# Patient Record
Sex: MALE | Race: BLACK OR AFRICAN AMERICAN | Employment: FULL TIME | URBAN - METROPOLITAN AREA
[De-identification: names, ages, dates, MRNs, and addresses within clinical notes are randomized per-mention and may not be internally consistent; named-entity substitution may affect disease eponyms.]

---

## 2018-01-19 ENCOUNTER — OFFICE VISIT (OUTPATIENT)
Dept: FAMILY MEDICINE CLINIC | Age: 58
End: 2018-01-19

## 2018-01-19 ENCOUNTER — HOSPITAL ENCOUNTER (OUTPATIENT)
Dept: LAB | Age: 58
Discharge: HOME | End: 2018-01-19
Payer: COMMERCIAL

## 2018-01-19 VITALS
SYSTOLIC BLOOD PRESSURE: 138 MMHG | HEART RATE: 88 BPM | BODY MASS INDEX: 25.07 KG/M2 | DIASTOLIC BLOOD PRESSURE: 86 MMHG | WEIGHT: 156 LBS | RESPIRATION RATE: 15 BRPM | TEMPERATURE: 98.9 F | OXYGEN SATURATION: 98 % | HEIGHT: 66 IN

## 2018-01-19 DIAGNOSIS — Z11.59 ENCOUNTER FOR HEPATITIS C SCREENING TEST FOR LOW RISK PATIENT: ICD-10-CM

## 2018-01-19 DIAGNOSIS — Z12.5 PROSTATE CANCER SCREENING: ICD-10-CM

## 2018-01-19 DIAGNOSIS — Z00.00 PHYSICAL EXAM: ICD-10-CM

## 2018-01-19 DIAGNOSIS — M25.551 PAIN OF RIGHT HIP JOINT: ICD-10-CM

## 2018-01-19 DIAGNOSIS — Z00.00 PHYSICAL EXAM: Primary | ICD-10-CM

## 2018-01-19 LAB
ALBUMIN SERPL-MCNC: 3.8 G/DL (ref 3.4–5)
ALBUMIN/GLOB SERPL: 1 {RATIO} (ref 0.8–1.7)
ALP SERPL-CCNC: 71 U/L (ref 45–117)
ALT SERPL-CCNC: 43 U/L (ref 16–61)
ANION GAP SERPL CALC-SCNC: 9 MMOL/L (ref 3–18)
AST SERPL-CCNC: 20 U/L (ref 15–37)
BASOPHILS # BLD: 0.1 K/UL (ref 0–0.06)
BASOPHILS NFR BLD: 1 % (ref 0–2)
BILIRUB SERPL-MCNC: 0.4 MG/DL (ref 0.2–1)
BUN SERPL-MCNC: 18 MG/DL (ref 7–18)
BUN/CREAT SERPL: 21 (ref 12–20)
CALCIUM SERPL-MCNC: 9.2 MG/DL (ref 8.5–10.1)
CHLORIDE SERPL-SCNC: 103 MMOL/L (ref 100–108)
CHOLEST SERPL-MCNC: 141 MG/DL
CO2 SERPL-SCNC: 26 MMOL/L (ref 21–32)
CREAT SERPL-MCNC: 0.87 MG/DL (ref 0.6–1.3)
DIFFERENTIAL METHOD BLD: ABNORMAL
EOSINOPHIL # BLD: 0.1 K/UL (ref 0–0.4)
EOSINOPHIL NFR BLD: 1 % (ref 0–5)
ERYTHROCYTE [DISTWIDTH] IN BLOOD BY AUTOMATED COUNT: 15.2 % (ref 11.6–14.5)
GLOBULIN SER CALC-MCNC: 4 G/DL (ref 2–4)
GLUCOSE SERPL-MCNC: 110 MG/DL (ref 74–99)
HCT VFR BLD AUTO: 48.6 % (ref 36–48)
HDLC SERPL-MCNC: 53 MG/DL (ref 40–60)
HDLC SERPL: 2.7 {RATIO} (ref 0–5)
HGB BLD-MCNC: 15.3 G/DL (ref 13–16)
LDLC SERPL CALC-MCNC: 69.4 MG/DL (ref 0–100)
LIPID PROFILE,FLP: NORMAL
LYMPHOCYTES # BLD: 1.9 K/UL (ref 0.9–3.6)
LYMPHOCYTES NFR BLD: 29 % (ref 21–52)
MCH RBC QN AUTO: 24.1 PG (ref 24–34)
MCHC RBC AUTO-ENTMCNC: 31.5 G/DL (ref 31–37)
MCV RBC AUTO: 76.5 FL (ref 74–97)
MONOCYTES # BLD: 0.8 K/UL (ref 0.05–1.2)
MONOCYTES NFR BLD: 12 % (ref 3–10)
NEUTS SEG # BLD: 3.7 K/UL (ref 1.8–8)
NEUTS SEG NFR BLD: 57 % (ref 40–73)
PLATELET # BLD AUTO: 283 K/UL (ref 135–420)
PMV BLD AUTO: 10.9 FL (ref 9.2–11.8)
POTASSIUM SERPL-SCNC: 4.4 MMOL/L (ref 3.5–5.5)
PROT SERPL-MCNC: 7.8 G/DL (ref 6.4–8.2)
PSA SERPL-MCNC: 0.4 NG/ML (ref 0–4)
RBC # BLD AUTO: 6.35 M/UL (ref 4.7–5.5)
SODIUM SERPL-SCNC: 138 MMOL/L (ref 136–145)
TRIGL SERPL-MCNC: 93 MG/DL (ref ?–150)
VLDLC SERPL CALC-MCNC: 18.6 MG/DL
WBC # BLD AUTO: 6.5 K/UL (ref 4.6–13.2)

## 2018-01-19 PROCEDURE — 85025 COMPLETE CBC W/AUTO DIFF WBC: CPT | Performed by: FAMILY MEDICINE

## 2018-01-19 PROCEDURE — 80061 LIPID PANEL: CPT | Performed by: FAMILY MEDICINE

## 2018-01-19 PROCEDURE — 86803 HEPATITIS C AB TEST: CPT | Performed by: FAMILY MEDICINE

## 2018-01-19 PROCEDURE — 84153 ASSAY OF PSA TOTAL: CPT | Performed by: FAMILY MEDICINE

## 2018-01-19 PROCEDURE — 82306 VITAMIN D 25 HYDROXY: CPT | Performed by: FAMILY MEDICINE

## 2018-01-19 PROCEDURE — 80053 COMPREHEN METABOLIC PANEL: CPT | Performed by: FAMILY MEDICINE

## 2018-01-19 NOTE — PROGRESS NOTES
Gilda Smoker. is a 56 y.o. male  presents for physical.  He has some right sided hip pain intermittently. No chest pain or SOB. No Known Allergies    Patient Active Problem List   Diagnosis Code    Advanced directives, counseling/discussion Z71.89    Elevated blood pressure [id number]     Past Medical History:   Diagnosis Date    Hypertension      Social History     Social History    Marital status:      Spouse name: N/A    Number of children: N/A    Years of education: N/A     Social History Main Topics    Smoking status: Never Smoker    Smokeless tobacco: Never Used    Alcohol use 4.8 oz/week     8 Cans of beer per week      Comment: weekends    Drug use: No    Sexual activity: Yes     Other Topics Concern    Not on file     Social History Narrative     No family history on file. Review of Systems   Constitutional: Negative for chills and fever. Cardiovascular: Positive for palpitations. Negative for chest pain. Gastrointestinal: Negative for constipation, diarrhea, nausea and vomiting. Musculoskeletal: Positive for joint pain (right hip). Neurological: Negative. Vitals:    01/19/18 0919   BP: 138/86   Pulse: 88   Resp: 15   Temp: 98.9 °F (37.2 °C)   SpO2: 98%   Weight: 156 lb (70.8 kg)   Height: 5' 6\" (1.676 m)   PainSc:   0 - No pain       Physical Exam   Constitutional: He is oriented to person, place, and time and well-developed, well-nourished, and in no distress. HENT:   Right Ear: External ear normal.   Left Ear: External ear normal.   Nose: Nose normal.   Mouth/Throat: Oropharynx is clear and moist.   Eyes: Conjunctivae are normal. Pupils are equal, round, and reactive to light. Neck: Normal range of motion. Neck supple. Cardiovascular: Normal rate, regular rhythm and normal heart sounds. Pulmonary/Chest: Effort normal and breath sounds normal.   Abdominal: Soft. Bowel sounds are normal.   Musculoskeletal: Normal range of motion.    Neurological: He is alert and oriented to person, place, and time. Gait normal.   Skin: Skin is warm and dry. Psychiatric: Mood, memory, affect and judgment normal.   Nursing note and vitals reviewed. Assessment/Plan      ICD-10-CM ICD-9-CM    1. Physical exam Z00.00 V70.9 CBC WITH AUTOMATED DIFF      LIPID PANEL      VITAMIN D, 25 HYDROXY      METABOLIC PANEL, COMPREHENSIVE   2. Pain of right hip joint M25.551 719.45    3. Prostate cancer screening Z12.5 V76.44 PSA SCREENING (SCREENING)   4. Encounter for hepatitis C screening test for low risk patient Z11.59 V73.89 HEPATITIS C AB     I have discussed the diagnosis with the patient and the intended plan of care as seen in the above orders. The patient has received an after-visit summary and questions were answered concerning future plans. I have discussed medication, side effects, and warnings with the patient in detail. The patient verbalized understanding and is in agreement with the plan of care. The patient will contact the office with any additional concerns. Follow-up Disposition:  Return in about 6 months (around 7/19/2018).   lab results and schedule of future lab studies reviewed with patient    Jameson Saldana MD

## 2018-01-19 NOTE — PATIENT INSTRUCTIONS

## 2018-01-19 NOTE — MR AVS SNAPSHOT
[facility] [address] [facility] 
[phone] Patient: Giovanna Chung. MRN: [id number] :1960 Visit Information Date & Time Provider Department Dept. Phone Encounter #  
 2018  9:15 AM Homa CaceresPriscila 33 [id number] Follow-up Instructions Return in about 6 months (around 2018). Upcoming Health Maintenance Date Due Hepatitis C Screening 1960 COLONOSCOPY 2021 DTaP/Tdap/Td series (2 - Td) 9/15/2025 Allergies as of 2018  Review Complete On: 2018 By: Homa Caceres MD  
 No Known Allergies Current Immunizations  Never Reviewed Name Date Influenza Vaccine (Quad) PF 2015 Tdap 9/15/2015 Not reviewed this visit You Were Diagnosed With   
  
 Codes Comments Physical exam    -  Primary ICD-10-CM: Z00.00 ICD-9-CM: V70.9 Pain of right hip joint     ICD-10-CM: M25.551 ICD-9-CM: 719.45 Prostate cancer screening     ICD-10-CM: Z12.5 ICD-9-CM: V76.44 Encounter for hepatitis C screening test for low risk patient     ICD-10-CM: Z11.59 
ICD-9-CM: V73.89 Vitals BP Pulse Temp Resp Height(growth percentile) Weight(growth percentile) 138/86 88 98.9 °F (37.2 °C) 15 5' 6\" (1.676 m) 156 lb (70.8 kg) SpO2 BMI Smoking Status 98% 25.18 kg/m2 Never Smoker Vitals History BMI and BSA Data Body Mass Index Body Surface Area  
 25.18 kg/m 2 1.82 m 2 Preferred Pharmacy Pharmacy Name Phone [id number], [address] [place] [address] Your Updated Medication List  
  
   
This list is accurate as of: 18  9:36 AM.  Always use your most recent med list. amLODIPine 5 mg tablet Commonly known as:  Xochitl Matos Take 1 Tab by mouth daily. Follow-up Instructions Return in about 6 months (around 7/19/2018). To-Do List   
 01/19/2018 Lab:  CBC WITH AUTOMATED DIFF   
  
 01/19/2018 Lab:  HEPATITIS C AB   
  
 01/19/2018 Lab:  LIPID PANEL   
  
 01/19/2018 Lab:  METABOLIC PANEL, COMPREHENSIVE   
  
 01/19/2018 Lab:  PSA SCREENING (SCREENING)   
  
 01/19/2018 Lab:  VITAMIN D, 25 HYDROXY Patient Instructions Well Visit, Men 48 to 72: Care Instructions Your Care Instructions Physical exams can help you stay healthy. Your doctor has checked your overall health and may have suggested ways to take good care of yourself. He or she also may have recommended tests. At home, you can help prevent illness with healthy eating, regular exercise, and other steps. Follow-up care is a key part of your treatment and safety. Be sure to make and go to all appointments, and call your doctor if you are having problems. It's also a good idea to know your test results and keep a list of the medicines you take. How can you care for yourself at home? · Reach and stay at a healthy weight. This will lower your risk for many problems, such as obesity, diabetes, heart disease, and high blood pressure. · Get at least 30 minutes of exercise on most days of the week. Walking is a good choice. You also may want to do other activities, such as running, swimming, cycling, or playing tennis or team sports. · Do not smoke. Smoking can make health problems worse. If you need help quitting, talk to your doctor about stop-smoking programs and medicines. These can increase your chances of quitting for good. · Protect your skin from too much sun. When you're outdoors from 10 a.m. to 4 p.m., stay in the shade or cover up with clothing and a hat with a wide brim. Wear sunglasses that block UV rays. Even when it's cloudy, put broad-spectrum sunscreen (SPF 30 or higher) on any exposed skin.  
· See a dentist one or two times a year for checkups and to have your teeth cleaned. · Wear a seat belt in the car. · Limit alcohol to 2 drinks a day. Too much alcohol can cause health problems. Follow your doctor's advice about when to have certain tests. These tests can spot problems early. · Cholesterol. Your doctor will tell you how often to have this done based on your overall health and other things that can increase your risk for heart attack and stroke. · Blood pressure. Have your blood pressure checked during a routine doctor visit. Your doctor will tell you how often to check your blood pressure based on your age, your blood pressure results, and other factors. · Prostate exam. Talk to your doctor about whether you should have a blood test (called a PSA test) for prostate cancer. Experts disagree on whether men should have this test. Some experts recommend that you discuss the benefits and risks of the test with your doctor. · Diabetes. Ask your doctor whether you should have tests for diabetes. · Vision. Some experts recommend that you have yearly exams for glaucoma and other age-related eye problems starting at age 48. · Hearing. Tell your doctor if you notice any change in your hearing. You can have tests to find out how well you hear. · Colon cancer. You should begin tests for colon cancer at age 48. You may have one of several tests. Your doctor will tell you how often to have tests based on your age and risk. Risks include whether you already had a precancerous polyp removed from your colon or whether your parent, brother, sister, or child has had colon cancer. · Heart attack and stroke risk. At least every 4 to 6 years, you should have your risk for heart attack and stroke assessed. Your doctor uses factors such as your age, blood pressure, cholesterol, and whether you smoke or have diabetes to show what your risk for a heart attack or stroke is over the next 10 years. · Abdominal aortic aneurysm.  Ask your doctor whether you should have a test to check for an aneurysm. You may need a test if you ever smoked or if your parent, brother, sister, or child has had an aneurysm. When should you call for help? Watch closely for changes in your health, and be sure to contact your doctor if you have any problems or symptoms that concern you. Where can you learn more? Go to http://ezequiel-bao.info/. Enter Y211 in the search box to learn more about \"Well Visit, Men 48 to 72: Care Instructions. \" Current as of: May 12, 2017 Content Version: 11.4 © 1468-9322 Huaat. Care instructions adapted under license by Leho (which disclaims liability or warranty for this information). If you have questions about a medical condition or this instruction, always ask your healthcare professional. [address] any warranty or liability for your use of this information. Introducing [facility] & HEALTH SERVICES! Arpan Morillo introduces [employer] patient portal. Now you can access parts of your medical record, email your doctor's office, and request medication refills online. 1. In your internet browser, go to [email or web address] 2. Click on the First Time User? Click Here link in the Sign In box. You will see the New Member Sign Up page. 3. Enter your [employer] Access Code exactly as it appears below. You will not need to use this code after youve completed the sign-up process. If you do not sign up before the expiration date, you must request a new code. · [employer] Access Code: [email or web address] Expires: 4/19/2018  9:36 AM 
 
4. Enter the last four digits of your Social Security Number (xxxx) and Date of Birth (mm/dd/yyyy) as indicated and click Submit. You will be taken to the next sign-up page. 5. Create a [employer] ID. This will be your [employer] login ID and cannot be changed, so think of one that is secure and easy to remember. 6. Create a [employer]ron[employer] password. You can change your password at any time. 7. Enter your Password Reset Question and Answer. This can be used at a later time if you forget your password. 8. Enter your e-mail address. You will receive e-mail notification when new information is available in [address] 9. Click Sign Up. You can now view and download portions of your medical record. 10. Click the Download Summary menu link to download a portable copy of your medical information. If you have questions, please visit the Frequently Asked Questions section of the [employer] website. Remember, [employer] is NOT to be used for urgent needs. For medical emergencies, dial 911. Now available from your iPhone and Android! Please provide this summary of care documentation to your next provider. Your primary care clinician is listed as [address] [facility]. If you have any questions after today's visit, please call [phone].

## 2018-01-19 NOTE — PROGRESS NOTES
Pt in office of CPE. He is not taking his BP meds.  States that 'sometimes he gets a pain in his right hip that runs down his leg'

## 2018-01-20 LAB — 25(OH)D3 SERPL-MCNC: 20.9 NG/ML (ref 30–100)

## 2018-01-22 LAB
HCV AB SER IA-ACNC: 0.05 INDEX
HCV AB SERPL QL IA: NEGATIVE
HCV COMMENT,HCGAC: NORMAL

## 2018-02-14 ENCOUNTER — TELEPHONE (OUTPATIENT)
Dept: FAMILY MEDICINE CLINIC | Age: 58
End: 2018-02-14

## 2018-02-19 ENCOUNTER — TELEPHONE (OUTPATIENT)
Dept: FAMILY MEDICINE CLINIC | Age: 58
End: 2018-02-19

## 2018-02-19 ENCOUNTER — OFFICE VISIT (OUTPATIENT)
Dept: FAMILY MEDICINE CLINIC | Age: 58
End: 2018-02-19

## 2018-02-19 VITALS
SYSTOLIC BLOOD PRESSURE: 144 MMHG | TEMPERATURE: 98.8 F | RESPIRATION RATE: 16 BRPM | WEIGHT: 156 LBS | OXYGEN SATURATION: 98 % | HEIGHT: 66 IN | DIASTOLIC BLOOD PRESSURE: 86 MMHG | HEART RATE: 67 BPM | BODY MASS INDEX: 25.07 KG/M2

## 2018-02-19 DIAGNOSIS — I10 ESSENTIAL HYPERTENSION: ICD-10-CM

## 2018-02-19 DIAGNOSIS — E55.9 VITAMIN D DEFICIENCY: Primary | ICD-10-CM

## 2018-02-19 RX ORDER — ERGOCALCIFEROL 1.25 MG/1
50000 CAPSULE ORAL
Qty: 12 CAP | Refills: 1 | Status: SHIPPED | OUTPATIENT
Start: 2018-02-19 | End: 2019-04-10 | Stop reason: SDUPTHER

## 2018-02-19 RX ORDER — AMLODIPINE BESYLATE 5 MG/1
5 TABLET ORAL DAILY
Qty: 30 TAB | Refills: 5 | Status: SHIPPED | OUTPATIENT
Start: 2018-02-19 | End: 2022-08-04 | Stop reason: SDUPTHER

## 2018-02-19 NOTE — PATIENT INSTRUCTIONS
Learning About Vitamin D  Why is it important to get enough vitamin D? Your body needs vitamin D to absorb calcium. Calcium keeps your bones and muscles, including your heart, healthy and strong. If your muscles don't get enough calcium, they can cramp, hurt, or feel weak. You may have long-term (chronic) muscle aches and pains. If you don't get enough vitamin D throughout life, you have an increased chance of having thin and brittle bones (osteoporosis) in your later years. Children who don't get enough vitamin D may not grow as much as others their age. They also have a chance of getting a rare disease called rickets. It causes weak bones. Vitamin D and calcium are added to many foods. And your body uses sunshine to make its own vitamin D. How much vitamin D do you need? The [place] of Medicine recommends that people ages 3 through 79 get 600 IU (international units) every day. Adults 71 and older need 800 IU every day. Blood tests for vitamin D can check your vitamin D level. But there is no standard normal range used by all laboratories. The [place] of Medicine recommends a blood level of 20 ng/mL of vitamin D for healthy bones. And most people in the [place] and [facility] ([facility]) meet this goal.  How can you get more vitamin D? Foods that contain vitamin D include:  · [place], tuna, and mackerel. These are some of the best foods to eat when you need to get more vitamin D.  · Cheese, egg yolks, and beef liver. These foods have vitamin D in small amounts. · Milk, soy drinks, orange juice, yogurt, margarine, and some kinds of cereal have vitamin D added to them. Some people don't make vitamin D as well as others. They may have to take extra care in getting enough vitamin D. Things that reduce how much vitamin D your body makes include:  · Dark skin, such as many  Americans have. · Age, especially if you are older than 72. · Digestive problems, such as Crohn's or celiac disease.   · Liver and kidney disease. Some people who do not get enough vitamin D may need supplements. Are there any risks from taking vitamin D?  · Too much vitamin D:  ¨ Can damage your kidneys. ¨ Can cause nausea and vomiting, constipation, and weakness. ¨ Raises the amount of calcium in your blood. If this happens, you can get confused or have an irregular heart rhythm. · Vitamin D may interact with other medicines. Tell your doctor about all of the medicines you take, including over-the-counter drugs, herbs, and pills. Tell your doctor about all of your current medical problems. Where can you learn more? Go to http://ezequielThe Kive Companybao.info/. Enter [id number] in the search box to learn more about \"Learning About Vitamin D.\"  Current as of: May 12, 2017  Content Version: 11.4  © 3439-5038 Healthwise, Incorporated. Care instructions adapted under license by Pixelpipe (which disclaims liability or warranty for this information). If you have questions about a medical condition or this instruction, always ask your healthcare professional. [address] any warranty or liability for your use of this information.

## 2018-02-19 NOTE — TELEPHONE ENCOUNTER
Pt states that Dr. Prem Benoit was going to send him something for his cold, but nothing was sent in. Please advise!

## 2018-02-19 NOTE — PROGRESS NOTES
Chief Complaint   Patient presents with    Mass     on legs.  Cough    Nasal Congestion     chest and nasal congestion. \"Can't cough it up\"    Vitamin D Deficiency     follow up on labs. Low Vit D.      1. Have you been to the ER, urgent care clinic since your last visit? Hospitalized since your last visit? No    2. Have you seen or consulted any other health care providers outside of the [address] [place] [address] since your last visit? Include any pap smears or colon screening.  No

## 2018-02-19 NOTE — MR AVS SNAPSHOT
[facility] [address] [place] [address] 
[phone] Patient: Laverne Callahan. MRN: [id number] :1960 Visit Information Date & Time Provider Department Dept. Phone Encounter #  
 2018  3:30 PM Jian Malik MD [facility] [phone] [id number] Follow-up Instructions Return in about 3 months (around 2018). Upcoming Health Maintenance Date Due COLONOSCOPY 2021 DTaP/Tdap/Td series (2 - Td) 9/15/2025 Allergies as of 2018  Review Complete On: 2018 By: Jian Malik MD  
 No Known Allergies Current Immunizations  Never Reviewed Name Date Influenza Vaccine (Quad) PF 2015 Tdap 9/15/2015 Not reviewed this visit You Were Diagnosed With   
  
 Codes Comments Vitamin D deficiency    -  Primary ICD-10-CM: E55.9 ICD-9-CM: 268.9 Essential hypertension     ICD-10-CM: I10 
ICD-9-CM: 401.9 Vitals BP Pulse Temp Resp Height(growth percentile) Weight(growth percentile) 144/86 67 98.8 °F (37.1 °C) 16 5' 6\" (1.676 m) 156 lb (70.8 kg) SpO2 BMI Smoking Status 98% 25.18 kg/m2 Never Smoker Vitals History BMI and BSA Data Body Mass Index Body Surface Area  
 25.18 kg/m 2 1.82 m 2 Preferred Pharmacy [facility] Phone [facility] [address] Your Updated Medication List  
  
   
This list is accurate as of: 18  3:52 PM.  Always use your most recent med list. amLODIPine 5 mg tablet Commonly known as:  Brenda Slate Take 1 Tab by mouth daily. ergocalciferol 50,000 unit capsule Commonly known as:  VITAMIN D2 Take 1 Cap by mouth every seven (7) days. Prescriptions Sent to Pharmacy Refills  
 ergocalciferol (VITAMIN D2) 50,000 unit capsule 1 Sig: Take 1 Cap by mouth every seven (7) days. Class: Normal  
 Pharmacy: [address] [place] [address], Jong Contreras Ph #: [phone] Route: Oral  
 amLODIPine (NORVASC) 5 mg tablet 5 Sig: Take 1 Tab by mouth daily. Class: Normal  
 Pharmacy: [address] [place], [place] [address]Jong Ph #: [phone] Route: Oral  
  
Follow-up Instructions Return in about 3 months (around 5/19/2018). Patient Instructions Learning About Vitamin D Why is it important to get enough vitamin D? Your body needs vitamin D to absorb calcium. Calcium keeps your bones and muscles, including your heart, healthy and strong. If your muscles don't get enough calcium, they can cramp, hurt, or feel weak. You may have long-term (chronic) muscle aches and pains. If you don't get enough vitamin D throughout life, you have an increased chance of having thin and brittle bones (osteoporosis) in your later years. Children who don't get enough vitamin D may not grow as much as others their age. They also have a chance of getting a rare disease called rickets. It causes weak bones. Vitamin D and calcium are added to many foods. And your body uses sunshine to make its own vitamin D. How much vitamin D do you need? The [place] of Medicine recommends that people ages 3 through 79 get 600 IU (international units) every day. Adults 71 and older need 800 IU every day. Blood tests for vitamin D can check your vitamin D level. But there is no standard normal range used by all laboratories. The [place] of Medicine recommends a blood level of 20 ng/mL of vitamin D for healthy bones. And most people in the [place] and [facility] ([facility]) meet this goal. 
How can you get more vitamin D? Foods that contain vitamin D include: 
· [place], tuna, and mackerel.  These are some of the best foods to eat when you need to get more vitamin D. 
 · Cheese, egg yolks, and beef liver. These foods have vitamin D in small amounts. · Milk, soy drinks, orange juice, yogurt, margarine, and some kinds of cereal have vitamin D added to them. Some people don't make vitamin D as well as others. They may have to take extra care in getting enough vitamin D. Things that reduce how much vitamin D your body makes include: · Dark skin, such as many  Americans have. · Age, especially if you are older than 72. · Digestive problems, such as Crohn's or celiac disease. · Liver and kidney disease. Some people who do not get enough vitamin D may need supplements. Are there any risks from taking vitamin D? 
· Too much vitamin D: 
¨ Can damage your kidneys. ¨ Can cause nausea and vomiting, constipation, and weakness. ¨ Raises the amount of calcium in your blood. If this happens, you can get confused or have an irregular heart rhythm. · Vitamin D may interact with other medicines. Tell your doctor about all of the medicines you take, including over-the-counter drugs, herbs, and pills. Tell your doctor about all of your current medical problems. Where can you learn more? Go to http://ezequiel-bao.info/. Enter [id number] in the search box to learn more about \"Learning About Vitamin D.\" 
Current as of: May 12, 2017 Content Version: 11.4 © 9598-7127 Healthwise, Incorporated. Care instructions adapted under license by marker.to (which disclaims liability or warranty for this information). If you have questions about a medical condition or this instruction, always ask your healthcare professional. [place] [address] any warranty or liability for your use of this information. Introducing [facility] & HEALTH SERVICES! [facility] introduces [employer] patient portal. Now you can access parts of your medical record, email your doctor's office, and request medication refills online.    
 
1. In your internet browser, go to [email or web address] 2. Click on the First Time User? Click Here link in the Sign In box. You will see the New Member Sign Up page. 3. Enter your [employer] Access Code exactly as it appears below. You will not need to use this code after youve completed the sign-up process. If you do not sign up before the expiration date, you must request a new code. · [employer] Access Code: [email or web address] Expires: 4/19/2018  9:36 AM 
 
4. Enter the last four digits of your Social Security Number (xxxx) and Date of Birth (mm/dd/yyyy) as indicated and click Submit. You will be taken to the next sign-up page. 5. Create a Zimridet ID. This will be your [employer] login ID and cannot be changed, so think of one that is secure and easy to remember. 6. Create a [employer] password. You can change your password at any time. 7. Enter your Password Reset Question and Answer. This can be used at a later time if you forget your password. 8. Enter your e-mail address. You will receive e-mail notification when new information is available in [address] 9. Click Sign Up. You can now view and download portions of your medical record. 10. Click the Download Summary menu link to download a portable copy of your medical information. If you have questions, please visit the Frequently Asked Questions section of the [employer] website. Remember, [employer] is NOT to be used for urgent needs. For medical emergencies, dial 911. Now available from your iPhone and Android! Please provide this summary of care documentation to your next provider. Your primary care clinician is listed as [address]. If you have any questions after today's visit, please call [phone].

## 2018-02-19 NOTE — LETTER
3/7/2018 10:57 AM 
 
Mr. Sonya Pang. [address] [facility] [address]  [facility], We have been unable to reach you at the number on file. At your convenience, please give our office a call. Our number is [phone]. Sincerely, Emely Tinajero MD

## 2018-02-19 NOTE — PROGRESS NOTES
Kenny Traore. is a 56 y.o. male  presents for follow up and refill. He has shiny spot on leg. Non tender. No Known Allergies  Outpatient Prescriptions Marked as Taking for the 2/19/18 encounter (Office Visit) with Abigail Camp MD   Medication Sig Dispense Refill    amLODIPine (NORVASC) 5 mg tablet Take 1 Tab by mouth daily. 30 Tab 2     Patient Active Problem List   Diagnosis Code    Advanced directives, counseling/discussion Z71.89    Elevated blood pressure [id number]    Pain of right hip joint M25.551     Past Medical History:   Diagnosis Date    Hypertension      Social History     Social History    Marital status:      Spouse name: N/A    Number of children: N/A    Years of education: N/A     Social History Main Topics    Smoking status: Never Smoker    Smokeless tobacco: Never Used    Alcohol use 4.8 oz/week     8 Cans of beer per week      Comment: weekends    Drug use: No    Sexual activity: Yes     Other Topics Concern    None     Social History Narrative     No family history on file. Review of Systems   Constitutional: Negative for chills and fever. Cardiovascular: Negative for chest pain and palpitations. Gastrointestinal: Negative for constipation, diarrhea, nausea and vomiting. Vitals:    02/19/18 1540   BP: 144/86   Pulse: 67   Resp: 16   Temp: 98.8 °F (37.1 °C)   SpO2: 98%   Weight: 156 lb (70.8 kg)   Height: 5' 6\" (1.676 m)   PainSc:   0 - No pain       Physical Exam   Constitutional: He is oriented to person, place, and time and well-developed, well-nourished, and in no distress. HENT:   Nose: Nose normal.   Mouth/Throat: Oropharynx is clear and moist.   Eyes: Conjunctivae are normal. Pupils are equal, round, and reactive to light. Neck: Normal range of motion. Neck supple. Cardiovascular: Normal rate, regular rhythm and normal heart sounds.     Pulmonary/Chest: Effort normal and breath sounds normal.   Neurological: He is oriented to person, place, and time. Gait normal.   Skin: Skin is warm and dry. Psychiatric: Mood, memory, affect and judgment normal.   Nursing note and vitals reviewed. Assessment/Plan      ICD-10-CM ICD-9-CM    1. Vitamin D deficiency E55.9 268.9 ergocalciferol (VITAMIN D2) 50,000 unit capsule   2. Essential hypertension I10 401.9 amLODIPine (NORVASC) 5 mg tablet     I have discussed the diagnosis with the patient and the intended plan of care as seen in the above orders. The patient has received an after-visit summary and questions were answered concerning future plans. I have discussed medication, side effects, and warnings with the patient in detail. The patient verbalized understanding and is in agreement with the plan of care. The patient will contact the office with any additional concerns. Follow-up Disposition:  Return in about 3 months (around 5/19/2018).   lab results and schedule of future lab studies reviewed with patient    Rosa M More MD

## 2019-02-11 ENCOUNTER — HOSPITAL ENCOUNTER (OUTPATIENT)
Dept: LAB | Age: 59
Discharge: HOME | End: 2019-02-11
Payer: COMMERCIAL

## 2019-02-11 ENCOUNTER — OFFICE VISIT (OUTPATIENT)
Dept: FAMILY MEDICINE CLINIC | Age: 59
End: 2019-02-11

## 2019-02-11 VITALS
DIASTOLIC BLOOD PRESSURE: 80 MMHG | HEIGHT: 66 IN | WEIGHT: 159.6 LBS | HEART RATE: 86 BPM | BODY MASS INDEX: 25.65 KG/M2 | SYSTOLIC BLOOD PRESSURE: 150 MMHG | RESPIRATION RATE: 14 BRPM | TEMPERATURE: 98.7 F | OXYGEN SATURATION: 94 %

## 2019-02-11 DIAGNOSIS — N20.0 KIDNEY STONE: Primary | ICD-10-CM

## 2019-02-11 PROCEDURE — 82360 CALCULUS ASSAY QUANT: CPT

## 2019-02-11 RX ORDER — TAMSULOSIN HYDROCHLORIDE 0.4 MG/1
CAPSULE ORAL
Refills: 0 | COMMUNITY
Start: 2019-01-26 | End: 2021-05-20

## 2019-02-11 RX ORDER — PROMETHAZINE HYDROCHLORIDE 25 MG/1
TABLET ORAL
Refills: 0 | COMMUNITY
Start: 2019-01-26 | End: 2021-05-20

## 2019-02-11 RX ORDER — OXYCODONE AND ACETAMINOPHEN 5; 325 MG/1; MG/1
TABLET ORAL
Refills: 0 | COMMUNITY
Start: 2019-01-26 | End: 2021-05-20

## 2019-02-11 NOTE — PROGRESS NOTES
Rikki Wilhelm is a 66 y.o. male  presents for follow up with kidney stone. No back pain fever or chills now. No Known Allergies  Outpatient Medications Marked as Taking for the 2/11/19 encounter (Office Visit) with Saloni Barron MD   Medication Sig Dispense Refill    oxyCODONE-acetaminophen (PERCOCET) 5-325 mg per tablet TAKE 1 TABLET BY MOUTH EVERY 6 HOURS AS NEEDED FOR MODERATE PAIN  0    tamsulosin (FLOMAX) 0.4 mg capsule TAKE 1 CAPSULE BY MOUTH AT BEDTIME FOR 7 DAYS  0     Patient Active Problem List   Diagnosis Code    Advanced directives, counseling/discussion Z71.89    Elevated blood pressure [id number]    Pain of right hip joint M25.551     Past Medical History:   Diagnosis Date    Hypertension      Social History     Socioeconomic History    Marital status:      Spouse name: Not on file    Number of children: Not on file    Years of education: Not on file    Highest education level: Not on file   Tobacco Use    Smoking status: Never Smoker    Smokeless tobacco: Never Used   Substance and Sexual Activity    Alcohol use: Yes     Alcohol/week: 4.8 oz     Types: 8 Cans of beer per week     Comment: weekends    Drug use: No    Sexual activity: Yes     History reviewed. No pertinent family history. Review of Systems   Constitutional: Negative for chills, fever, malaise/fatigue and weight loss. Eyes: Negative for blurred vision. Respiratory: Negative for shortness of breath and wheezing. Cardiovascular: Negative for chest pain. Gastrointestinal: Negative for nausea and vomiting. Genitourinary: Negative for dysuria, flank pain, frequency, hematuria and urgency. Musculoskeletal: Negative for back pain and myalgias. Skin: Negative for rash. Neurological: Negative for weakness.        Vitals:    02/11/19 0818   BP: 150/80   Pulse: 86   Resp: 14   Temp: 98.7 °F (37.1 °C)   TempSrc: Oral   SpO2: 94%   Weight: 159 lb 9.6 oz (72.4 kg)   Height: 5' 6\" (1.676 m)   PainSc:   0 - No pain       Physical Exam   Constitutional: He is oriented to person, place, and time and well-developed, well-nourished, and in no distress. Neck: Normal range of motion. Neck supple. No thyromegaly present. Cardiovascular: Normal rate, regular rhythm and normal heart sounds. Pulmonary/Chest: Effort normal and breath sounds normal.   Musculoskeletal: Normal range of motion. Neurological: He is alert and oriented to person, place, and time. Skin: Skin is warm and dry. Psychiatric: Mood, memory, affect and judgment normal.   Nursing note and vitals reviewed. Assessment/Plan      ICD-10-CM ICD-9-CM    1. Kidney stone N20.0 592.0 STONE ANALYSIS, URINARY     I have discussed the diagnosis with the patient and the intended plan of care as seen in the above orders. The patient has received an after-visit summary and questions were answered concerning future plans. I have discussed medication, side effects, and warnings with the patient in detail. The patient verbalized understanding and is in agreement with the plan of care. The patient will contact the office with any additional concerns. Keep appt with urology    Follow-up Disposition:  Return in about 3 weeks (around 3/4/2019).   lab results and schedule of future lab studies reviewed with patient    Park Castro MD

## 2019-02-11 NOTE — PROGRESS NOTES
Chief Complaint   Patient presents with    Kidney Stone     Seen at [facility] on 1/26/19 for kidney stones, was referred to Urology which he did see on 1/28/19. 1. Have you been to the ER, urgent care clinic since your last visit? Hospitalized since your last visit? Yes When: Feb 2, 2019 Where: [facility] Reason for visit: Kidney Stone  2. Have you seen or consulted any other health care providers outside of the [address] [place] [address] since your last visit? Include any pap smears or colon screening. Yes When: Jan 28, 2019 Where: [facility] Reason for visit: kidney stone    Medication reconciliation has been completed with patient. Care team discussed/updated as well as pharmacy. Health Maintenance reviewed - Flu vaccine not available, will discuss need for Shingrix with provider.

## 2019-02-14 LAB
COLOR STONE: NORMAL
COMMENT, 519994: NORMAL
COMPOSITION, 120440: NORMAL
DISCLAIMER, STO32L: NORMAL
NIDUS STONE QL: NORMAL
SIZE STONE: NORMAL MM
URATE MFR STONE: 100 %
WT STONE: 53.7 MG

## 2019-02-20 ENCOUNTER — TELEPHONE (OUTPATIENT)
Dept: FAMILY MEDICINE CLINIC | Age: 59
End: 2019-02-20

## 2019-02-20 NOTE — TELEPHONE ENCOUNTER
Pt came into the office. He had questions about his kidney stone. He knows it's a 'gout' stone, but he wants to know how it happened and how to avoid it. I offered pt appt to see dr Esperanza Francis to discuss this. He declined. He asked that I send a message.

## 2019-02-20 NOTE — PROGRESS NOTES
Pt given results. Pt advised to call office he has any issues or concerns or new/worsening sxs. Pt expressed clear understanding and had no further questions.

## 2019-02-26 NOTE — TELEPHONE ENCOUNTER
Emily Anderson MD sent to Gentry Alonso LPN   Caller: Unspecified (6 days ago,  4:33 PM)             Please send him a low purine diet ie gout diet to prevent stones. Pt will  info from . Left message for patient at home number requesting return call.

## 2019-04-04 ENCOUNTER — OFFICE VISIT (OUTPATIENT)
Dept: FAMILY MEDICINE CLINIC | Age: 59
End: 2019-04-04

## 2019-04-04 ENCOUNTER — TELEPHONE (OUTPATIENT)
Dept: FAMILY MEDICINE CLINIC | Age: 59
End: 2019-04-04

## 2019-04-04 ENCOUNTER — HOSPITAL ENCOUNTER (OUTPATIENT)
Dept: LAB | Age: 59
Discharge: HOME | End: 2019-04-04
Payer: COMMERCIAL

## 2019-04-04 VITALS
DIASTOLIC BLOOD PRESSURE: 80 MMHG | SYSTOLIC BLOOD PRESSURE: 132 MMHG | RESPIRATION RATE: 12 BRPM | HEART RATE: 86 BPM | OXYGEN SATURATION: 96 % | HEIGHT: 66 IN | TEMPERATURE: 97.9 F | WEIGHT: 162.4 LBS | BODY MASS INDEX: 26.1 KG/M2

## 2019-04-04 DIAGNOSIS — M25.511 CHRONIC RIGHT SHOULDER PAIN: Primary | ICD-10-CM

## 2019-04-04 DIAGNOSIS — R10.9 FLANK PAIN: ICD-10-CM

## 2019-04-04 DIAGNOSIS — E55.9 VITAMIN D DEFICIENCY: ICD-10-CM

## 2019-04-04 DIAGNOSIS — G89.29 CHRONIC RIGHT SHOULDER PAIN: Primary | ICD-10-CM

## 2019-04-04 LAB — URATE SERPL-MCNC: 5.9 MG/DL (ref 2.6–7.2)

## 2019-04-04 PROCEDURE — 82306 VITAMIN D 25 HYDROXY: CPT

## 2019-04-04 PROCEDURE — 84550 ASSAY OF BLOOD/URIC ACID: CPT

## 2019-04-04 NOTE — PATIENT INSTRUCTIONS
Shoulder Pain: Care Instructions  Your Care Instructions    You can hurt your shoulder by using it too much during an activity, such as fishing or baseball. It can also happen as part of the everyday wear and tear of getting older. Shoulder injuries can be slow to heal, but your shoulder should get better with time. Your doctor may recommend a sling to rest your shoulder. If you have injured your shoulder, you may need testing and treatment. Follow-up care is a key part of your treatment and safety. Be sure to make and go to all appointments, and call your doctor if you are having problems. It's also a good idea to know your test results and keep a list of the medicines you take. How can you care for yourself at home? · Take pain medicines exactly as directed. ? If the doctor gave you a prescription medicine for pain, take it as prescribed. ? If you are not taking a prescription pain medicine, ask your doctor if you can take an over-the-counter medicine. ? Do not take two or more pain medicines at the same time unless the doctor told you to. Many pain medicines contain acetaminophen, which is Tylenol. Too much acetaminophen (Tylenol) can be harmful. · If your doctor recommends that you wear a sling, use it as directed. Do not take it off before your doctor tells you to. · Put ice or a cold pack on the sore area for 10 to 20 minutes at a time. Put a thin cloth between the ice and your skin. · If there is no swelling, you can put moist heat, a heating pad, or a warm cloth on your shoulder. Some doctors suggest alternating between hot and cold. · Rest your shoulder for a few days. If your doctor recommends it, you can then begin gentle exercise of the shoulder, but do not lift anything heavy. When should you call for help? Call 911 anytime you think you may need emergency care. For example, call if:    · You have chest pain or pressure. This may occur with:  ? Sweating. ?  Shortness of breath. ? Nausea or vomiting. ? Pain that spreads from the chest to the neck, jaw, or one or both shoulders or arms. ? Dizziness or lightheadedness. ? A fast or uneven pulse. After calling 911, chew 1 adult-strength aspirin. Wait for an ambulance. Do not try to drive yourself.     · Your arm or hand is cool or pale or changes color.    Call your doctor now or seek immediate medical care if:    · You have signs of infection, such as:  ? Increased pain, swelling, warmth, or redness in your shoulder. ? Red streaks leading from a place on your shoulder. ? Pus draining from an area of your shoulder. ? Swollen lymph nodes in your neck, armpits, or groin. ? A fever.    Watch closely for changes in your health, and be sure to contact your doctor if:    · You cannot use your shoulder.     · Your shoulder does not get better as expected. Where can you learn more? Go to http://ezequiel-bao.info/. Enter F335 in the search box to learn more about \"Shoulder Pain: Care Instructions. \"  Current as of: September 20, 2018  Content Version: 11.9  © 0958-1210 [employer]. Care instructions adapted under license by [employer] (which disclaims liability or warranty for this information). If you have questions about a medical condition or this instruction, always ask your healthcare professional. [address] any warranty or liability for your use of this information.

## 2019-04-04 NOTE — TELEPHONE ENCOUNTER
Patient was in the office today, he stated Dr. Lexie Rodas had mention sending a RX in for him pain. Patient states he's having lt side pain. Please call patient when or if an order is sent in at [phone].

## 2019-04-04 NOTE — PROGRESS NOTES
Patient states he is here for his annual physical he c/o left flank and back pain x 2 weeks. He states he has h/o kidney stones. He also c/o right shoulder pain says he was playing basketball about 2 weeks ago and has been having pain every since. 1. Have you been to the ER, urgent care clinic since your last visit? Hospitalized since your last visit? No  2. Have you seen or consulted any other health care providers outside of the [address] [place] [address] since your last visit? Include any pap smears or colon screening. No    Medication reconciliation has been completed with patient. Care team discussed/updated as well as pharmacy. Health Maintenance reviewed - Patient declined Shingrix vaccine.

## 2019-04-05 LAB — 25(OH)D3 SERPL-MCNC: 15.6 NG/ML (ref 30–100)

## 2019-04-10 DIAGNOSIS — E55.9 VITAMIN D DEFICIENCY: ICD-10-CM

## 2019-04-10 RX ORDER — ERGOCALCIFEROL 1.25 MG/1
50000 CAPSULE ORAL
Qty: 12 CAP | Refills: 1 | Status: SHIPPED | OUTPATIENT
Start: 2019-04-10 | End: 2021-10-06 | Stop reason: SDUPTHER

## 2019-04-11 RX ORDER — DICLOFENAC SODIUM 50 MG/1
50 TABLET, DELAYED RELEASE ORAL 2 TIMES DAILY
Qty: 40 TAB | Refills: 1 | Status: SHIPPED | OUTPATIENT
Start: 2019-04-11 | End: 2021-06-09

## 2020-03-30 ENCOUNTER — TELEPHONE (OUTPATIENT)
Dept: FAMILY MEDICINE CLINIC | Age: 60
End: 2020-03-30

## 2020-03-30 RX ORDER — CIPROFLOXACIN 500 MG/1
500 TABLET ORAL 2 TIMES DAILY
Qty: 28 TAB | Refills: 0 | Status: SHIPPED | OUTPATIENT
Start: 2020-03-30 | End: 2020-04-13

## 2020-03-30 NOTE — TELEPHONE ENCOUNTER
Called patient back had him verify his  he says his symptoms began on yesterday with urinary frequency, mild low back pain, and urinary urges. He says this AM when he went to urinate he noticed blood in the toilet, he continues to have frequency, and urges, denies any back pain today, denies any penile discharge, no fever, no painful/burning with urination no hesitancy. Please advise.

## 2020-03-30 NOTE — TELEPHONE ENCOUNTER
Patient has blood in urine and frequent urination. Please review and advise. States he is unable to do a virtual visit.

## 2020-03-30 NOTE — TELEPHONE ENCOUNTER
Crystal aPtterson MD  You 10 minutes ago (1:59 PM)      I will send in cipro 500 bid for 14 days. Routing comment      Called patient had him verify his  he has been told Dr. Lexie Rodas has sent in cipro 50 mg tablet to his pharmacy which he will take twice a day for 14 days. Patient has expressed understanding. He has been told if he does not better, or develop any new symptoms or start to get worse to call the office back.

## 2020-04-15 ENCOUNTER — VIRTUAL VISIT (OUTPATIENT)
Dept: FAMILY MEDICINE CLINIC | Age: 60
End: 2020-04-15

## 2020-04-15 DIAGNOSIS — L23.89 ALLERGIC CONTACT DERMATITIS DUE TO OTHER AGENTS: Primary | ICD-10-CM

## 2020-04-15 RX ORDER — PREDNISONE 10 MG/1
TABLET ORAL
Qty: 21 TAB | Refills: 0 | Status: SHIPPED | OUTPATIENT
Start: 2020-04-15 | End: 2021-05-20

## 2020-04-15 RX ORDER — TRIAMCINOLONE ACETONIDE 1 MG/G
CREAM TOPICAL 2 TIMES DAILY
Qty: 45 G | Refills: 1 | Status: SHIPPED | OUTPATIENT
Start: 2020-04-15 | End: 2021-05-20

## 2021-05-20 ENCOUNTER — HOSPITAL ENCOUNTER (OUTPATIENT)
Dept: LAB | Age: 61
Discharge: HOME | End: 2021-05-20
Payer: COMMERCIAL

## 2021-05-20 ENCOUNTER — OFFICE VISIT (OUTPATIENT)
Dept: FAMILY MEDICINE CLINIC | Age: 61
End: 2021-05-20
Payer: COMMERCIAL

## 2021-05-20 VITALS
TEMPERATURE: 98.6 F | SYSTOLIC BLOOD PRESSURE: 140 MMHG | WEIGHT: 152.6 LBS | BODY MASS INDEX: 24.63 KG/M2 | HEART RATE: 67 BPM | RESPIRATION RATE: 16 BRPM | DIASTOLIC BLOOD PRESSURE: 80 MMHG | OXYGEN SATURATION: 97 %

## 2021-05-20 DIAGNOSIS — M25.512 ACUTE PAIN OF LEFT SHOULDER: ICD-10-CM

## 2021-05-20 DIAGNOSIS — Z12.5 PROSTATE CANCER SCREENING: ICD-10-CM

## 2021-05-20 DIAGNOSIS — E55.9 VITAMIN D DEFICIENCY: ICD-10-CM

## 2021-05-20 DIAGNOSIS — M25.512 ACUTE PAIN OF LEFT SHOULDER: Primary | ICD-10-CM

## 2021-05-20 LAB
25(OH)D3 SERPL-MCNC: 25.9 NG/ML (ref 30–100)
ALBUMIN SERPL-MCNC: 3.6 G/DL (ref 3.4–5)
ALBUMIN/GLOB SERPL: 0.9 {RATIO} (ref 0.8–1.7)
ALP SERPL-CCNC: 81 U/L (ref 45–117)
ALT SERPL-CCNC: 38 U/L (ref 16–61)
ANION GAP SERPL CALC-SCNC: 6 MMOL/L (ref 3–18)
AST SERPL-CCNC: 16 U/L (ref 10–38)
BASOPHILS # BLD: 0.1 K/UL (ref 0–0.1)
BASOPHILS NFR BLD: 1 % (ref 0–2)
BILIRUB SERPL-MCNC: 0.5 MG/DL (ref 0.2–1)
BUN SERPL-MCNC: 17 MG/DL (ref 7–18)
BUN/CREAT SERPL: 22 (ref 12–20)
CALCIUM SERPL-MCNC: 8.7 MG/DL (ref 8.5–10.1)
CHLORIDE SERPL-SCNC: 109 MMOL/L (ref 100–111)
CHOLEST SERPL-MCNC: 152 MG/DL
CO2 SERPL-SCNC: 25 MMOL/L (ref 21–32)
CREAT SERPL-MCNC: 0.76 MG/DL (ref 0.6–1.3)
DIFFERENTIAL METHOD BLD: ABNORMAL
EOSINOPHIL # BLD: 0.1 K/UL (ref 0–0.4)
EOSINOPHIL NFR BLD: 1 % (ref 0–5)
ERYTHROCYTE [DISTWIDTH] IN BLOOD BY AUTOMATED COUNT: 14.8 % (ref 11.6–14.5)
GLOBULIN SER CALC-MCNC: 4 G/DL (ref 2–4)
GLUCOSE SERPL-MCNC: 128 MG/DL (ref 74–99)
HCT VFR BLD AUTO: 48 % (ref 36–48)
HDLC SERPL-MCNC: 60 MG/DL (ref 40–60)
HDLC SERPL: 2.5 {RATIO} (ref 0–5)
HGB BLD-MCNC: 14.5 G/DL (ref 13–16)
LDLC SERPL CALC-MCNC: 63 MG/DL (ref 0–100)
LIPID PROFILE,FLP: NORMAL
LYMPHOCYTES # BLD: 1.6 K/UL (ref 0.9–3.6)
LYMPHOCYTES NFR BLD: 27 % (ref 21–52)
MCH RBC QN AUTO: 23.8 PG (ref 24–34)
MCHC RBC AUTO-ENTMCNC: 30.2 G/DL (ref 31–37)
MCV RBC AUTO: 78.7 FL (ref 74–97)
MONOCYTES # BLD: 0.7 K/UL (ref 0.05–1.2)
MONOCYTES NFR BLD: 12 % (ref 3–10)
NEUTS SEG # BLD: 3.5 K/UL (ref 1.8–8)
NEUTS SEG NFR BLD: 58 % (ref 40–73)
PLATELET # BLD AUTO: 327 K/UL (ref 135–420)
PMV BLD AUTO: 10.4 FL (ref 9.2–11.8)
POTASSIUM SERPL-SCNC: 4.3 MMOL/L (ref 3.5–5.5)
PROT SERPL-MCNC: 7.6 G/DL (ref 6.4–8.2)
PSA SERPL-MCNC: 0.8 NG/ML (ref 0–4)
RBC # BLD AUTO: 6.1 M/UL (ref 4.35–5.65)
SODIUM SERPL-SCNC: 140 MMOL/L (ref 136–145)
TRIGL SERPL-MCNC: 145 MG/DL (ref ?–150)
VLDLC SERPL CALC-MCNC: 29 MG/DL
WBC # BLD AUTO: 6 K/UL (ref 4.6–13.2)

## 2021-05-20 PROCEDURE — 82306 VITAMIN D 25 HYDROXY: CPT

## 2021-05-20 PROCEDURE — 84153 ASSAY OF PSA TOTAL: CPT

## 2021-05-20 PROCEDURE — 80053 COMPREHEN METABOLIC PANEL: CPT

## 2021-05-20 PROCEDURE — 99214 OFFICE O/P EST MOD 30 MIN: CPT | Performed by: FAMILY MEDICINE

## 2021-05-20 PROCEDURE — 80061 LIPID PANEL: CPT

## 2021-05-20 PROCEDURE — 85025 COMPLETE CBC W/AUTO DIFF WBC: CPT

## 2021-05-20 RX ORDER — DICLOFENAC SODIUM 50 MG/1
50 TABLET, DELAYED RELEASE ORAL 2 TIMES DAILY
Qty: 40 TABLET | Refills: 1 | Status: SHIPPED | OUTPATIENT
Start: 2021-05-20

## 2021-05-20 NOTE — PATIENT INSTRUCTIONS
Shoulder Sprain: Care Instructions  Your Care Instructions     A shoulder sprain occurs when you stretch or tear a ligament in your shoulder. Ligaments are tough tissues that connect one bone to another. A sprain can happen during sports, a fall, or projects around the house. Shoulder sprains usually get better with treatment at home. Follow-up care is a key part of your treatment and safety. Be sure to make and go to all appointments, and call your doctor if you are having problems. It's also a good idea to know your test results and keep a list of the medicines you take. How can you care for yourself at home? · Rest and protect your shoulder. Try to stop or reduce any action that causes pain. · If your doctor gave you a sling or immobilizer, wear it as directed. A sling or immobilizer supports your shoulder and may make you more comfortable. · Put ice or a cold pack on your shoulder for 10 to 20 minutes at a time. Try to do this every 1 to 2 hours for the next 3 days (when you are awake) or until the swelling goes down. Put a thin cloth between the ice and your skin. Some doctors suggest alternating between hot and cold. · Be safe with medicines. Read and follow all instructions on the label. ? If the doctor gave you a prescription medicine for pain, take it as prescribed. ? If you are not taking a prescription pain medicine, ask your doctor if you can take an over-the-counter medicine. · For the first day or two after an injury, avoid things that might increase swelling, such as hot showers, hot tubs, or hot packs. · After 2 or 3 days, if your swelling is gone, apply a heating pad set on low or a warm cloth to your shoulder. This helps keep your shoulder flexible. Some doctors suggest that you go back and forth between hot and cold. Put a thin cloth between the heating pad and your skin. · Follow your doctor's or physical therapist's directions for exercises.   · Return to your usual level of activity slowly. When should you call for help? Call your doctor now or seek immediate medical care if:    · Your pain is worse.     · You cannot move your shoulder.     · Your arm is cool or pale or changes color below the shoulder.     · You have tingling, weakness, or numbness in your arm. Watch closely for changes in your health, and be sure to contact your doctor if:    · You do not get better as expected. Where can you learn more? Go to http://www.gray.com/  Enter J941 in the search box to learn more about \"Shoulder Sprain: Care Instructions. \"  Current as of: November 16, 2020               Content Version: 12.8  © 1185-0332 [employer]. Care instructions adapted under license by [employer] (which disclaims liability or warranty for this information). If you have questions about a medical condition or this instruction, always ask your healthcare professional. [address] any warranty or liability for your use of this information.

## 2021-05-20 NOTE — PROGRESS NOTES
Vu Alfonso. is a 64 y.o. male  presents for left shoulder pain. He injured it around house. No fever or chills. He has had sxs for 2 weeks. No Known Allergies    Patient Active Problem List   Diagnosis Code    Advanced directives, counseling/discussion Z71.89    Elevated blood pressure [id number]    Pain of right hip joint M25.551    Chronic right shoulder pain M25.511, G89.29    Flank pain R10.9     Past Medical History:   Diagnosis Date    Hypertension      Social History     Socioeconomic History    Marital status:      Spouse name: Not on file    Number of children: Not on file    Years of education: Not on file    Highest education level: Not on file   Tobacco Use    Smoking status: Never Smoker    Smokeless tobacco: Never Used   Substance and Sexual Activity    Alcohol use: Yes     Alcohol/week: 8.0 standard drinks     Types: 8 Cans of beer per week     Comment: weekends    Drug use: No    Sexual activity: Yes     Social Determinants of Health     Financial Resource Strain:     Difficulty of Paying Living Expenses:    Food Insecurity:     Worried About Running Out of Food in the Last Year:     Ran Out of Food in the Last Year:    Transportation Needs:     Lack of Transportation (Medical):  Lack of Transportation (Non-Medical):    Physical Activity:     Days of Exercise per Week:     Minutes of Exercise per Session:    Stress:     Feeling of Stress :    Social Connections:     Frequency of Communication with Friends and Family:     Frequency of Social Gatherings with Friends and Family:     Attends Judaism Services:     Active Member of Clubs or Organizations:     Attends Club or Organization Meetings:     Marital Status:      History reviewed. No pertinent family history. Review of Systems   Constitutional: Negative for chills, fever, malaise/fatigue and weight loss. Eyes: Negative for blurred vision.    Respiratory: Negative for cough, shortness of breath and wheezing. Cardiovascular: Negative for chest pain. Gastrointestinal: Negative for nausea and vomiting. Musculoskeletal: Positive for joint pain. Negative for myalgias. Skin: Negative for rash. Neurological: Negative for weakness. Vitals:    05/20/21 1300   BP: (!) 140/80   Pulse: 67   Resp: 16   Temp: 98.6 °F (37 °C)   TempSrc: Oral   SpO2: 97%   Weight: 152 lb 9.6 oz (69.2 kg)   PainSc:   9   PainLoc: Shoulder       Physical Exam  Vitals and nursing note reviewed. Neck:      Thyroid: No thyromegaly. Cardiovascular:      Rate and Rhythm: Normal rate and regular rhythm. Pulses: Normal pulses. Heart sounds: Normal heart sounds. Pulmonary:      Effort: Pulmonary effort is normal.      Breath sounds: Normal breath sounds. Musculoskeletal:         General: Normal range of motion. Cervical back: Normal range of motion and neck supple. Skin:     General: Skin is warm and dry. Capillary Refill: Capillary refill takes less than 2 seconds. Neurological:      General: No focal deficit present. Mental Status: He is alert and oriented to person, place, and time. Psychiatric:         Mood and Affect: Mood normal.         Behavior: Behavior normal.         Thought Content: Thought content normal.         Judgment: Judgment normal.         Assessment/Plan      ICD-10-CM ICD-9-CM    1. Acute pain of left shoulder  M25.512 719.41 XR SHOULDER LT AP/LAT MIN 2 V      REFERRAL TO ORTHOPEDICS      diclofenac EC (VOLTAREN) 50 mg EC tablet      CBC WITH AUTOMATED DIFF      LIPID PANEL      METABOLIC PANEL, COMPREHENSIVE   2. Vitamin D deficiency  E55.9 268.9 VITAMIN D, 25 HYDROXY   3. Prostate cancer screening  Z12.5 V76.44 PSA SCREENING (SCREENING)     I have discussed the diagnosis with the patient and the intended plan of care as seen in the above orders. The patient has received an after-visit summary and questions were answered concerning future plans.  I have discussed medication, side effects, and warnings with the patient in detail. The patient verbalized understanding and is in agreement with the plan of care. The patient will contact the office with any additional concerns.       lab results and schedule of future lab studies reviewed with patient    Noni Black MD

## 2021-05-20 NOTE — PROGRESS NOTES
Patient says he injured his shoulder in a fall 2 weeks ago. He landed on his left shoulder he is not able raise his arm at all and his pain is worse at night. 1. Have you been to the ER, urgent care clinic since your last visit? Hospitalized since your last visit? No  2. Have you seen or consulted any other health care providers outside of the [address] [place] [address] since your last visit? Include any pap smears or colon screening. No    Medication reconciliation has been completed with patient. Care team discussed/updated as well as pharmacy.     Health Maintenance Due   Topic Date Due    COVID-19 Vaccine (1) Never done    Shingrix Vaccine Age 50> (1 of 2) Never done    Colorectal Cancer Screening Combo  01/08/2021

## 2021-05-21 ENCOUNTER — HOSPITAL ENCOUNTER (OUTPATIENT)
Dept: GENERAL RADIOLOGY | Age: 61
Discharge: HOME | End: 2021-05-21
Payer: COMMERCIAL

## 2021-05-21 PROCEDURE — 73030 X-RAY EXAM OF SHOULDER: CPT

## 2021-05-27 NOTE — PROGRESS NOTES
Vitamin d is better but still low lipids and PSA are good BS slightly elevated will get Alc next time.

## 2021-05-28 DIAGNOSIS — M25.512 ACUTE PAIN OF LEFT SHOULDER: Primary | ICD-10-CM

## 2021-06-01 NOTE — PROGRESS NOTES
Pt is aware of results. Will place referral to ortho. Pt aware that if he has not heard from JACKIE by the end of the week to give me a call.

## 2021-06-09 ENCOUNTER — OFFICE VISIT (OUTPATIENT)
Dept: FAMILY MEDICINE CLINIC | Age: 61
End: 2021-06-09
Payer: COMMERCIAL

## 2021-06-09 VITALS
TEMPERATURE: 99 F | RESPIRATION RATE: 12 BRPM | HEART RATE: 74 BPM | DIASTOLIC BLOOD PRESSURE: 80 MMHG | SYSTOLIC BLOOD PRESSURE: 136 MMHG | WEIGHT: 157 LBS | OXYGEN SATURATION: 99 % | BODY MASS INDEX: 25.34 KG/M2

## 2021-06-09 DIAGNOSIS — M25.511 ACUTE PAIN OF RIGHT SHOULDER: Primary | ICD-10-CM

## 2021-06-09 DIAGNOSIS — R03.0 ELEVATED BLOOD PRESSURE READING: ICD-10-CM

## 2021-06-09 PROCEDURE — 99213 OFFICE O/P EST LOW 20 MIN: CPT | Performed by: FAMILY MEDICINE

## 2021-06-09 RX ORDER — METHOCARBAMOL 500 MG/1
500 TABLET, FILM COATED ORAL
Qty: 40 TABLET | Refills: 1 | Status: SHIPPED | OUTPATIENT
Start: 2021-06-09 | End: 2021-07-23

## 2021-06-09 NOTE — PROGRESS NOTES
Patient c/o right shoulder pain x 1 week. 1. Have you been to the ER, urgent care clinic since your last visit? Hospitalized since your last visit? No  2. Have you seen or consulted any other health care providers outside of the [address] [place] [address] since your last visit? Include any pap smears or colon screening. No    Medication reconciliation has been completed with patient. Care team discussed/updated as well as pharmacy.     Health Maintenance Due   Topic Date Due    COVID-19 Vaccine (1) Never done    Shingrix Vaccine Age 50> (1 of 2) Never done    Colorectal Cancer Screening Combo  01/08/2021

## 2021-06-09 NOTE — PATIENT INSTRUCTIONS
Shoulder Pain: Care Instructions  Your Care Instructions     You can hurt your shoulder by using it too much during an activity, such as fishing or baseball. It can also happen as part of the everyday wear and tear of getting older. Shoulder injuries can be slow to heal, but your shoulder should get better with time. Your doctor may recommend a sling to rest your shoulder. If you have injured your shoulder, you may need testing and treatment. Follow-up care is a key part of your treatment and safety. Be sure to make and go to all appointments, and call your doctor if you are having problems. It's also a good idea to know your test results and keep a list of the medicines you take. How can you care for yourself at home? · Take pain medicines exactly as directed. ? If the doctor gave you a prescription medicine for pain, take it as prescribed. ? If you are not taking a prescription pain medicine, ask your doctor if you can take an over-the-counter medicine. ? Do not take two or more pain medicines at the same time unless the doctor told you to. Many pain medicines contain acetaminophen, which is Tylenol. Too much acetaminophen (Tylenol) can be harmful. · If your doctor recommends that you wear a sling, use it as directed. Do not take it off before your doctor tells you to. · Put ice or a cold pack on the sore area for 10 to 20 minutes at a time. Put a thin cloth between the ice and your skin. · If there is no swelling, you can put moist heat, a heating pad, or a warm cloth on your shoulder. Some doctors suggest alternating between hot and cold. · Rest your shoulder for a few days. If your doctor recommends it, you can then begin gentle exercise of the shoulder, but do not lift anything heavy. When should you call for help? Call 911 anytime you think you may need emergency care. For example, call if:    · You have chest pain or pressure. This may occur with:  ? Sweating. ?  Shortness of breath. ? Nausea or vomiting. ? Pain that spreads from the chest to the neck, jaw, or one or both shoulders or arms. ? Dizziness or lightheadedness. ? A fast or uneven pulse. After calling 911, chew 1 adult-strength aspirin. Wait for an ambulance. Do not try to drive yourself.     · Your arm or hand is cool or pale or changes color. Call your doctor now or seek immediate medical care if:    · You have signs of infection, such as:  ? Increased pain, swelling, warmth, or redness in your shoulder. ? Red streaks leading from a place on your shoulder. ? Pus draining from an area of your shoulder. ? Swollen lymph nodes in your neck, armpits, or groin. ? A fever. Watch closely for changes in your health, and be sure to contact your doctor if:    · You cannot use your shoulder.     · Your shoulder does not get better as expected. Where can you learn more? Go to http://www.meyer.com/  Enter H996 in the search box to learn more about \"Shoulder Pain: Care Instructions. \"  Current as of: November 16, 2020               Content Version: 12.8  © 0337-7140 [employer]. Care instructions adapted under license by [employer] (which disclaims liability or warranty for this information). If you have questions about a medical condition or this instruction, always ask your healthcare professional. [address] any warranty or liability for your use of this information.

## 2021-06-09 NOTE — PROGRESS NOTES
Mary Crooks is a 64 y.o. male  presents for both shoulder pain. The right is worse than left. No weakness or numbness. No Known Allergies  Outpatient Medications Marked as Taking for the 6/9/21 encounter (Office Visit) with Luz Titus MD   Medication Sig Dispense Refill    diclofenac EC (VOLTAREN) 50 mg EC tablet Take 1 Tablet by mouth two (2) times a day. 40 Tablet 1     Patient Active Problem List   Diagnosis Code    Advanced directives, counseling/discussion Z71.89    Elevated blood pressure [id number]    Pain of right hip joint M25.551    Chronic right shoulder pain M25.511, G89.29    Flank pain R10.9     Past Medical History:   Diagnosis Date    Hypertension      Social History     Socioeconomic History    Marital status:      Spouse name: Not on file    Number of children: Not on file    Years of education: Not on file    Highest education level: Not on file   Tobacco Use    Smoking status: Never Smoker    Smokeless tobacco: Never Used   Substance and Sexual Activity    Alcohol use: Yes     Alcohol/week: 8.0 standard drinks     Types: 8 Cans of beer per week     Comment: weekends    Drug use: No    Sexual activity: Yes     Social Determinants of Health     Financial Resource Strain:     Difficulty of Paying Living Expenses:    Food Insecurity:     Worried About Running Out of Food in the Last Year:     Ran Out of Food in the Last Year:    Transportation Needs:     Lack of Transportation (Medical):      Lack of Transportation (Non-Medical):    Physical Activity:     Days of Exercise per Week:     Minutes of Exercise per Session:    Stress:     Feeling of Stress :    Social Connections:     Frequency of Communication with Friends and Family:     Frequency of Social Gatherings with Friends and Family:     Attends Confucianism Services:     Active Member of Clubs or Organizations:     Attends Club or Organization Meetings:     Marital Status:      No family history on file. Review of Systems   Constitutional: Negative for chills, fever, malaise/fatigue and weight loss. Eyes: Negative for blurred vision. Respiratory: Negative for cough, shortness of breath and wheezing. Cardiovascular: Negative for chest pain. Gastrointestinal: Negative for nausea and vomiting. Musculoskeletal: Positive for joint pain. Negative for myalgias. Skin: Negative for rash. Neurological: Negative for weakness. Psychiatric/Behavioral: Negative. Vitals:    06/09/21 1715   BP: (!) 140/80   Pulse: 74   Resp: 12   Temp: 99 °F (37.2 °C)   TempSrc: Oral   SpO2: 99%   Weight: 157 lb (71.2 kg)   PainSc:  10 - Worst pain ever   PainLoc: Shoulder       Physical Exam  Vitals and nursing note reviewed. Constitutional:       Appearance: Normal appearance. He is normal weight. Neck:      Thyroid: No thyromegaly. Cardiovascular:      Rate and Rhythm: Normal rate and regular rhythm. Heart sounds: Normal heart sounds. Pulmonary:      Effort: Pulmonary effort is normal.      Breath sounds: Normal breath sounds. Musculoskeletal:         General: No tenderness, deformity or signs of injury. Normal range of motion. Cervical back: Normal range of motion and neck supple. Right lower leg: No edema. Left lower leg: No edema. Skin:     General: Skin is warm and dry. Neurological:      General: No focal deficit present. Mental Status: He is alert and oriented to person, place, and time. Psychiatric:         Mood and Affect: Mood normal.         Behavior: Behavior normal.         Thought Content: Thought content normal.         Judgment: Judgment normal.         Assessment/Plan      ICD-10-CM ICD-9-CM    1. Acute pain of right shoulder  M25.511 719.41 methocarbamoL (ROBAXIN) 500 mg tablet   2. Elevated blood pressure reading  R03.0 796.2      I have discussed the diagnosis with the patient and the intended plan of care as seen in the above orders.  The patient has received an after-visit summary and questions were answered concerning future plans. I have discussed medication, side effects, and warnings with the patient in detail. The patient verbalized understanding and is in agreement with the plan of care. The patient will contact the office with any additional concerns.     lab results and schedule of future lab studies reviewed with patient    Chloe De La Rosa MD

## 2021-06-25 ENCOUNTER — OFFICE VISIT (OUTPATIENT)
Dept: ORTHOPEDIC SURGERY | Age: 61
End: 2021-06-25
Payer: COMMERCIAL

## 2021-06-25 VITALS
BODY MASS INDEX: 23.86 KG/M2 | HEART RATE: 95 BPM | OXYGEN SATURATION: 98 % | HEIGHT: 67 IN | RESPIRATION RATE: 15 BRPM | WEIGHT: 152 LBS

## 2021-06-25 DIAGNOSIS — M79.2 RADICULAR PAIN IN RIGHT ARM: ICD-10-CM

## 2021-06-25 DIAGNOSIS — S46.012A TRAUMATIC TEAR OF LEFT ROTATOR CUFF, UNSPECIFIED TEAR EXTENT, INITIAL ENCOUNTER: Primary | ICD-10-CM

## 2021-06-25 DIAGNOSIS — M47.812 CERVICAL ARTHRITIS: ICD-10-CM

## 2021-06-25 DIAGNOSIS — M79.602 LEFT ARM PAIN: ICD-10-CM

## 2021-06-25 PROCEDURE — 99204 OFFICE O/P NEW MOD 45 MIN: CPT | Performed by: ORTHOPAEDIC SURGERY

## 2021-06-25 PROCEDURE — 73030 X-RAY EXAM OF SHOULDER: CPT | Performed by: ORTHOPAEDIC SURGERY

## 2021-06-25 PROCEDURE — 72040 X-RAY EXAM NECK SPINE 2-3 VW: CPT | Performed by: ORTHOPAEDIC SURGERY

## 2021-06-25 NOTE — PROGRESS NOTES
Patient: Chris Lindquist. MRN: [id number]       SSN: [id number]  YOB: 1960        AGE: 64 y.o. SEX: male  Body mass index is 23.81 kg/m². PCP: Ismael Rowan MD  06/25/21    CHIEF COMPLAINT: Right arm pain, left shoulder pain    HPI: Chris Billings is a 64 y.o. male patient who comes to the office today with 2 complaints. His first complaint is left shoulder pain. He says been having left shoulder pain for quite some time but it got worse recently after a fall. Since that time has been having difficulty with raising his left arm. He reports daily pain in the left shoulder that is worse with activity and worse with trying to raise his arm. He also reports right arm pain that radiates from his neck down to his fingers. He reports some numbness and tingling. He denies any neck pain. Past Medical History:   Diagnosis Date    Hypertension        History reviewed. No pertinent family history. Current Outpatient Medications   Medication Sig Dispense Refill    methocarbamoL (ROBAXIN) 500 mg tablet Take 1 Tablet by mouth two (2) times daily as needed for Muscle Spasm(s). 40 Tablet 1    diclofenac EC (VOLTAREN) 50 mg EC tablet Take 1 Tablet by mouth two (2) times a day. 40 Tablet 1    ergocalciferol (VITAMIN D2) 50,000 unit capsule Take 1 Cap by mouth every seven (7) days. (Patient not taking: Reported on 5/20/2021) 12 Cap 1    amLODIPine (NORVASC) 5 mg tablet Take 1 Tab by mouth daily.  (Patient not taking: Reported on 6/9/2021) 30 Tab 5       No Known Allergies    Past Surgical History:   Procedure Laterality Date    [place] COLONOSCOPY W/BIOPSY SINGLE/MULTIPLE  1-8-16    Dr. Pedro Luis Castro       Social History     Socioeconomic History    Marital status:      Spouse name: Not on file    Number of children: Not on file    Years of education: Not on file    Highest education level: Not on file   Occupational History    Not on file   Tobacco Use    Smoking status: Never Smoker    Smokeless tobacco: Never Used   Substance and Sexual Activity    Alcohol use: Yes     Alcohol/week: 8.0 standard drinks     Types: 8 Cans of beer per week     Comment: weekends    Drug use: No    Sexual activity: Yes   Other Topics Concern    Not on file   Social History Narrative    Not on file     Social Determinants of Health     Financial Resource Strain:     Difficulty of Paying Living Expenses:    Food Insecurity:     Worried About Running Out of Food in the Last Year:     [address] in the Last Year:    Transportation Needs:     Lack of Transportation (Medical):  Lack of Transportation (Non-Medical):    Physical Activity:     Days of Exercise per Week:     Minutes of Exercise per Session:    Stress:     Feeling of Stress :    Social Connections:     Frequency of Communication with Friends and Family:     Frequency of Social Gatherings with Friends and Family:     Attends Taoist Services:     Active Member of Clubs or Organizations:     Attends Club or Organization Meetings:     Marital Status:    Intimate Partner Violence:     Fear of Current or Ex-Partner:     Emotionally Abused:     Physically Abused:     Sexually Abused:        REVIEW OF SYSTEMS:      CON: negative for recent weight loss/gain, fever, or chills  EYE: negative for double or blurry vision  ENT: negative for hoarseness  RS:   negative for cough, URI, SOB  CV:  negative for chest pain, palpitations  GI:    negative for blood in stool, nausea/vomiting  :  negative for blood in urine  MS: As per HPI  Other systems reviewed and noted below. PHYSICAL EXAMINATION:  Visit Vitals  Pulse 95   Resp 15   Ht 5' 7\" (1.702 m)   Wt 152 lb (68.9 kg)   SpO2 98%   BMI 23.81 kg/m²     Body mass index is 23.81 kg/m². GENERAL: Alert and oriented x3, in no acute distress, well-developed, well-nourished. HEENT: Normocephalic, atraumatic.     RESP: Non labored breathing with equal chest rise on inspiration. CV: Well perfused extremities. No cyanosis or clubbing noted. ABDOMEN: Soft, non-tender, non-distended. Shoulder Examination     R   L  ROM   FF  Full   Full  ER  Full   Full   IR  Full   Full  Rotator Cuff Pain   Supra  -   +   Infra  -   +   Subscap -   +  Crepitus  -   -  Effusion  -   -  Warmth  -   -   Erythema  -   -  Instability  -   -  AC Joint TTP  -   -  Clavicle   Deformity -   -   TTP  -   -  Proximal Humerus   Deformity -   -   TTP  -   -  Deltoid Strength 5   5  Biceps Strength 5   5  Biceps Deformity -   -  Biceps Groove Pain -   -  Impingement Sign -   +     Cervical Spine Examination  Neck ROM   Flexion    Full   Extension   Full   Lateral Rotation  Full  Spurling's    Neg  Focal Neuro Deficits   None  Sensation C5-T1   Full  Strength C5-T1   5/5  Tenderness C Spine   None  Stepoff C Spine   None  Paraspinal Tenderness  None  Other Findings   None        IMAGING:  X-rays of the C-spine 2 views were taken the office today which show multilevel cervical degeneration and arthritis    X-rays of the left shoulder 4 views were taken in the office today. These show sclerosis of the greater tuberosity but no fractures and I do not appreciate any significant arthrosis of the glenohumeral joint. ASSESSMENT & PLAN  Diagnosis:  left shoulder rotator cuff tear likely acute on chronic, right arm cervical radiculopathy with cervical arthritis    I have recommended MRIs of both the cervical spine as well as the left shoulder today. For the cervical spine and radicular symptoms I will refer him to our spine service. For the left shoulder I would like to see him back after the MRI is completed.       Electronically signed by: Candace Faith MD

## 2021-07-10 ENCOUNTER — HOSPITAL ENCOUNTER (OUTPATIENT)
Age: 61
Discharge: HOME | End: 2021-07-10
Attending: ORTHOPAEDIC SURGERY
Payer: COMMERCIAL

## 2021-07-10 DIAGNOSIS — M79.2 RADICULAR PAIN IN RIGHT ARM: ICD-10-CM

## 2021-07-10 DIAGNOSIS — M47.812 CERVICAL ARTHRITIS: ICD-10-CM

## 2021-07-10 DIAGNOSIS — S46.012A TRAUMATIC TEAR OF LEFT ROTATOR CUFF, UNSPECIFIED TEAR EXTENT, INITIAL ENCOUNTER: ICD-10-CM

## 2021-07-10 PROCEDURE — 72141 MRI NECK SPINE W/O DYE: CPT

## 2021-07-10 PROCEDURE — 73221 MRI JOINT UPR EXTREM W/O DYE: CPT

## 2021-07-14 ENCOUNTER — TELEPHONE (OUTPATIENT)
Dept: FAMILY MEDICINE CLINIC | Age: 61
End: 2021-07-14

## 2021-07-14 NOTE — TELEPHONE ENCOUNTER
Patient is calling for MRI results. I let him know that this was ordered by Dr. Sedrick Mccoy, not Dr. Ragini Rodriguez and he would need to call their office.

## 2021-07-16 ENCOUNTER — OFFICE VISIT (OUTPATIENT)
Dept: ORTHOPEDIC SURGERY | Age: 61
End: 2021-07-16
Payer: COMMERCIAL

## 2021-07-16 VITALS
OXYGEN SATURATION: 96 % | TEMPERATURE: 98.4 F | RESPIRATION RATE: 16 BRPM | HEIGHT: 67 IN | HEART RATE: 91 BPM | WEIGHT: 153 LBS | BODY MASS INDEX: 24.01 KG/M2

## 2021-07-16 DIAGNOSIS — M47.812 CERVICAL ARTHRITIS: ICD-10-CM

## 2021-07-16 DIAGNOSIS — S46.012A TRAUMATIC COMPLETE TEAR OF LEFT ROTATOR CUFF, INITIAL ENCOUNTER: Primary | ICD-10-CM

## 2021-07-16 DIAGNOSIS — S46.102A INJURY OF TENDON OF LONG HEAD OF LEFT BICEPS, INITIAL ENCOUNTER: ICD-10-CM

## 2021-07-16 PROCEDURE — 99214 OFFICE O/P EST MOD 30 MIN: CPT | Performed by: ORTHOPAEDIC SURGERY

## 2021-07-16 NOTE — PROGRESS NOTES
Patient: Evens Fierro MRN: [id number]       SSN: [id number]  YOB: 1960        AGE: 64 y.o. SEX: male  Body mass index is 23.96 kg/m². PCP: Bridget Méndez MD  07/16/21    Chief Complaint: Left shoulder MRI follow up    Pain 7/10    HPI: Evens Fierro is a 64 y.o. male patient who returns to the office today for his left shoulder. He had MRIs done of his left shoulder as well as the cervical spine. He continues to complain of left shoulder pain and dysfunction as well as pain that radiates down the left arm. The stem from a fall that he recently had. Past Medical History:   Diagnosis Date    Hypertension        History reviewed. No pertinent family history. Current Outpatient Medications   Medication Sig Dispense Refill    methocarbamoL (ROBAXIN) 500 mg tablet Take 1 Tablet by mouth two (2) times daily as needed for Muscle Spasm(s). 40 Tablet 1    diclofenac EC (VOLTAREN) 50 mg EC tablet Take 1 Tablet by mouth two (2) times a day. 40 Tablet 1    ergocalciferol (VITAMIN D2) 50,000 unit capsule Take 1 Cap by mouth every seven (7) days. (Patient not taking: Reported on 5/20/2021) 12 Cap 1    amLODIPine (NORVASC) 5 mg tablet Take 1 Tab by mouth daily. (Patient not taking: Reported on 6/9/2021) 30 Tab 5       No Known Allergies    Past Surgical History:   Procedure Laterality Date    [place] COLONOSCOPY W/BIOPSY SINGLE/MULTIPLE  1-8-16    Dr. Tao Hayes       Social History     Socioeconomic History    Marital status:      Spouse name: Not on file    Number of children: Not on file    Years of education: Not on file    Highest education level: Not on file   Occupational History    Not on file   Tobacco Use    Smoking status: Never Smoker    Smokeless tobacco: Never Used   Substance and Sexual Activity    Alcohol use:  Yes     Alcohol/week: 8.0 standard drinks     Types: 8 Cans of beer per week     Comment: weekends    Drug use: No    Sexual activity: Yes   Other Topics Concern    Not on file   Social History Narrative    Not on file     Social Determinants of Health     Financial Resource Strain:     Difficulty of Paying Living Expenses:    Food Insecurity:     Worried About Running Out of Food in the Last Year:     [address] in the Last Year:    Transportation Needs:     Lack of Transportation (Medical):  Lack of Transportation (Non-Medical):    Physical Activity:     Days of Exercise per Week:     Minutes of Exercise per Session:    Stress:     Feeling of Stress :    Social Connections:     Frequency of Communication with Friends and Family:     Frequency of Social Gatherings with Friends and Family:     Attends Christianity Services:     Active Member of Clubs or Organizations:     Attends Club or Organization Meetings:     Marital Status:    Intimate Partner Violence:     Fear of Current or Ex-Partner:     Emotionally Abused:     Physically Abused:     Sexually Abused:        REVIEW OF SYSTEMS:      No changes from previous review of systems unless noted. PHYSICAL EXAMINATION:  Visit Vitals  Pulse 91   Temp 98.4 °F (36.9 °C)   Resp 16   Ht 5' 7\" (1.702 m)   Wt 153 lb (69.4 kg)   SpO2 96%   BMI 23.96 kg/m²     Body mass index is 23.96 kg/m². GENERAL: Alert and oriented x3, in no acute distress. HEENT: Normocephalic, atraumatic. RESP: Non labored breathing. SKIN: No rashes or lesions noted.    Shoulder Examination     R   L  ROM   FF  Full   60/160  ER  Full   Full   IR  Full   Full  Rotator Cuff Pain   Supra  -   +   Infra  -   -   Subscap -   +  Crepitus  -   -  Effusion  -   -  Warmth  -   -   Erythema  -   -  Instability  -   -  AC Joint TTP  -   -  Clavicle   Deformity -   -   TTP  -   -  Proximal Humerus   Deformity -   -   TTP  -   -  Deltoid Strength 5   5  Biceps Strength 5   5  Biceps Deformity -   -  Biceps Groove Pain -   +  Impingement Sign -   -     Cervical Spine Examination  Neck ROM   Flexion    Full   Extension   Full   Lateral Rotation  Full  Spurling's    Neg  Focal Neuro Deficits   None  Sensation C5-T1   Full  Strength C5-T1   5/5  Tenderness C Spine   None  Stepoff C Spine   None  Paraspinal Tenderness  None  Other Findings   None      IMAGING:  An MRI of the left shoulder was reviewed in the office today. This shows full-thickness tears of the supraspinatus and subscapularis with minimal retraction. There is also a long head biceps tendinopathy    An MRI of the cervical spine was also reviewed which shows multilevel cervical degeneration with nerve impingement. ASSESSMENT & PLAN  Diagnosis: Left shoulder rotator cuff tear supraspinatus and subscapularis with biceps tendinopathy and cervical arthritic change with radiculopathy    I discussed today with Cheo Will the findings of his MRIs and his physical exam.  He continues to have weakness and pain in the shoulder with known rotator cuff tears as seen on his MRI. I recommended surgery in the form of arthroscopic rotator cuff repair and biceps tenodesis. He would like to move forward with surgery. For his neck I recommended that he see our spine service but will hold off on that for now while he awaits surgery on his shoulder and recovery of shoulder but I think the pain going down his arm is likely coming from his neck. He has no risk factors for surgery at this time. Electronically signed by: Josh Zhao MD    Note: This note was completed using voice recognition software.   Any typographical/name errors or mistakes are unintentional.

## 2021-07-23 DIAGNOSIS — M25.511 ACUTE PAIN OF RIGHT SHOULDER: ICD-10-CM

## 2021-07-23 RX ORDER — METHOCARBAMOL 500 MG/1
500 TABLET, FILM COATED ORAL
Qty: 40 TABLET | Refills: 1 | Status: SHIPPED | OUTPATIENT
Start: 2021-07-23

## 2021-07-26 DIAGNOSIS — S46.012A TRAUMATIC COMPLETE TEAR OF LEFT ROTATOR CUFF, INITIAL ENCOUNTER: Primary | ICD-10-CM

## 2021-07-26 DIAGNOSIS — S46.102A INJURY OF TENDON OF LONG HEAD OF LEFT BICEPS, INITIAL ENCOUNTER: ICD-10-CM

## 2021-07-26 DIAGNOSIS — Z01.812 PRE-OPERATIVE LABORATORY EXAMINATION: ICD-10-CM

## 2021-07-26 DIAGNOSIS — Z01.810 PRE-OPERATIVE CARDIOVASCULAR EXAMINATION: ICD-10-CM

## 2021-08-06 ENCOUNTER — DOCUMENTATION ONLY (OUTPATIENT)
Dept: ORTHOPEDIC SURGERY | Age: 61
End: 2021-08-06

## 2021-08-06 NOTE — PROGRESS NOTES
[employer], Employee Physical Capacities Form and Disability & FMLA Medical Certification, was received via fax and placed in the forms bin at .

## 2021-08-10 DIAGNOSIS — Z01.810 PRE-OPERATIVE CARDIOVASCULAR EXAMINATION: ICD-10-CM

## 2021-08-10 DIAGNOSIS — S46.012A TRAUMATIC COMPLETE TEAR OF LEFT ROTATOR CUFF, INITIAL ENCOUNTER: ICD-10-CM

## 2021-08-11 ENCOUNTER — HOSPITAL ENCOUNTER (OUTPATIENT)
Dept: LAB | Age: 61
Discharge: HOME | End: 2021-08-11
Payer: COMMERCIAL

## 2021-08-11 DIAGNOSIS — S46.012A TRAUMATIC COMPLETE TEAR OF LEFT ROTATOR CUFF, INITIAL ENCOUNTER: ICD-10-CM

## 2021-08-11 DIAGNOSIS — Z01.812 PRE-OPERATIVE LABORATORY EXAMINATION: ICD-10-CM

## 2021-08-11 LAB
ALBUMIN SERPL-MCNC: 3.6 G/DL (ref 3.4–5)
ALBUMIN/GLOB SERPL: 0.9 {RATIO} (ref 0.8–1.7)
ALP SERPL-CCNC: 81 U/L (ref 45–117)
ALT SERPL-CCNC: 45 U/L (ref 16–61)
ANION GAP SERPL CALC-SCNC: 5 MMOL/L (ref 3–18)
AST SERPL-CCNC: 25 U/L (ref 10–38)
ATRIAL RATE: 64 BPM
BASOPHILS # BLD: 0.1 K/UL (ref 0–0.1)
BASOPHILS NFR BLD: 1 % (ref 0–2)
BILIRUB SERPL-MCNC: 0.6 MG/DL (ref 0.2–1)
BUN SERPL-MCNC: 22 MG/DL (ref 7–18)
BUN/CREAT SERPL: 26 (ref 12–20)
CALCIUM SERPL-MCNC: 9.6 MG/DL (ref 8.5–10.1)
CALCULATED P AXIS, ECG09: 59 DEGREES
CALCULATED R AXIS, ECG10: 61 DEGREES
CALCULATED T AXIS, ECG11: 57 DEGREES
CHLORIDE SERPL-SCNC: 105 MMOL/L (ref 100–111)
CO2 SERPL-SCNC: 27 MMOL/L (ref 21–32)
CREAT SERPL-MCNC: 0.86 MG/DL (ref 0.6–1.3)
DIAGNOSIS, 93000: NORMAL
DIFFERENTIAL METHOD BLD: ABNORMAL
EOSINOPHIL # BLD: 0.1 K/UL (ref 0–0.4)
EOSINOPHIL NFR BLD: 2 % (ref 0–5)
ERYTHROCYTE [DISTWIDTH] IN BLOOD BY AUTOMATED COUNT: 14.6 % (ref 11.6–14.5)
GLOBULIN SER CALC-MCNC: 4.2 G/DL (ref 2–4)
GLUCOSE SERPL-MCNC: 112 MG/DL (ref 74–99)
HCT VFR BLD AUTO: 46.5 % (ref 36–48)
HGB BLD-MCNC: 14.3 G/DL (ref 13–16)
LYMPHOCYTES # BLD: 1.7 K/UL (ref 0.9–3.6)
LYMPHOCYTES NFR BLD: 31 % (ref 21–52)
MCH RBC QN AUTO: 23.8 PG (ref 24–34)
MCHC RBC AUTO-ENTMCNC: 30.8 G/DL (ref 31–37)
MCV RBC AUTO: 77.4 FL (ref 74–97)
MONOCYTES # BLD: 0.8 K/UL (ref 0.05–1.2)
MONOCYTES NFR BLD: 14 % (ref 3–10)
NEUTS SEG # BLD: 2.9 K/UL (ref 1.8–8)
NEUTS SEG NFR BLD: 52 % (ref 40–73)
P-R INTERVAL, ECG05: 174 MS
PLATELET # BLD AUTO: 296 K/UL (ref 135–420)
PMV BLD AUTO: 10.6 FL (ref 9.2–11.8)
POTASSIUM SERPL-SCNC: 3.9 MMOL/L (ref 3.5–5.5)
PROT SERPL-MCNC: 7.8 G/DL (ref 6.4–8.2)
Q-T INTERVAL, ECG07: 382 MS
QRS DURATION, ECG06: 88 MS
QTC CALCULATION (BEZET), ECG08: 394 MS
RBC # BLD AUTO: 6.01 M/UL (ref 4.35–5.65)
SODIUM SERPL-SCNC: 137 MMOL/L (ref 136–145)
VENTRICULAR RATE, ECG03: 64 BPM
WBC # BLD AUTO: 5.7 K/UL (ref 4.6–13.2)

## 2021-08-11 PROCEDURE — 85025 COMPLETE CBC W/AUTO DIFF WBC: CPT

## 2021-08-11 PROCEDURE — 93005 ELECTROCARDIOGRAM TRACING: CPT

## 2021-08-11 PROCEDURE — 80053 COMPREHEN METABOLIC PANEL: CPT

## 2021-08-11 PROCEDURE — 36415 COLL VENOUS BLD VENIPUNCTURE: CPT

## 2021-08-12 ENCOUNTER — TELEPHONE (OUTPATIENT)
Dept: ORTHOPEDIC SURGERY | Age: 61
End: 2021-08-12

## 2021-08-12 DIAGNOSIS — G89.18 PAIN FOLLOWING SURGERY OR PROCEDURE: ICD-10-CM

## 2021-08-12 DIAGNOSIS — G89.18 PAIN FOLLOWING SURGERY OR PROCEDURE: Primary | ICD-10-CM

## 2021-08-12 RX ORDER — OXYCODONE HYDROCHLORIDE 5 MG/1
5 TABLET ORAL
Qty: 35 TABLET | Refills: 0 | Status: SHIPPED | OUTPATIENT
Start: 2021-08-12 | End: 2021-08-19

## 2021-08-12 RX ORDER — OXYCODONE HYDROCHLORIDE 5 MG/1
5 TABLET ORAL
Qty: 35 TABLET | Refills: 0 | Status: CANCELLED | OUTPATIENT
Start: 2021-08-12 | End: 2021-08-19

## 2021-08-12 RX ORDER — OXYCODONE HYDROCHLORIDE 5 MG/1
5 TABLET ORAL
Qty: 35 TABLET | Refills: 0 | Status: SHIPPED | OUTPATIENT
Start: 2021-08-12 | End: 2021-08-12

## 2021-08-12 NOTE — TELEPHONE ENCOUNTER
Patient's wife states the [facility] does not have O[facility]. She is requesting the prescription be sent to the [facility] in [place] instead. Please advise.      Yonathan Becerra [phone]

## 2021-08-16 RX ORDER — HYDROMORPHONE HYDROCHLORIDE 2 MG/1
2 TABLET ORAL
Qty: 35 TABLET | Refills: 0 | Status: SHIPPED | OUTPATIENT
Start: 2021-08-16 | End: 2021-08-23

## 2021-08-16 NOTE — TELEPHONE ENCOUNTER
Post op patient wife Ruben James called again and said that the Oxycodone medication it not working that the patient pain level is and 8. Mrs. Jacqueline Dunaway is requesting a new pain medication for the patient from [facility]. [address] on [address] [place] [address]  Tel. [phone]    Mrs. [employer]. [phone].

## 2021-08-20 ENCOUNTER — DOCUMENTATION ONLY (OUTPATIENT)
Dept: ORTHOPEDIC SURGERY | Age: 61
End: 2021-08-20

## 2021-08-20 ENCOUNTER — OFFICE VISIT (OUTPATIENT)
Dept: ORTHOPEDIC SURGERY | Age: 61
End: 2021-08-20
Payer: COMMERCIAL

## 2021-08-20 VITALS — BODY MASS INDEX: 24.33 KG/M2 | HEIGHT: 67 IN | WEIGHT: 155 LBS | RESPIRATION RATE: 16 BRPM

## 2021-08-20 DIAGNOSIS — S46.102D INJURY OF TENDON OF LONG HEAD OF LEFT BICEPS, SUBSEQUENT ENCOUNTER: ICD-10-CM

## 2021-08-20 DIAGNOSIS — S46.012D TRAUMATIC COMPLETE TEAR OF LEFT ROTATOR CUFF, SUBSEQUENT ENCOUNTER: Primary | ICD-10-CM

## 2021-08-20 PROCEDURE — 99024 POSTOP FOLLOW-UP VISIT: CPT | Performed by: ORTHOPAEDIC SURGERY

## 2021-08-20 NOTE — PROGRESS NOTES
Patient: Trell Kim. MRN: [id number]       SSN: [id number]  YOB: 1960        AGE: 64 y.o. SEX: male  Body mass index is 24.28 kg/m². PCP: Veronique Gallego MD  08/20/21    Chief Complaint: Left shoulder follow up    Pain 6/10    HPI: Trell Forrest is a 64 y.o. male patient who is 1 week out from his left shoulder surgery. He is having some pain in his left shoulder which is to be expected. This was a large rotator cuff repair and biceps tenodesis. Past Medical History:   Diagnosis Date    Hypertension        No family history on file. Current Outpatient Medications   Medication Sig Dispense Refill    HYDROmorphone (Dilaudid) 2 mg tablet Take 1 Tablet by mouth every three (3) hours as needed for Pain for up to 7 days. Max Daily Amount: 16 mg. 35 Tablet 0    methocarbamoL (ROBAXIN) 500 mg tablet TAKE 1 TABLET BY MOUTH TWO (2) TIMES DAILY AS NEEDED FOR MUSCLE SPASM(S). 40 Tablet 1    diclofenac EC (VOLTAREN) 50 mg EC tablet Take 1 Tablet by mouth two (2) times a day. 40 Tablet 1    ergocalciferol (VITAMIN D2) 50,000 unit capsule Take 1 Cap by mouth every seven (7) days. (Patient not taking: Reported on 5/20/2021) 12 Cap 1    amLODIPine (NORVASC) 5 mg tablet Take 1 Tab by mouth daily. (Patient not taking: Reported on 6/9/2021) 30 Tab 5       No Known Allergies    Past Surgical History:   Procedure Laterality Date    MD ANESTH,SURGERY OF SHOULDER Left 08/12/2021    left shoulder A/S RCR BTS    [place] COLONOSCOPY W/BIOPSY SINGLE/MULTIPLE  1-8-16    Dr. Carter Dyson       Social History     Socioeconomic History    Marital status:      Spouse name: Not on file    Number of children: Not on file    Years of education: Not on file    Highest education level: Not on file   Occupational History    Not on file   Tobacco Use    Smoking status: Never Smoker    Smokeless tobacco: Never Used   Substance and Sexual Activity    Alcohol use:  Yes Alcohol/week: 8.0 standard drinks     Types: 8 Cans of beer per week     Comment: weekends    Drug use: No    Sexual activity: Yes   Other Topics Concern    Not on file   Social History Narrative    Not on file     Social Determinants of Health     Financial Resource Strain:     Difficulty of Paying Living Expenses:    Food Insecurity:     Worried About Running Out of Food in the Last Year:     [address] in the Last Year:    Transportation Needs:     Lack of Transportation (Medical):  Lack of Transportation (Non-Medical):    Physical Activity:     Days of Exercise per Week:     Minutes of Exercise per Session:    Stress:     Feeling of Stress :    Social Connections:     Frequency of Communication with Friends and Family:     Frequency of Social Gatherings with Friends and Family:     Attends Anglican Services:     Active Member of Clubs or Organizations:     Attends Club or Organization Meetings:     Marital Status:    Intimate Partner Violence:     Fear of Current or Ex-Partner:     Emotionally Abused:     Physically Abused:     Sexually Abused:        REVIEW OF SYSTEMS:      No changes from previous review of systems unless noted. PHYSICAL EXAMINATION:  Visit Vitals  Resp 16   Ht 5' 7\" (1.702 m)   Wt 155 lb (70.3 kg)   BMI 24.28 kg/m²     Body mass index is 24.28 kg/m². GENERAL: Alert and oriented x3, in no acute distress. HEENT: Normocephalic, atraumatic. RESP: Non labored breathing. SKIN: No rashes or lesions noted. Left shoulder surgical portal sites are clean. Sutures were removed. Neuro intact distally. IMAGING:  No imaging today    ASSESSMENT & PLAN  Diagnosis: Status post left shoulder arthroscopic rotator cuff repair and biceps tenodesis of massive rotator cuff tear    Larissa Hills will continue in his sling for the next about 4 to 5 weeks. We will plan to see him back in a month we will likely get him started into physical therapy.   His sutures were removed today and Steri-Strips were placed. Restrictions were discussed. At this point time his wife is also home with him for recovery and is also a lot of work. Electronically signed by: Jarad Ashford MD    Note: This note was completed using voice recognition software.   Any typographical/name errors or mistakes are unintentional.

## 2021-08-26 ENCOUNTER — TELEPHONE (OUTPATIENT)
Dept: ORTHOPEDIC SURGERY | Age: 61
End: 2021-08-26

## 2021-08-26 NOTE — TELEPHONE ENCOUNTER
Patient's wife's FMLA form completed, faxed and confirmation received. Copy made for scanning. Original placed in forms file at [facility]. Patient's wife made aware.

## 2021-09-09 ENCOUNTER — DOCUMENTATION ONLY (OUTPATIENT)
Dept: ORTHOPEDIC SURGERY | Age: 61
End: 2021-09-09

## 2021-09-17 ENCOUNTER — OFFICE VISIT (OUTPATIENT)
Dept: ORTHOPEDIC SURGERY | Age: 61
End: 2021-09-17
Payer: COMMERCIAL

## 2021-09-17 VITALS
HEIGHT: 67 IN | OXYGEN SATURATION: 98 % | WEIGHT: 156 LBS | TEMPERATURE: 97.5 F | BODY MASS INDEX: 24.48 KG/M2 | HEART RATE: 78 BPM

## 2021-09-17 DIAGNOSIS — S46.102D INJURY OF TENDON OF LONG HEAD OF LEFT BICEPS, SUBSEQUENT ENCOUNTER: ICD-10-CM

## 2021-09-17 DIAGNOSIS — S46.012D TRAUMATIC COMPLETE TEAR OF LEFT ROTATOR CUFF, SUBSEQUENT ENCOUNTER: Primary | ICD-10-CM

## 2021-09-17 PROCEDURE — 99024 POSTOP FOLLOW-UP VISIT: CPT | Performed by: ORTHOPAEDIC SURGERY

## 2021-09-17 NOTE — PROGRESS NOTES
Patient: Suzi Parker. MRN: [id number]       SSN: [id number]  YOB: 1960        AGE: 64 y.o. SEX: male  Body mass index is 24.43 kg/m². PCP: Cuba Spence MD  09/17/21    Chief Complaint: Left shoulder follow pu    HPI: Suzi Parker. is a 64 y.o. male patient who is now about 1 month out from his left shoulder arthroscopic massive rotator cuff repair. He has been in his sling. He is now back to work. He does complain of continued pain and pain at night. Past Medical History:   Diagnosis Date    Hypertension        No family history on file. Current Outpatient Medications   Medication Sig Dispense Refill    methocarbamoL (ROBAXIN) 500 mg tablet TAKE 1 TABLET BY MOUTH TWO (2) TIMES DAILY AS NEEDED FOR MUSCLE SPASM(S). 40 Tablet 1    diclofenac EC (VOLTAREN) 50 mg EC tablet Take 1 Tablet by mouth two (2) times a day. 40 Tablet 1    ergocalciferol (VITAMIN D2) 50,000 unit capsule Take 1 Cap by mouth every seven (7) days. (Patient not taking: Reported on 5/20/2021) 12 Cap 1    amLODIPine (NORVASC) 5 mg tablet Take 1 Tab by mouth daily. (Patient not taking: Reported on 6/9/2021) 30 Tab 5       No Known Allergies    Past Surgical History:   Procedure Laterality Date    RI ANESTH,SURGERY OF SHOULDER Left 08/12/2021    left shoulder A/S RCR BTS    RI COLONOSCOPY W/BIOPSY SINGLE/MULTIPLE  1-8-16    Dr. Isai Padilla       Social History     Socioeconomic History    Marital status:      Spouse name: Not on file    Number of children: Not on file    Years of education: Not on file    Highest education level: Not on file   Occupational History    Not on file   Tobacco Use    Smoking status: Never Smoker    Smokeless tobacco: Never Used   Substance and Sexual Activity    Alcohol use:  Yes     Alcohol/week: 8.0 standard drinks     Types: 8 Cans of beer per week     Comment: weekends    Drug use: No    Sexual activity: Yes   Other Topics Concern  Not on file   Social History Narrative    Not on file     Social Determinants of Health     Financial Resource Strain:     Difficulty of Paying Living Expenses:    Food Insecurity:     Worried About Running Out of Food in the Last Year:     [address] in the Last Year:    Transportation Needs:     Lack of Transportation (Medical):  Lack of Transportation (Non-Medical):    Physical Activity:     Days of Exercise per Week:     Minutes of Exercise per Session:    Stress:     Feeling of Stress :    Social Connections:     Frequency of Communication with Friends and Family:     Frequency of Social Gatherings with Friends and Family:     Attends Orthodoxy Services:     Active Member of Clubs or Organizations:     Attends Club or Organization Meetings:     Marital Status:    Intimate Partner Violence:     Fear of Current or Ex-Partner:     Emotionally Abused:     Physically Abused:     Sexually Abused:        REVIEW OF SYSTEMS:      No changes from previous review of systems unless noted. PHYSICAL EXAMINATION:  Visit Vitals  Pulse 78   Temp 97.5 °F (36.4 °C) (Temporal)   Ht 5' 7\" (1.702 m)   Wt 156 lb (70.8 kg)   SpO2 98%   BMI 24.43 kg/m²     Body mass index is 24.43 kg/m². GENERAL: Alert and oriented x3, in no acute distress. HEENT: Normocephalic, atraumatic. RESP: Non labored breathing. SKIN: No rashes or lesions noted. Left arm is in a sling. Range of motion strength not tested. IMAGING:  No imaging today    ASSESSMENT & PLAN  Diagnosis: 1 month status post left massive arthroscopic rotator cuff repair    I would like for Basim to start some home exercises and physical therapy today. He is to be out of work for at least another 6 weeks. I will see him back in a month. Electronically signed by: Cora Stephenson MD    Note: This note was completed using voice recognition software.   Any typographical/name errors or mistakes are unintentional.

## 2021-09-21 ENCOUNTER — HOSPITAL ENCOUNTER (OUTPATIENT)
Dept: PHYSICAL THERAPY | Age: 61
Discharge: HOME | End: 2021-09-21
Attending: ORTHOPAEDIC SURGERY
Payer: COMMERCIAL

## 2021-09-21 PROCEDURE — 97161 PT EVAL LOW COMPLEX 20 MIN: CPT

## 2021-09-21 PROCEDURE — 97110 THERAPEUTIC EXERCISES: CPT

## 2021-09-21 PROCEDURE — 97140 MANUAL THERAPY 1/> REGIONS: CPT

## 2021-09-21 NOTE — PROGRESS NOTES
PT DAILY TREATMENT NOTE/SHOULDER EVAL     Patient Name: Sofie Chávez. Date:2021  : 1960  [x]  Patient  Verified  Payor: BLUE CROSS / Plan: Karan Hameed [id number] PPO / Product Type: PPO /    In time:1025  Out time:1105  Total Treatment Time (min): 40  Visit #: 1 of 10    Medicare/BCBS Only   Total Timed Codes (min):  24 1:1 Treatment Time:  40       Treatment Area: Pain in left shoulder [M25.512]  Strain of muscle(s) and tendon(s) of the rotator cuff of left shoulder, subsequent encounter [S46.012D]      SUBJECTIVE  Pain Level (0-10 scale): 0  []constant [x]intermittent []improving []worsening []no change since onset     Any medication changes, allergies to medications, adverse drug reactions, diagnosis change, or new procedure performed?: [x] No    [] Yes (see summary sheet for update)  Subjective functional status/changes:       Subjective: Patient is a 64 y.o. male referred to PT with the above Dx. Patient seen today for S/P left massive RTC Repair on 21. Patient reports pain with moving the arm and have difficulty lifting arm even slightly with uninvolved UE     Patient presents to PT with decreased strength, decreased flexibility, and decreased mobility of the left shoulder. Patient s/s appear to be consistent w/ diagnosis. Patient demonstrates the potential to make functional gains within a reasonable time frame and will benefit from skilled PT to address impairments and improve functional mobility and strength for an improved quality of life. 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 Yard work, house work    Limitations to Activity/Recreation/PLOF: yard work,       Barriers: []pain []financial []time []transportation []other    Motivation: High    FABQ Score: []low []elevate    Cognition: A & O x 3    Other:    Risk For Falls:   []No  []low []elevate           OBJECTIVE/EXAMINATION  - Fwd shoulder  - TTP left infraspinatus/Teres minor/       AROM PROM Strength Pain? ?    Right Left Right Left Right Left    Elbow Flx  135  41      Elbow Ext -7 -15  37      Cx Flx          Cx Ext          Shoulder Flx  13        Shoulder Ext  10        Shoulder ABD  12        Shoulder ADD  NT        Shoulder IR  37        Shoulder ER  0           PROM Post treatment Flx 47 ABD 53             Modality rationale: decrease inflammation and decrease pain to improve the patients ability to tolerate post treatment soreness   Min Type Additional Details      [] Estim:  []Unatt       []IFC  []Premod                        []Other:  []w/ice   []w/heat  Position:  Location:      [] Estim: []Att    []TENS instruct  []NMES                    []Other:  []w/US   []w/ice   []w/heat  Position:  Location:      []  Traction: [] Cervical       []Lumbar                       [] Prone          []Supine                       []Intermittent   []Continuous Lbs:  [] before manual  [] after manual      []  Ultrasound: []Continuous   [] Pulsed                           []1MHz   []3MHz W/cm2:  Location:      []  Iontophoresis with dexamethasone         Location: [] Take home patch   [] In clinic     10 [x]  Ice During development of HEP    []  heat  []  Ice massage  []  Laser   []  Anodyne Position: Supine  Location: Left shoulder      []  Laser with stim  []  Other:  Position:  Location:      []  Vasopneumatic Device Pressure:       [] lo [] med [] hi   Temperature: [] lo [] med [] hi    [x] Skin assessment post-treatment:  [x]intact []redness- no adverse reaction []redness - adverse reaction:      16 min [x]Eval                  []Re-Eval         14 min Therapeutic Exercise:  [] See flow sheet : Develop and instruct HEP   Rationale: increase ROM, increase strength, and improve coordination to improve the patients ability to Initiate HEP and improve daily function     10 min Manual Therapy:  PROM with slight overstretch    Rationale: decrease pain, increase ROM, increase tissue extensibility and decrease trigger points to improve function                                                                   With   [x] TE   [] TA   [] neuro   [] other: Patient Education: [x] Review HEP    [] Progressed/Changed HEP based on:   [] positioning   [] body mechanics   [] transfers   [] heat/ice application    [] other:       Other Objective/Functional Measures:      Access Code: [email or web address]  URL: [email or web address]  Date: 09/21/2021  Prepared by: Des Wren    Exercises  Circular Shoulder Pendulum with Table Support - 2 x daily - 7 x weekly - 10 reps - 3 sets  Horizontal Shoulder Pendulum with Table Support - 2 x daily - 7 x weekly - 10 reps - 3 sets  Seated Shoulder Flexion Towel Slide at Table Top - 2 x daily - 7 x weekly - 10 reps - 3 sets  Seated Shoulder Abduction Towel Slide at Table Top - 2 x daily - 7 x weekly - 10 reps - 3 sets  Standing Shoulder Shrugs - 2 x daily - 7 x weekly - 10 reps - 3 sets  Standing Scapular Retraction - 2 x daily - 7 x weekly - 10 reps - 3 sets  Standing Shoulder Shrug Circles AROM Backward - 2 x daily - 7 x weekly - 10 reps - 3 sets  Isometric Shoulder Flexion at Wall - 2 x daily - 7 x weekly - 10 reps - 3 sets  Isometric Shoulder Extension at Wall - 2 x daily - 7 x weekly - 10 reps - 3 sets  Isometric Shoulder Abduction at Wall - 2 x daily - 7 x weekly - 10 reps - 3 sets  Standing Shoulder External Rotation Stretch in Doorway - 1 x daily - 7 x weekly - 3 sets - 10 reps  Seated Shoulder Internal Rotation PROM with Caregiver - 1 x daily - 7 x weekly - 3 sets - 10 reps    Pain Level (0-10 scale) post treatment: 2     ASSESSMENT/Changes in Function:        [x]  See Plan of Care  []  See progress note/recertification  []  See Discharge Summary         Progress towards goals / Updated goals:  Short Term Goals: To be accomplished in 5 treatments:  1. Pt will be compliant and independent with HEP in order to facilitate PT sessions and aid with self management   Eval Status:  Initiated   Current Status:  2. Pt to tolerate 30 min or more of TE and/or Interventions w/o increased s/s   Eval Status:  Initiated   Current Status:    Long Term Goals: To be accomplished in 10 treatments:  1. Pt will report 50% improvement or better with left shoulder dysfunction to show a significant increase in ability to tolerate ADL   Eval Status:  Initiated   Current Status:  2. Pt will demonstrate PROM left shoulder Flx/AB to 90* with pain 3/10 or better for progress to performing AAROM with daily exercises    Eval Status:  Very limited PROM ~40*   Current Status:  3. Pt will initiate AROM to shoulder level for carryover to improving left shoulder function for progress to normal elevation   Eval Status:  Limited AROM at 10-12* Flx/ABD   Current Status:  4. Pt will perform cone stack to shoulder level for progress to reaching overhead to perform usual work tasks     Eval Status:  Initiated   Current Status:  5.   Pt will improve FOTO score to 70 in 22 visits to show significant improvement in function for progress to good shoulder function with daily activity   Eval Status: FOTO = 38   Current Status:      PLAN  [x]  Upgrade activities as tolerated     [x]  Continue plan of care  []  Update interventions per flow sheet       []  Discharge due to:_  []  Other:_         Joey Tang, PT 9/21/2021  10:28 AM

## 2021-09-21 NOTE — PROGRESS NOTES
[facility] at [address] [facility] [address]  [address]. [facility]  Phone: [phone]      Fax:  [phone]    Plan of Care/ Statement of Necessity for Physical Therapy Services  Patient name: Suzi Kim Start of Care: 2021   Referral source: Jet Hollis MD : 1960    Medical Diagnosis: Pain in left shoulder [M25.512]  Strain of muscle(s) and tendon(s) of the rotator cuff of left shoulder, subsequent encounter [S46.012D]  Payor: BLUE CROSS / Plan: [facility] PPO / Product Type: PPO /  Onset Date:21    Treatment Diagnosis: Left ShoulderPain   Prior Hospitalization: see medical history Provider#: [id number]   Medications: Verified on Patient summary List   Comorbidities: OA  Prior Level of Function: Able to use left arm for work and usual activity      The Plan of Care and following information is based on the information from the initial evaluation. Assessment/ key information: Patient is a 64 y.o. male referred to PT with the above Dx. Patient seen today for S/P left massive RTC Repair on 21. Patient reports pain with moving the arm and have difficulty lifting arm even slightly with uninvolved UE      Patient presents to PT with decreased strength, decreased flexibility, and decreased mobility of the left shoulder. Patient s/s appear to be consistent w/ diagnosis. Patient demonstrates the potential to make functional gains within a reasonable time frame and will benefit from skilled PT to address impairments and improve functional mobility and strength for an improved quality of life.   Fall Risk Assessment: Patient demonstrates no Fall Risk    Evaluation Complexity History MEDIUM  Complexity : 1-2 comorbidities / personal factors will impact the outcome/ POC ; Examination LOW Complexity : 1-2 Standardized tests and measures addressing body structure, function, activity limitation and / or participation in recreation ;Presentation LOW Complexity : Stable, uncomplicated  ;Clinical Decision Making MEDIUM Complexity : FOTO score of 26-74  Overall Complexity Rating: LOW   Problem List: pain affecting function, decrease ROM, decrease strength, decrease ADL/ functional abilitiies, decrease activity tolerance, decrease flexibility/ joint mobility, decrease transfer abilities and other FOTO = 38   Treatment Plan may include any combination of the following: Therapeutic exercise, Therapeutic activities, Neuromuscular re-education, Physical agent/modality, Manual therapy, Patient education, Self Care training, Functional mobility training and Home safety training  Patient / Family readiness to learn indicated by: asking questions, trying to perform skills and interest  Persons(s) to be included in education: patient (P)  Barriers to Learning/Limitations: None  Patient Goal (s): To get better so I can use my arm again     Patient Self Reported Health Status: good  Rehabilitation Potential: good    Short Term Goals: To be accomplished in 5 treatments:  1. Pt will be compliant and independent with HEP in order to facilitate PT sessions and aid with self management   Eval Status:  Initiated   Current Status:  2. Pt to tolerate 30 min or more of TE and/or Interventions w/o increased s/s   Eval Status:  Initiated   Current Status:    Long Term Goals: To be accomplished in 10 treatments:  1. Pt will report 50% improvement or better with left shoulder dysfunction to show a significant increase in ability to tolerate ADL   Eval Status:  Initiated   Current Status:  2. Pt will demonstrate PROM left shoulder Flx/AB to 90* with pain 3/10 or better for progress to performing AAROM with daily exercises    Eval Status:  Very limited PROM ~40*   Current Status:  3.   Pt will initiate AROM to shoulder level for carryover to improving left shoulder function for progress to normal elevation   Eval Status:  Limited AROM at 10-12* Flx/ABD   Current Status:  4. Pt will perform cone stack to shoulder level for progress to reaching overhead to perform usual work tasks     Eval Status:  Initiated   Current Status:  5. Pt will improve FOTO score to 70 in 22 visits to show significant improvement in function for progress to good shoulder function with daily activity   Eval Status: FOTO = 38   Current Status:     Frequency / Duration: Patient to be seen 2-3 times per week for 10 treatments. Patient/ Caregiver education and instruction: Diagnosis, prognosis, self care, activity modification and exercises   [x]  Plan of care has been reviewed with PTA  Comments:  Patient provided w/HEP      Iveth Plate, PT 9/21/2021 10:22 AM  _____________________________________________________________________  I certify that the above Therapy Services are being furnished while the patient is under my care. I agree with the treatment plan and certify that this therapy is necessary.     Physician's Signature:____________Date:_________TIME:________     Nai Ibarra MD  ** Signature, Date and Time must be completed for valid certification **    Please sign and return to [facility] at [address] [facility]  [place], [facility] [address]  Phone: [phone]      Fax:  [phone]

## 2021-09-24 ENCOUNTER — HOSPITAL ENCOUNTER (OUTPATIENT)
Dept: PHYSICAL THERAPY | Age: 61
Discharge: HOME | End: 2021-09-24
Attending: ORTHOPAEDIC SURGERY
Payer: COMMERCIAL

## 2021-09-24 PROCEDURE — 97014 ELECTRIC STIMULATION THERAPY: CPT

## 2021-09-24 PROCEDURE — 97110 THERAPEUTIC EXERCISES: CPT

## 2021-09-24 PROCEDURE — 97140 MANUAL THERAPY 1/> REGIONS: CPT

## 2021-09-24 NOTE — PROGRESS NOTES
PT DAILY TREATMENT NOTE     Patient Name: Mikki Gray. Date:2021  : 1960  [x]  Patient  Verified  Payor: BLUE DEVORA / Plan: Karan Hameed [id number] PPO / Product Type: PPO /    In time:2:00  Out time: 3:00  Total Treatment Time (min): 60  Visit #: 2 of 10    Medicare/BCBS Only   Total Timed Codes (min):  35 1:1 Treatment Time:  35       Treatment Area: Pain in left shoulder [M25.512]  Strain of muscle(s) and tendon(s) of the rotator cuff of left shoulder, subsequent encounter [S46.012D]    SUBJECTIVE  Pain Level (0-10 scale): 0  Any medication changes, allergies to medications, adverse drug reactions, diagnosis change, or new procedure performed?: [x] No    [] Yes (see summary sheet for update)  Subjective functional status/changes:   [] No changes reported  No pain with not moving the arm increases to 7/10 with exercise.   Some compliance with HEP    OBJECTIVE    Modality rationale: decrease inflammation, decrease pain and increase tissue extensibility to improve the patients ability to tolerate post treatment session   Min Type Additional Details   15 [x] Estim:  [x]Unatt       [x]IFC  []Premod                        []Other:  [x]w/ice   []w/heat  Position: Supine HOP Elev  Location: Left shoulder    [] Estim: []Att    []TENS instruct  []NMES                    []Other:  []w/US   []w/ice   []w/heat  Position:  Location:    []  Traction: [] Cervical       []Lumbar                       [] Prone          []Supine                       []Intermittent   []Continuous Lbs:  [] before manual  [] after manual    []  Ultrasound: []Continuous   [] Pulsed                           []1MHz   []3MHz W/cm2:  Location:    []  Iontophoresis with dexamethasone         Location: [] Take home patch   [] In clinic   10 []  Ice     [x]  Heat 10'  []  Ice massage  []  Laser   []  Anodyne Position: Supine with HOP Elev  Location: Left Shoulder    []  Laser with stim  []  Other:  Position:  Location:    [] Vasopneumatic Device    []  Right     []  Left  Pre-treatment girth:  Post-treatment girth:  Measured at (location):  Pressure:       [] lo [] med [] hi   Temperature: [] lo [] med [] hi   [x] Skin assessment post-treatment:  [x]intact []redness- no adverse reaction    []redness - adverse reaction:     10 min Therapeutic Exercise:  [x] See flow sheet :   Rationale: increase ROM, increase strength and improve coordination to improve the patients ability to tolerate increased activity    25 min Manual Therapy:  PROM with overstretch, STM/DTM/TPR left infraspinatus, Scapular mobs, LAD left UE Rhomboid str   The manual therapy interventions were performed at a separate and distinct time from the therapeutic activities interventions. Rationale: decrease pain, increase ROM, increase tissue extensibility and decrease trigger points to tolerate increased activity       With   [x] TE   [] TA   [] neuro   [] other: Patient Education: [x] Review HEP    [] Progressed/Changed HEP based on:   [] positioning   [] body mechanics   [] transfers   [] heat/ice application    [] other:      Other Objective/Functional Measures:   - Continued significant tightness of the left GH joint       Pain Level (0-10 scale) post treatment: 8    ASSESSMENT/Changes in Function:   - Little tolerance to PROM and over stretch,  - Little tolerance to STM left infraspinatus  - Limited mobility of the left GH joint with significant guarding  - Pt unable to demo pendulums due to guarding  - PROM Should Flx about 25* ABD about 40*    Patient will continue to benefit from skilled PT services to modify and progress therapeutic interventions, address functional mobility deficits, address ROM deficits, address strength deficits, analyze and address soft tissue restrictions and analyze and cue movement patterns to attain remaining goals.      []  See Plan of Care  []  See progress note/recertification  []  See Discharge Summary         Short Term Goals: To be accomplished in 5 treatments:  1. Pt will be compliant and independent with HEP in order to facilitate PT sessions and aid with self management             Eval Status:  Initiated             Current Status: Minor compliance with HEP 9/24/21  2. Pt to tolerate 30 min or more of TE and/or Interventions w/o increased s/s             Eval Status:  Initiated             Current Status: significant increase in pain after treatment 9/24/21     Long Term Goals: To be accomplished in 10 treatments:  1. Pt will report 50% improvement or better with left shoulder dysfunction to show a significant increase in ability to tolerate ADL             Eval Status:  Initiated             Current Status:  2. Pt will demonstrate PROM left shoulder Flx/AB to 90* with pain 3/10 or better for progress to performing AAROM with daily exercises              Eval Status:  Very limited PROM ~40*             Current Status:  3. Pt will initiate AROM to shoulder level for carryover to improving left shoulder function for progress to normal elevation             Eval Status:  Limited AROM at 10-12* Flx/ABD             Current Status:  4. Pt will perform cone stack to shoulder level for progress to reaching overhead to perform usual work tasks               Eval Status:  Initiated             Current Status:  5.   Pt will improve FOTO score to 70 in 22 visits to show significant improvement in function for progress to good shoulder function with daily activity             Eval Status: FOTO = 38             Current Status:     PLAN  [x]  Upgrade activities as tolerated     [x]  Continue plan of care  []  Update interventions per flow sheet       []  Discharge due to:_  []  Other:_      Jhonny Gamble, PT 9/24/2021  2:12 PM    Future Appointments   Date Time Provider Billie Turcios   9/27/2021 11:00 AM Eric Park, PT [facility]   9/29/2021  5:00 PM Eric Park, PT [facility]   10/5/2021 10:15 [facility]   10/6/2021  9:30 AM Harini Stephens MD [facility]   10/8/2021  8:45 AM Logan Edu [facility]   10/11/2021  8:45 AM Logan Edu [facility]   10/14/2021 10:15 AM Ayesha Garcia [facility]   10/15/2021  8:45 AM Valentin Jang MD [facility]   10/18/2021  8:45 AM Maggie Azevedo PTA [facility]   10/22/2021  8:45 AM Lu Mckenzie, PT [facility]

## 2021-09-27 ENCOUNTER — HOSPITAL ENCOUNTER (OUTPATIENT)
Dept: PHYSICAL THERAPY | Age: 61
Discharge: HOME | End: 2021-09-27
Attending: ORTHOPAEDIC SURGERY
Payer: COMMERCIAL

## 2021-09-27 PROCEDURE — 97110 THERAPEUTIC EXERCISES: CPT

## 2021-09-27 PROCEDURE — 97140 MANUAL THERAPY 1/> REGIONS: CPT

## 2021-09-27 NOTE — PROGRESS NOTES
PT DAILY TREATMENT NOTE     Patient Name: Issac Pereyra. Date:2021  : 1960  [x]  Patient  Verified  Payor: BLUE CROSS / Plan: Karan Hameed [id number] PPO / Product Type: PPO /    In time:1100  Out time:1202  Total Treatment Time (min): 62  Visit #: 3 of 10    Medicare/BCBS Only   Total Timed Codes (min):  62 1:1 Treatment Time:  62       Treatment Area: Pain in left shoulder [M25.512]  Strain of muscle(s) and tendon(s) of the rotator cuff of left shoulder, subsequent encounter [S46.012D]    SUBJECTIVE  Pain Level (0-10 scale): 0  Any medication changes, allergies to medications, adverse drug reactions, diagnosis change, or new procedure performed?: [x] No    [] Yes (see summary sheet for update)  Subjective functional status/changes:   [] No changes reported  No pain at rest but it gets bad with moving it.     OBJECTIVE    Modality rationale: decrease inflammation, decrease pain and increase tissue extensibility to improve the patients ability to improve activity tolerance   Min Type Additional Details    [] Estim:  []Unatt       []IFC  []Premod                        []Other:  []w/ice   []w/heat  Position: Supine  Location: Left Shoulder    [] Estim: []Att    []TENS instruct  []NMES                    []Other:  []w/US   []w/ice   []w/heat  Position:  Location:    []  Traction: [] Cervical       []Lumbar                       [] Prone          []Supine                       []Intermittent   []Continuous Lbs:  [] before manual  [] after manual   10 [x]  Ultrasound: [x]Continuous   [] Pulsed                           [x]1MHz   []3MHz W/cm2: 1.7  Location: Left Pec/deltoids/Teres Minor/Infraspinatus    []  Iontophoresis with dexamethasone         Location: [] Take home patch   [] In clinic    []  Ice     []  heat  []  Ice massage  []  Laser   []  Anodyne Position:  Location:    []  Laser with stim  []  Other:  Position:  Location:    []  Vasopneumatic Device    []  Right     [] Left  Pre-treatment girth:  Post-treatment girth:  Measured at (location):  Pressure:       [] lo [] med [] hi   Temperature: [] lo [] med [] hi   [x] Skin assessment post-treatment:  [x]intact []redness- no adverse reaction    []redness - adverse reaction:     38 min Therapeutic Exercise:  [x] See flow sheet :   Rationale: increase ROM, increase strength and improve coordination to improve the patients ability to perform increased activity    14 min Manual Therapy:  STM/DTM/Effleurage Left deltoids/Pec/Infraspinatus/Teres minor/UT/Levator/Rhomboid   The manual therapy interventions were performed at a separate and distinct time from the therapeutic activities interventions. Rationale: increase ROM, increase tissue extensibility and decrease trigger points to tolerate increased activity          With   [x] TE   [] TA   [] neuro   [] other: Patient Education: [x] Review HEP    [] Progressed/Changed HEP based on:   [] positioning   [] body mechanics   [] transfers   [] heat/ice application    [] other:      Other Objective/Functional Measures:   - Pain with PROM  - Add Finger Ladder, Pendulums, Table Flx. ABD Str, Table walk outs Flx/ABD  - Add Sub max Iso 5x5\" Flx/ABD/Ext/ADD  - Improved mobility with Shoulder Flx/ABD PROM to > 50*    Pain Level (0-10 scale) post treatment: 7    ASSESSMENT/Changes in Function:   - Increased tolerance with activity today but still has limited tolerance to mobility  - Tight tissue left pec/lateral upper arm, Left Rhomboid region    Patient will continue to benefit from skilled PT services to modify and progress therapeutic interventions, address functional mobility deficits, address ROM deficits, address strength deficits, analyze and address soft tissue restrictions and analyze and cue movement patterns to attain remaining goals. []  See Plan of Care  []  See progress note/recertification  []  See Discharge Summary         Short Term Goals: To be accomplished in 5 treatments:  1.  Pt will be compliant and independent with HEP in order to facilitate PT sessions and aid with self management             Eval Status:  Initiated             Current Status: Minor compliance with HEP 9/24/21  2.  Pt to tolerate 30 min or more of TE and/or Interventions w/o increased s/s             Eval Status:  Initiated             Current Status: significant increase in pain after treatment 9/24/21     Long Term Goals: To be accomplished in 10 treatments:  1.  Pt will report 50% improvement or better with left shoulder dysfunction to show a significant increase in ability to tolerate ADL             Eval Status:  Initiated             Current Status:  2.  Pt will demonstrate PROM left shoulder Flx/AB to 90* with pain 3/10 or better for progress to performing AAROM with daily exercises              Eval Status:  Very limited PROM ~40*             Current Status: Improved ABD/Flx to 50* 9/27/21  3.  Pt will initiate AROM to shoulder level for carryover to improving left shoulder function for progress to normal elevation             Eval Status:  Limited AROM at 10-12* Flx/ABD             Current Status:  4.  Pt will perform cone stack to shoulder level for progress to reaching overhead to perform usual work tasks               Eval Status:  Initiated             Current Status:  5.  Pt will improve FOTO score to 70 in 22 visits to show significant improvement in function for progress to good shoulder function with daily activity             Eval Status: FOTO = 38             Current Status:     PLAN  [x]  Upgrade activities as tolerated     [x]  Continue plan of care  []  Update interventions per flow sheet       []  Discharge due to:_  []  Other:_      Shannan Dunn, PT 9/27/2021  11:12 AM    Future Appointments   Date Time Provider Billie Turcios   9/29/2021  5:00 PM Dahlia Rodriguez [facility]   10/5/2021 10:15 AM [place] [facility]   10/6/2021  9:30 AM Oriana Garza MD [facility] BS AMB 10/8/2021  8:45 AM Brennen [facility]   10/11/2021  8:45 [facility]   10/14/2021 10:15 AM Thong Islas [facility]   10/15/2021  8:45 AM Craig Young MD [facility] BS AMB   10/18/2021  8:45 AM Marilu Sanchez PTA [facility]   10/22/2021  8:45 AM Argenis Stringer PT [facility]

## 2021-09-29 ENCOUNTER — HOSPITAL ENCOUNTER (OUTPATIENT)
Dept: PHYSICAL THERAPY | Age: 61
Discharge: HOME | End: 2021-09-29
Attending: ORTHOPAEDIC SURGERY
Payer: COMMERCIAL

## 2021-09-29 PROCEDURE — 97035 APP MDLTY 1+ULTRASOUND EA 15: CPT

## 2021-09-29 PROCEDURE — 97140 MANUAL THERAPY 1/> REGIONS: CPT

## 2021-09-29 PROCEDURE — 97110 THERAPEUTIC EXERCISES: CPT

## 2021-09-29 NOTE — PROGRESS NOTES
PT DAILY TREATMENT NOTE     Patient Name: Issac Pereyra.   Date:2021  : 1960  [x]  Patient  Verified  Payor: BLUE CROSS / Plan: Karan Hameed [id number] PPO / Product Type: PPO /    In time:5:00  Out time:5:45  Total Treatment Time (min): 45  Visit #: 4 of 10    Medicare/BCBS Only   Total Timed Codes (min):  45 1:1 Treatment Time:  45       Treatment Area: Pain in left shoulder [M25.512]  Strain of muscle(s) and tendon(s) of the rotator cuff of left shoulder, subsequent encounter [S46.012D]    SUBJECTIVE  Pain Level (0-10 scale): 0  Any medication changes, allergies to medications, adverse drug reactions, diagnosis change, or new procedure performed?: [x] No    [] Yes (see summary sheet for update)  Subjective functional status/changes:   [] No changes reported  Doing the exercises    OBJECTIVE    Modality rationale: decrease inflammation, decrease pain and increase tissue extensibility to improve the patients ability to tolerate increased activity   Min Type Additional Details    [] Estim:  []Unatt       []IFC  []Premod                        []Other:  []w/ice   []w/heat  Position:  Location:    [] Estim: []Att    []TENS instruct  []NMES                    []Other:  []w/US   []w/ice   []w/heat  Position:  Location:    []  Traction: [] Cervical       []Lumbar                       [] Prone          []Supine                       []Intermittent   []Continuous Lbs:  [] before manual  [] after manual   10 [x]  Ultrasound: [x]Continuous   [] Pulsed                           [x]1MHz   [x]3MHz W/cm2: 1.7, 1.3  Location: Infraspinatus/Rhomboid / Left Deltoids and [address] Joint    []  Iontophoresis with dexamethasone         Location: [] Take home patch   [] In clinic    []  Ice     []  heat  []  Ice massage  []  Laser   []  Anodyne Position:  Location:    []  Laser with stim  []  Other:  Position:  Location:    []  Vasopneumatic Device    []  Right     []  Left  Pre-treatment girth:  Post-treatment girth: Measured at (location):  Pressure:       [] lo [] med [] hi   Temperature: [] lo [] med [] hi   [x] Skin assessment post-treatment:  [x]intact []redness- no adverse reaction    []redness - adverse reaction:     18 min Therapeutic Exercise:  [x] See flow sheet :   Rationale: increase ROM, increase strength and improve coordination to improve the patients ability to improve daily use of the left UE    17 min Manual Therapy:  PROM with over stretch, STM/DTM/TPR Left infraspinatus/Rhomboid, Scapular mobs, SC/AC joint mobs, LAD Left UE   The manual therapy interventions were performed at a separate and distinct time from the therapeutic activities interventions. Rationale: decrease pain, increase ROM, increase tissue extensibility and decrease trigger points to improve daily activity          With   [x] TE   [] TA   [] neuro   [] other: Patient Education: [x] Review HEP    [] Progressed/Changed HEP based on:   [] positioning   [] body mechanics   [] transfers   [] heat/ice application    [] other:      Other Objective/Functional Measures:   - Guarding of the left UE  - Pt arrives with decreased mobility of the left UE while guarding in ADD/IR at front of body  - PROM Flx/ABD 60/58* respectively after treatment     Pain Level (0-10 scale) post treatment: 7    ASSESSMENT/Changes in Function:   - Continued significant guarding of the left UE  - Progressing slowly due to pain and limited tolerance of PROM  - Improved PROM and improved motion of the left shoulder with shrugs/Pinches/Retro Circles    Patient will continue to benefit from skilled PT services to modify and progress therapeutic interventions, address functional mobility deficits, address ROM deficits, address strength deficits, analyze and address soft tissue restrictions and analyze and cue movement patterns to attain remaining goals.      []  See Plan of Care  []  See progress note/recertification  []  See Discharge Summary         Short Term Goals: To be accomplished in 5 treatments:  1.  Pt will be compliant and independent with HEP in order to facilitate PT sessions and aid with self management             Eval Status:  Initiated             Current Status: Minor compliance with HEP 9/24/21  2.  Pt to tolerate 30 min or more of TE and/or Interventions w/o increased s/s             Eval Status:  Initiated             Current Status: significant increase in pain after treatment 9/29/21     Long Term Goals: To be accomplished in 10 treatments:  1.  Pt will report 50% improvement or better with left shoulder dysfunction to show a significant increase in ability to tolerate ADL             Eval Status:  Initiated             Current Status:  2.  Pt will demonstrate PROM left shoulder Flx/AB to 90* with pain 3/10 or better for progress to performing AAROM with daily exercises              Eval Status:  Very limited PROM ~40*             Current Status: Improved ABD/Flx to 60* after treatment 9/29/21  3.  Pt will initiate AROM to shoulder level for carryover to improving left shoulder function for progress to normal elevation             Eval Status:  Limited AROM at 10-12* Flx/ABD             Current Status:  4.  Pt will perform cone stack to shoulder level for progress to reaching overhead to perform usual work tasks               Eval Status:  Initiated             Current Status:  5.  Pt will improve FOTO score to 70 in 22 visits to show significant improvement in function for progress to good shoulder function with daily activity             Eval Status: FOTO = 38             Current Status:     PLAN  []  Upgrade activities as tolerated     []  Continue plan of care  []  Update interventions per flow sheet       []  Discharge due to:_  []  Other:_      Moncho Pierre, PT 9/29/2021  6:28 PM    Future Appointments   Date Time Provider Billie Turcios   10/5/2021 10:15 AM Maritza Wren PTA [facility] [place] [facility]   10/6/2021  9:30 AM Marilyn Barrios MD [facility] BS AMB 10/8/2021  8:45 AM Ita Saunders [facility]   10/11/2021  8:45 AM Ita [facility]   10/14/2021 10:15 AM Ludmila Farmer [facility]   10/15/2021  8:45 AM Lisa Roldan MD [facility] BS AMB   10/18/2021  8:45 AM Giovani Barlow PTA [facility]   10/22/2021  8:45 AM Jone Andreson, PT [facility]

## 2021-10-05 ENCOUNTER — HOSPITAL ENCOUNTER (OUTPATIENT)
Dept: PHYSICAL THERAPY | Age: 61
Discharge: HOME | End: 2021-10-05
Attending: ORTHOPAEDIC SURGERY
Payer: COMMERCIAL

## 2021-10-05 PROCEDURE — 97035 APP MDLTY 1+ULTRASOUND EA 15: CPT

## 2021-10-05 PROCEDURE — 97110 THERAPEUTIC EXERCISES: CPT

## 2021-10-05 PROCEDURE — 97140 MANUAL THERAPY 1/> REGIONS: CPT

## 2021-10-05 NOTE — PROGRESS NOTES
PT DAILY TREATMENT NOTE     Patient Name: Ely Wilson.   Date:10/5/2021  : 1960  [x]  Patient  Verified  Payor: BLUE CROSS / Plan: Karan Hameed [id number] PPO / Product Type: PPO /    In time:1015  Out time:1105  Total Treatment Time (min): 50  Visit #: 5 of 10    Medicare/BCBS Only   Total Timed Codes (min):  40 1:1 Treatment Time:  40       Treatment Area: Pain in left shoulder [M25.512]  Strain of muscle(s) and tendon(s) of the rotator cuff of left shoulder, subsequent encounter [S46.620D]    SUBJECTIVE  Pain Level (0-10 scale): 7  Any medication changes, allergies to medications, adverse drug reactions, diagnosis change, or new procedure performed?: [x] No    [] Yes (see summary sheet for update)  Subjective functional status/changes:   [] No changes reported  Patient reported continued inability to move left UE independently at shoulder and increasing pain     OBJECTIVE    Modality rationale: decrease inflammation and decrease pain to improve the patients ability to perform ADLs   Min Type Additional Details    [] Estim:  []Unatt       []IFC  []Premod                        []Other:  []w/ice   []w/heat  Position:  Location:    [] Estim: []Att    []TENS instruct  []NMES                    []Other:  []w/US   []w/ice   []w/heat  Position:  Location:    []  Traction: [] Cervical       []Lumbar                       [] Prone          []Supine                       []Intermittent   []Continuous Lbs:  [] before manual  [] after manual   10 [x]  Ultrasound: [x]Continuous   [] Pulsed                           [x]1MHz   []3MHz W/cm2: 1.7, 1.3  Location: Infraspinatus/Rhomboid / Left Deltoids and [address] Joint    []  Iontophoresis with dexamethasone         Location: [] Take home patch   [] In clinic   10 [x]  Ice     []  heat  []  Ice massage  []  Laser   []  Anodyne Position:seated  Location:left shoulder    []  Laser with stim  []  Other:  Position:  Location:    []  Vasopneumatic Device    []  Right []  Left  Pre-treatment girth:  Post-treatment girth:  Measured at (location):  Pressure:       [] lo [] med [] hi   Temperature: [] lo [] med [] hi   [] Skin assessment post-treatment:  []intact []redness- no adverse reaction    []redness - adverse reaction:     20 min Therapeutic Exercise:  [] See flow sheet :   Rationale: increase ROM and increase strength to improve the patients ability to perform ADLs        10 min Manual Therapy:   PROM with over stretch, STM/DTM/TPR Left infraspinatus/Rhomboid, Scapular mobs, SC/AC joint mobs, LAD Left UE   The manual therapy interventions were performed at a separate and distinct time from the therapeutic activities interventions. Rationale: decrease pain, increase ROM and increase tissue extensibility to perform ADLs              With   [] TE   [] TA   [] neuro   [] other: Patient Education: [x] Review HEP    [] Progressed/Changed HEP based on:   [] positioning   [] body mechanics   [] transfers   [] heat/ice application    [] other:      Other Objective/Functional Measures: 8% improvement reported     Pain Level (0-10 scale) post treatment: 6    ASSESSMENT/Changes in Function: patient continues to have increased pain with PROM of left shoulder above approx 60 degrees of flexion and scaption and is unable to perform AROM at this time. Patient will continue to benefit from skilled PT services to modify and progress therapeutic interventions, address functional mobility deficits, address ROM deficits, address strength deficits, analyze and address soft tissue restrictions and analyze and cue movement patterns to attain remaining goals.      [x]  See Plan of Care  []  See progress note/recertification  []  See Discharge Summary         Progress towards goals / Updated goals:  Short Term Goals: To be accomplished in 5 treatments:  1.  Pt will be compliant and independent with HEP in order to facilitate PT sessions and aid with self management             Eval Status:  Initiated             Current Status: Minor compliance with HEP 9/24/21  2.  Pt to tolerate 30 min or more of TE and/or Interventions w/o increased s/s             Eval Status:  Initiated             Current Status: significant increase in pain after treatment 9/29/21     Long Term Goals: To be accomplished in 10 treatments:  1.  Pt will report 50% improvement or better with left shoulder dysfunction to show a significant increase in ability to tolerate ADL             Eval Status:  Initiated             Current Status: 8% improvement reported (10/5/21)  2.  Pt will demonstrate PROM left shoulder Flx/AB to 90* with pain 3/10 or better for progress to performing AAROM with daily exercises              Eval Status:  Very limited PROM ~40*             Current Status: Improved ABD/Flx to 60* after treatment 9/29/21  3.  Pt will initiate AROM to shoulder level for carryover to improving left shoulder function for progress to normal elevation             Eval Status:  Limited AROM at 10-12* Flx/ABD             Current Status:  4.  Pt will perform cone stack to shoulder level for progress to reaching overhead to perform usual work tasks               Eval Status:  Initiated             Current Status:  5.  Pt will improve FOTO score to 70 in 22 visits to show significant improvement in function for progress to good shoulder function with daily activity             Eval Status: FOTO = 38             Current Status:     PLAN  []  Upgrade activities as tolerated     [x]  Continue plan of care  []  Update interventions per flow sheet       []  Discharge due to:_  []  Other:_      Renetta Pierce PTA 10/5/2021  10:39 AM    Future Appointments   Date Time Provider Billie Lemosisti   10/6/2021  9:30 AM Safia Craft MD [facility]   10/8/2021  8:45 AM Foster Simper [facility]   10/11/2021  8:45 AM Foster Simper [facility]   10/14/2021 10:15 AM Tania Hagan PTA [facility]   10/15/2021  8:45 AM Sai Que Kelly MD [facility] BS AMB   10/18/2021  8:45 AM Luis Manuel Anderson PTA [facility]   10/22/2021  8:45 AM Shailesh Aguirre PT [facility]

## 2021-10-06 ENCOUNTER — OFFICE VISIT (OUTPATIENT)
Dept: FAMILY MEDICINE CLINIC | Age: 61
End: 2021-10-06
Payer: COMMERCIAL

## 2021-10-06 VITALS
TEMPERATURE: 98.4 F | WEIGHT: 158.4 LBS | HEART RATE: 80 BPM | BODY MASS INDEX: 24.81 KG/M2 | SYSTOLIC BLOOD PRESSURE: 128 MMHG | DIASTOLIC BLOOD PRESSURE: 76 MMHG | RESPIRATION RATE: 14 BRPM | OXYGEN SATURATION: 98 %

## 2021-10-06 DIAGNOSIS — Z12.11 COLON CANCER SCREENING: ICD-10-CM

## 2021-10-06 DIAGNOSIS — E55.9 VITAMIN D DEFICIENCY: ICD-10-CM

## 2021-10-06 DIAGNOSIS — G89.29 CHRONIC LEFT SHOULDER PAIN: ICD-10-CM

## 2021-10-06 DIAGNOSIS — Z00.00 PHYSICAL EXAM: Primary | ICD-10-CM

## 2021-10-06 DIAGNOSIS — M25.512 CHRONIC LEFT SHOULDER PAIN: ICD-10-CM

## 2021-10-06 PROCEDURE — 99396 PREV VISIT EST AGE 40-64: CPT | Performed by: FAMILY MEDICINE

## 2021-10-06 RX ORDER — ERGOCALCIFEROL 1.25 MG/1
50000 CAPSULE ORAL
Qty: 12 CAPSULE | Refills: 1 | Status: SHIPPED | OUTPATIENT
Start: 2021-10-06 | End: 2022-05-02 | Stop reason: SDUPTHER

## 2021-10-06 NOTE — PROGRESS NOTES
Patient here for well male exam. He is asking for refills on his Vitamin D today. 1. \"Have you been to the ER, urgent care clinic since your last visit? Hospitalized since your last visit? \" Had left shoulder surgery at [facility]    2. \"Have you seen or consulted any other health care providers outside of the [address] [place] [address] since your last visit? \" No     3. For patients aged 39-70: Has the patient had a colonoscopy?  No

## 2021-10-06 NOTE — PROGRESS NOTES
Marialuisa Britt. is a 64 y.o. male  presents for physical exam.  He has chronic left shoulder pain. No other sxs. No Known Allergies  Outpatient Medications Marked as Taking for the 10/6/21 encounter (Office Visit) with Di Moralez MD   Medication Sig Dispense Refill    methocarbamoL (ROBAXIN) 500 mg tablet TAKE 1 TABLET BY MOUTH TWO (2) TIMES DAILY AS NEEDED FOR MUSCLE SPASM(S). 40 Tablet 1    diclofenac EC (VOLTAREN) 50 mg EC tablet Take 1 Tablet by mouth two (2) times a day. 40 Tablet 1     Patient Active Problem List   Diagnosis Code    Advanced directives, counseling/discussion Z71.89    Elevated blood pressure [id number]    Pain of right hip joint M25.551    Chronic right shoulder pain M25.511, G89.29    Flank pain R10.9     Past Medical History:   Diagnosis Date    Hypertension      Social History     Socioeconomic History    Marital status:      Spouse name: Not on file    Number of children: Not on file    Years of education: Not on file    Highest education level: Not on file   Tobacco Use    Smoking status: Never Smoker    Smokeless tobacco: Never Used   Substance and Sexual Activity    Alcohol use: Yes     Alcohol/week: 8.0 standard drinks     Types: 8 Cans of beer per week     Comment: weekends    Drug use: No    Sexual activity: Yes     Social Determinants of Health     Financial Resource Strain:     Difficulty of Paying Living Expenses:    Food Insecurity:     Worried About Running Out of Food in the Last Year:     Ran Out of Food in the Last Year:    Transportation Needs:     Lack of Transportation (Medical):      Lack of Transportation (Non-Medical):    Physical Activity:     Days of Exercise per Week:     Minutes of Exercise per Session:    Stress:     Feeling of Stress :    Social Connections:     Frequency of Communication with Friends and Family:     Frequency of Social Gatherings with Friends and Family:     Attends Mu-ism Services:     Active Member of Clubs or Organizations:     Attends Club or Organization Meetings:     Marital Status:      History reviewed. No pertinent family history. Review of Systems   Constitutional: Negative for chills, fever, malaise/fatigue and weight loss. Eyes: Negative for blurred vision. Respiratory: Negative for cough, shortness of breath and wheezing. Cardiovascular: Negative for chest pain. Gastrointestinal: Negative for nausea and vomiting. Musculoskeletal: Positive for joint pain. Negative for myalgias. Skin: Negative for rash. Neurological: Negative for weakness. Vitals:    10/06/21 0934   BP: 128/76   Pulse: 80   Resp: 14   Temp: 98.4 °F (36.9 °C)   TempSrc: Oral   SpO2: 98%   Weight: 158 lb 6.4 oz (71.8 kg)   PainSc:   7   PainLoc: Shoulder       Physical Exam  Vitals and nursing note reviewed. Constitutional:       Appearance: Normal appearance. He is normal weight. HENT:      Right Ear: Tympanic membrane, ear canal and external ear normal.      Left Ear: Tympanic membrane, ear canal and external ear normal.      Nose: Nose normal.      Mouth/Throat:      Mouth: Mucous membranes are moist.      Pharynx: Oropharynx is clear. Eyes:      Extraocular Movements: Extraocular movements intact. Conjunctiva/sclera: Conjunctivae normal.      Pupils: Pupils are equal, round, and reactive to light. Neck:      Thyroid: No thyromegaly. Cardiovascular:      Rate and Rhythm: Normal rate and regular rhythm. Heart sounds: Normal heart sounds. Pulmonary:      Effort: Pulmonary effort is normal.      Breath sounds: Normal breath sounds. Abdominal:      General: Abdomen is flat. Bowel sounds are normal.      Palpations: Abdomen is soft. Musculoskeletal:         General: Tenderness present. Normal range of motion. Cervical back: Normal range of motion and neck supple. Skin:     General: Skin is warm and dry. Capillary Refill: Capillary refill takes less than 2 seconds. Neurological:      General: No focal deficit present. Mental Status: He is alert and oriented to person, place, and time. Psychiatric:         Mood and Affect: Mood normal.         Behavior: Behavior normal.         Thought Content: Thought content normal.         Judgment: Judgment normal.         Assessment/Plan      ICD-10-CM ICD-9-CM    1. Physical exam  Z00.00 V70.9    2. Vitamin D deficiency  E55.9 268.9 ergocalciferol (Vitamin D2) 1,250 mcg (50,000 unit) capsule   3. Colon cancer screening  Z12.11 V76.51 REFERRAL TO GASTROENTEROLOGY   4. Chronic left shoulder pain  M25.512 719.41     G89.29 338.29      I have discussed the diagnosis with the patient and the intended plan of care as seen in the above orders. The patient has received an after-visit summary and questions were answered concerning future plans. I have discussed medication, side effects, and warnings with the patient in detail. The patient verbalized understanding and is in agreement with the plan of care. The patient will contact the office with any additional concerns.     lab results and schedule of future lab studies reviewed with patient    Maikel Avalos MD

## 2021-10-06 NOTE — PATIENT INSTRUCTIONS
Well Visit, Men 48 to 72: Care Instructions  Overview     Well visits can help you stay healthy. Your doctor has checked your overall health and may have suggested ways to take good care of yourself. Your doctor also may have recommended tests. At home, you can help prevent illness with healthy eating, regular exercise, and other steps. Follow-up care is a key part of your treatment and safety. Be sure to make and go to all appointments, and call your doctor if you are having problems. It's also a good idea to know your test results and keep a list of the medicines you take. How can you care for yourself at home? · Get screening tests that you and your doctor decide on. Screening helps find diseases before any symptoms appear. · Eat healthy foods. Choose fruits, vegetables, whole grains, protein, and low-fat dairy foods. Limit fat, especially saturated fat. Reduce salt in your diet. · Limit alcohol. Have no more than 2 drinks a day or 14 drinks a week. · Get at least 30 minutes of exercise on most days of the week. Walking is a good choice. You also may want to do other activities, such as running, swimming, cycling, or playing tennis or team sports. · Reach and stay at a healthy weight. This will lower your risk for many problems, such as obesity, diabetes, heart disease, and high blood pressure. · Do not smoke. Smoking can make health problems worse. If you need help quitting, talk to your doctor about stop-smoking programs and medicines. These can increase your chances of quitting for good. · Care for your mental health. It is easy to get weighed down by worry and stress. Learn strategies to manage stress, like deep breathing and mindfulness, and stay connected with your family and community. If you find you often feel sad or hopeless, talk with your doctor. Treatment can help. · Talk to your doctor about whether you have any risk factors for sexually transmitted infections (STIs).  You can help prevent STIs if you wait to have sex with a new partner (or partners) until you've each been tested for STIs. It also helps if you use condoms (male or female condoms) and if you limit your sex partners to one person who only has sex with you. Vaccines are available for some STIs. · If it's important to you to prevent pregnancy with your partner, talk with your doctor about birth control options that might be best for you. · If you think you may have a problem with alcohol or drug use, talk to your doctor. This includes prescription medicines (such as amphetamines and opioids) and illegal drugs (such as cocaine and methamphetamine). Your doctor can help you figure out what type of treatment is best for you. · Protect your skin from too much sun. When you're outdoors from 10 a.m. to 4 p.m., stay in the shade or cover up with clothing and a hat with a wide brim. Wear sunglasses that block UV rays. Even when it's cloudy, put broad-spectrum sunscreen (SPF 30 or higher) on any exposed skin. · See a dentist one or two times a year for checkups and to have your teeth cleaned. · Wear a seat belt in the car. When should you call for help? Watch closely for changes in your health, and be sure to contact your doctor if you have any problems or symptoms that concern you. Where can you learn more? Go to http://www.gray.com/  Enter M939 in the search box to learn more about \"Well Visit, Men 48 to 72: Care Instructions. \"  Current as of: February 11, 2021               Content Version: 13.0  © 7775-1509 [employer]. Care instructions adapted under license by [employer] (which disclaims liability or warranty for this information). If you have questions about a medical condition or this instruction, always ask your healthcare professional. [address] any warranty or liability for your use of this information.

## 2021-10-08 ENCOUNTER — HOSPITAL ENCOUNTER (OUTPATIENT)
Dept: PHYSICAL THERAPY | Age: 61
End: 2021-10-08
Attending: ORTHOPAEDIC SURGERY
Payer: COMMERCIAL

## 2021-10-11 ENCOUNTER — HOSPITAL ENCOUNTER (OUTPATIENT)
Dept: PHYSICAL THERAPY | Age: 61
Discharge: HOME | End: 2021-10-11
Attending: ORTHOPAEDIC SURGERY
Payer: COMMERCIAL

## 2021-10-11 PROCEDURE — 97110 THERAPEUTIC EXERCISES: CPT

## 2021-10-11 PROCEDURE — 97140 MANUAL THERAPY 1/> REGIONS: CPT

## 2021-10-11 NOTE — PROGRESS NOTES
PT DAILY TREATMENT NOTE     Patient Name: Roman Blevins.   Date:10/11/2021  : 1960  [x]  Patient  Verified  Payor: BLUE CR[place] / Plan: [employer]ame[employer] [id number] PPO / Product Type: PPO /    In time:8:50  Out time:9:40  Total Treatment Time (min): 50  Visit #: 6 of 10    Medicare/BCBS Only   Total Timed Codes (min):  40 1:1 Treatment Time:  40       Treatment Area: Pain in left shoulder [M25.512]  Strain of muscle(s) and tendon(s) of the rotator cuff of left shoulder, subsequent encounter [S46.491D]    SUBJECTIVE  Pain Level (0-10 scale): 7  Any medication changes, allergies to medications, adverse drug reactions, diagnosis change, or new procedure performed?: [x] No    [] Yes (see summary sheet for update)  Subjective functional status/changes:   [] No changes reported  Pt states he is supposed to see MD on Friday, feels like he should be able to move his arm more by now    OBJECTIVE    Modality rationale: decrease pain to improve the patients ability to perform daily tasks   Min Type Additional Details    [] Estim:  []Unatt       []IFC  []Premod                        []Other:  []w/ice   []w/heat  Position:  Location:    [] Estim: []Att    []TENS instruct  []NMES                    []Other:  []w/US   []w/ice   []w/heat  Position:  Location:    []  Traction: [] Cervical       []Lumbar                       [] Prone          []Supine                       []Intermittent   []Continuous Lbs:  [] before manual  [] after manual    []  Ultrasound: []Continuous   [] Pulsed                           []1MHz   []3MHz W/cm2:  Location:    []  Iontophoresis with dexamethasone         Location: [] Take home patch   [] In clinic   10 [x]  Ice     []  heat  []  Ice massage  []  Laser   []  Anodyne Position: supine  Location: left shoulder    []  Laser with stim  []  Other:  Position:  Location:    []  Vasopneumatic Device    []  Right     []  Left  Pre-treatment girth:  Post-treatment girth:  Measured at (location):  Pressure:       [] lo [] med [] hi   Temperature: [] lo [] med [] hi   [] Skin assessment post-treatment:  []intact []redness- no adverse reaction    []redness - adverse reaction:     25 min Therapeutic Exercise:  [x] See flow sheet :   Rationale: increase ROM and increase strength to improve the patients ability to perform ADLs    15 min Manual Therapy:  In S/L scap mobs, DTM to parascap mm. In Supine gentle [address] distraction, post/inf [address] jt mobs with PROM   The manual therapy interventions were performed at a separate and distinct time from the therapeutic activities interventions. Rationale: decrease pain, increase ROM and increase tissue extensibility to improve functional mobility            With   [] TE   [] TA   [] neuro   [] other: Patient Education: [x] Review HEP    [] Progressed/Changed HEP based on:   [] positioning   [] body mechanics   [] transfers   [] heat/ice application    [] other:      Other Objective/Functional Measures:      Pain Level (0-10 scale) post treatment: 0    ASSESSMENT/Changes in Function: Cont's to be greatly limited with shoulder PROM. Flex improved with stretching. Significant shoulder hike with all therex. Decr'd pain following treatment. Patient will continue to benefit from skilled PT services to modify and progress therapeutic interventions, address functional mobility deficits, address ROM deficits, address strength deficits, analyze and address soft tissue restrictions, analyze and cue movement patterns, analyze and modify body mechanics/ergonomics and assess and modify postural abnormalities to attain remaining goals.      []  See Plan of Care  []  See progress note/recertification  []  See Discharge Summary         Progress towards goals / Updated goals:  Short Term Goals: To be accomplished in 5 treatments:  1.  Pt will be compliant and independent with HEP in order to facilitate PT sessions and aid with self management             Eval Status:  Initiated             Current Status: Minor compliance with HEP 9/24/21  2.  Pt to tolerate 30 min or more of TE and/or Interventions w/o increased s/s             Eval Status:  Initiated             Current Status: significant increase in pain after treatment 9/29/21     Long Term Goals: To be accomplished in 10 treatments:  1.  Pt will report 50% improvement or better with left shoulder dysfunction to show a significant increase in ability to tolerate ADL             Eval Status:  Initiated             Current Status: 8% improvement reported (10/5/21)  2.  Pt will demonstrate PROM left shoulder Flx/AB to 90* with pain 3/10 or better for progress to performing AAROM with daily exercises              Eval Status:  Very limited PROM ~40*             Current Status: Improved ABD/Flx to 60* after treatment 9/29/21  3.  Pt will initiate AROM to shoulder level for carryover to improving left shoulder function for progress to normal elevation             Eval Status:  Limited AROM at 10-12* Flx/ABD             Current Status:  4.  Pt will perform cone stack to shoulder level for progress to reaching overhead to perform usual work tasks               Eval Status:  Initiated             Current Status:  5.  Pt will improve FOTO score to 70 in 22 visits to show significant improvement in function for progress to good shoulder function with daily activity             Eval Status: FOTO = 38             Current Status:        PLAN  []  Upgrade activities as tolerated     [x]  Continue plan of care  []  Update interventions per flow sheet       []  Discharge due to:_  []  Other:_      Reese Jaramillo PTA 10/11/2021  8:50 AM    Future Appointments   Date Time Provider Billie Turcios   10/14/2021 10:15 AM Ashlyn Null [facility]   10/15/2021  8:45 AM Eduardo Gurrola MD [facility]   10/18/2021  8:45 AM Lucita George PTA [facility]   10/22/2021  8:45 AM Jami Churchill PT [facility]

## 2021-10-14 ENCOUNTER — HOSPITAL ENCOUNTER (OUTPATIENT)
Dept: PHYSICAL THERAPY | Age: 61
Discharge: HOME | End: 2021-10-14
Attending: ORTHOPAEDIC SURGERY
Payer: COMMERCIAL

## 2021-10-14 PROCEDURE — 97110 THERAPEUTIC EXERCISES: CPT

## 2021-10-14 PROCEDURE — 97140 MANUAL THERAPY 1/> REGIONS: CPT

## 2021-10-14 PROCEDURE — 97032 APPL MODALITY 1+ESTIM EA 15: CPT

## 2021-10-14 NOTE — PROGRESS NOTES
[facility] at [address]  [address]  Phone: [phone]      Fax:  [phone]    Progress Note  Patient name: Julianna Perez. Start of Care: 2021   Referral source: Alma Bui MD : 1960               Medical Diagnosis: Pain in left shoulder [M25.512]  Strain of muscle(s) and tendon(s) of the rotator cuff of left shoulder, subsequent encounter [S46.012D]  Payor: [place]UE [place] / Plan: [facility] PPO / Product Type: PPO /  Onset Date:21               Treatment Diagnosis: Left ShoulderPain   Prior Hospitalization: see medical history Provider#: [id number]   Medications: Verified on Patient summary List   Comorbidities: OA  Prior Level of Function: Able to use left arm for work and usual activity  Visits from Start of Care: 7    Missed Visits: 1      Key Functional Changes:    Patient has consistently presented with severe mm guarding and inability to perform AROM of left shoulder beyond 30-35 degs of flexion and ABD. Utilized modalities to decrease mm tone restricting scapular mobility and heat anterior capsule during gentle PROM with good results. PROM was 70 degs flx/ABD post treatment today and patient demonstrated overall improvement in relaxed posture and reported decreased pain level. Patient will continue to benefit from skilled PT services to modify and progress therapeutic interventions, address functional mobility deficits, address ROM deficits, address strength deficits, analyze and address soft tissue restrictions and analyze and cue movement patterns to attain remaining goals.          Progress towards goals / Updated goals:  Short Term Goals: To be accomplished in 5 treatments:  1.  Pt will be compliant and independent with HEP in order to facilitate PT sessions and aid with self management             Eval Status:  Initiated             Current Status: reported compliance 10/14/21  2.  Pt to tolerate 30 min or more of TE and/or Interventions w/o increased s/s             Eval Status:  Initiated             Current Status: continues to have increase in pain after treatment 10/14/21     Long Term Goals: To be accomplished in 10 treatments:  1.  Pt will report 50% improvement or better with left shoulder dysfunction to show a significant increase in ability to tolerate ADL             Eval Status:  Initiated             Current Status: slight progression, 10% improvement reported (10/14/21)  2.  Pt will demonstrate PROM left shoulder Flx/AB to 90* with pain 3/10 or better for progress to performing AAROM with daily exercises              Eval Status:  Very limited PROM ~40*             Current Status: progressing, post manual ABD/Flx 70 degs  (10/14/21)  3.  Pt will initiate AROM to shoulder level for carryover to improving left shoulder function for progress to normal elevation             Eval Status:  Limited AROM at 10-12* Flx/ABD             Current Status: slight progression, continues to be AAROM during therex with some but minimal improvement in AROM ABD 30 degs Flx 35 degs  (10/14/21)  4.  Pt will perform cone stack to shoulder level for progress to reaching overhead to perform usual work tasks               Eval Status:  Initiated             Current Status:Not met, unable to perform at this time  (10/14/21)  5.  Pt will improve FOTO score to 70 in 22 visits to show significant improvement in function for progress to good shoulder function with daily activity             Eval Status: FOTO = 38             Current Status: slight progression, FOTO 40  (10/14/21)     Updated Goals: to be achieved in 10 treatments:   Short Term Goals: To be accomplished in 5 treatments:  1.  Pt will be compliant and independent with HEP in order to facilitate PT sessions and aid with self management             Eval Status:  Initiated             Current Status: reported compliance 10/14/21  2.  Pt to tolerate 30 min or more of TE and/or Interventions w/o increased s/s             Eval Status:  Initiated             Current Status: continues to have increase in pain after treatment 10/14/21     Long Term Goals: To be accomplished in 10 treatments:  1.  Pt will report 50% improvement or better with left shoulder dysfunction to show a significant increase in ability to tolerate ADL            Current Status: slight progression, 10% improvement reported (10/14/21)  2.  Pt will demonstrate PROM left shoulder Flx/AB to 90* with pain 3/10 or better for progress to performing AAROM with daily exercises             Current Status: progressing, post manual ABD/Flx 70 degs  (10/14/21)  3.  Pt will initiate AROM to shoulder level for carryover to improving left shoulder function for progress to normal elevation            Current Status: slight progression, continues to be AAROM during therex with some but minimal improvement in AROM ABD 30 degs Flx 35 degs  (10/14/21)  4.  Pt will perform cone stack to shoulder level for progress to reaching overhead to perform usual work tasks              Current Status:Not met, unable to perform at this time  (10/14/21)  5.  Pt will improve FOTO score to 70 in 22 visits to show significant improvement in function for progress to good shoulder function with daily activity            Current Status: slight progression, FOTO 40  (10/14/21)  ASSESSMENT/RECOMMENDATIONS:  [x]Continue therapy per initial plan/protocol at a frequency of  2 x per week for 10 visits  []Continue therapy with the following recommended changes:_____________________      _____________________________________________________________________  []Discontinue therapy progressing towards or have reached established goals  []Discontinue therapy due to lack of appreciable progress towards goals  []Discontinue therapy due to lack of attendance or compliance  []Await Physician's recommendations/decisions regarding therapy  []Other:________________________________________________________________    Thank you for this referral.   David Holder, PTA 10/14/2021 12:24 PM  LAL[facility] Reed  NOTE TO PHYSICIAN:  PLEASE COMPLETE THE ORDERS BELOW AND   FAX TO [facility]: [phone]  If you are unable to process this request in 24 hours please contact our office:   [phone]  []  I have read the above report and request that my patient continue as recommended. []  I have read the above report and request that my patient continue therapy with the following changes/special instructions:________________________________________  []I have read the above report and request that my patient be discharged from therapy.     Physician's Signature:____________Date:_________TIME:________     Eduardo Gurrola MD  ** Signature, Date and Time must be completed for valid certification **

## 2021-10-14 NOTE — PROGRESS NOTES
PT DAILY TREATMENT NOTE     Patient Name: Rosa Edwards.   Date:10/14/2021  : 1960  [x]  Patient  Verified  Payor: BLUE CROSS / Plan: Karan Hameed [id number] PPO / Product Type: PPO /    In time:1015  Out time:1110  Total Treatment Time (min): 55  Visit #: 7 of 10    Medicare/BCBS Only   Total Timed Codes (min):  45 1:1 Treatment Time:  45       Treatment Area: Pain in left shoulder [M25.512]  Strain of muscle(s) and tendon(s) of the rotator cuff of left shoulder, subsequent encounter [S46.577D]    SUBJECTIVE  Pain Level (0-10 scale): 8  Any medication changes, allergies to medications, adverse drug reactions, diagnosis change, or new procedure performed?: [x] No    [] Yes (see summary sheet for update)  Subjective functional status/changes:   [] No changes reported  Patient reported increased \"tightness\" and pain after restless sleep last night    OBJECTIVE    Modality rationale: decrease inflammation, decrease pain and increase tissue extensibility to improve the patients ability to perform ADLs   Min Type Additional Details    [] Estim:  []Unatt       []IFC  []Premod                        []Other:  []w/ice   []w/heat  Position:  Location:   8 [x] Estim: [x]Att    []TENS instruct  []NMES                    [x]Other: combo [x]w/US   []w/ice   []w/heat  Position:right S/L  Location: medial scapular boarder and UT on Left    []  Traction: [] Cervical       []Lumbar                       [] Prone          []Supine                       []Intermittent   []Continuous Lbs:  [] before manual  [] after manual   8 [x]  Ultrasound: [x]Continuous   [] Pulsed                           []1MHz   [x]3MHz W/cm2:1.2  Location: anterior capsule left shoulder     []  Iontophoresis with dexamethasone         Location: [] Take home patch   [] In clinic   10 [x]  Ice     []  heat  []  Ice massage  []  Laser   []  Anodyne Position:seated  Location: left shoulder    []  Laser with stim  []  Other: Position:  Location:    []  Vasopneumatic Device    []  Right     []  Left  Pre-treatment girth:  Post-treatment girth:  Measured at (location):  Pressure:       [] lo [] med [] hi   Temperature: [] lo [] med [] hi   [] Skin assessment post-treatment:  []intact []redness- no adverse reaction    []redness - adverse reaction:       19 min Therapeutic Exercise:  [x] See flow sheet :   Rationale: increase ROM and increase strength to improve the patients ability to perform ADLs      10 min Manual Therapy:  ST mobs, TPR left UT/LS and along medial boarder of scapula. PROM and manual stretching into left shoulder flexion and ABD. Posterior and inferior GHJ mobs   The manual therapy interventions were performed at a separate and distinct time from the therapeutic activities interventions. Rationale: decrease pain, increase ROM and increase tissue extensibility to perform ADLs         With   [] TE   [] TA   [] neuro   [] other: Patient Education: [x] Review HEP    [] Progressed/Changed HEP based on:   [] positioning   [] body mechanics   [] transfers   [] heat/ice application    [] other:      Other Objective/Functional Measures: see goals below     Pain Level (0-10 scale) post treatment: 5    ASSESSMENT/Changes in Function: Patient has consistently presented with severe mm guarding and inability to perform AROM of left shoulder beyond 30-35 degs of flexion and ABD. Utilized modalities to decrease mm tone restricting scapular mobility and heat anterior capsule during gentle PROM with good results. PROM was 70 degs flx/ABD post treatment today and patient demonstrated overall improvement in relaxed posture and reported decreased pain level.   Patient will continue to benefit from skilled PT services to modify and progress therapeutic interventions, address functional mobility deficits, address ROM deficits, address strength deficits, analyze and address soft tissue restrictions and analyze and cue movement patterns to attain remaining goals.      []  See Plan of Care  []  See progress note/recertification  []  See Discharge Summary         Progress towards goals / Updated goals:  Short Term Goals: To be accomplished in 5 treatments:  1.  Pt will be compliant and independent with HEP in order to facilitate PT sessions and aid with self management             Eval Status:  Initiated             Current Status: reported compliance 10/14/21  2.  Pt to tolerate 30 min or more of TE and/or Interventions w/o increased s/s             Eval Status:  Initiated             Current Status: continues to have increase in pain after treatment 10/14/21     Long Term Goals: To be accomplished in 10 treatments:  1.  Pt will report 50% improvement or better with left shoulder dysfunction to show a significant increase in ability to tolerate ADL             Eval Status:  Initiated             Current Status: slight progression, 10% improvement reported (10/14/21)  2.  Pt will demonstrate PROM left shoulder Flx/AB to 90* with pain 3/10 or better for progress to performing AAROM with daily exercises              Eval Status:  Very limited PROM ~40*             Current Status: progressing, post manual ABD/Flx 70 degs  (10/14/21)  3.  Pt will initiate AROM to shoulder level for carryover to improving left shoulder function for progress to normal elevation             Eval Status:  Limited AROM at 10-12* Flx/ABD             Current Status: slight progression, continues to be AAROM during therex with some but minimal improvement in AROM ABD 30 degs Flx 35 degs  (10/14/21)  4.  Pt will perform cone stack to shoulder level for progress to reaching overhead to perform usual work tasks               Eval Status:  Initiated             Current Status:Not met, unable to perform at this time  (10/14/21)  5.  Pt will improve FOTO score to 70 in 22 visits to show significant improvement in function for progress to good shoulder function with daily activity             Eval Status: FOTO = 38             Current Status: slight progression, FOTO 40  (10/14/21)    PLAN  []  Upgrade activities as tolerated     [x]  Continue plan of care  []  Update interventions per flow sheet       []  Discharge due to:_  []  Other:_      Roberto Marshall PTA 10/14/2021  11:04 AM    Future Appointments   Date Time Provider Billie Turcios   10/15/2021  8:45 AM [facility]   10/18/2021  8:45 AM Rox Stanley PTA [facility]   10/22/2021  8:45 AM Jolene Starks PT [facility]

## 2021-10-15 ENCOUNTER — OFFICE VISIT (OUTPATIENT)
Dept: ORTHOPEDIC SURGERY | Age: 61
End: 2021-10-15
Payer: COMMERCIAL

## 2021-10-15 VITALS
OXYGEN SATURATION: 97 % | HEIGHT: 67 IN | BODY MASS INDEX: 24.8 KG/M2 | WEIGHT: 158 LBS | TEMPERATURE: 97.5 F | HEART RATE: 81 BPM

## 2021-10-15 DIAGNOSIS — S46.012D TRAUMATIC COMPLETE TEAR OF LEFT ROTATOR CUFF, SUBSEQUENT ENCOUNTER: Primary | ICD-10-CM

## 2021-10-15 PROCEDURE — 99024 POSTOP FOLLOW-UP VISIT: CPT | Performed by: ORTHOPAEDIC SURGERY

## 2021-10-15 NOTE — LETTER
NOTIFICATION RETURN TO WORK / SCHOOL    10/15/2021 8:58 AM    Mr. Jovana Mercer. [address] [place] [id number]      To Whom It May Concern:    Jovana Royal is currently under the care of [address] [place], [place] [address]juan alberto Grecovard. No work at this time prior to 12/1/2021 due to left shoulder surgery and recovery. If there are questions or concerns please have the patient contact our office.         Sincerely,      Pedro Draper MD

## 2021-10-15 NOTE — PROGRESS NOTES
Patient: Lake Beauchamp. MRN: [id number]       SSN: [id number]  YOB: 1960        AGE: 64 y.o. SEX: male  Body mass index is 24.75 kg/m². PCP: Iban Arana MD  10/15/21    Chief Complaint: Left shoulder follow up    Pain 7/10    HPI: Lake Ortega is a 64 y.o. male patient who returns to the office today for his left shoulder. He is now 2 months out from his left shoulder massive rotator cuff repair. He is stiff. He continues to have some pain in his left shoulder especially with motion. He is in physical therapy. Past Medical History:   Diagnosis Date    Hypertension        No family history on file. Current Outpatient Medications   Medication Sig Dispense Refill    ergocalciferol (Vitamin D2) 1,250 mcg (50,000 unit) capsule Take 1 Capsule by mouth every seven (7) days. 12 Capsule 1    methocarbamoL (ROBAXIN) 500 mg tablet TAKE 1 TABLET BY MOUTH TWO (2) TIMES DAILY AS NEEDED FOR MUSCLE SPASM(S). 40 Tablet 1    diclofenac EC (VOLTAREN) 50 mg EC tablet Take 1 Tablet by mouth two (2) times a day. 40 Tablet 1    amLODIPine (NORVASC) 5 mg tablet Take 1 Tab by mouth daily. (Patient not taking: Reported on 6/9/2021) 30 Tab 5       No Known Allergies    Past Surgical History:   Procedure Laterality Date    OK ANESTH,SURGERY OF SHOULDER Left 08/12/2021    left shoulder A/S RCR BTS    OK COLONOSCOPY W/BIOPSY SINGLE/MULTIPLE  1-8-16    Dr. Flores Buhl       Social History     Socioeconomic History    Marital status:      Spouse name: Not on file    Number of children: Not on file    Years of education: Not on file    Highest education level: Not on file   Occupational History    Not on file   Tobacco Use    Smoking status: Never Smoker    Smokeless tobacco: Never Used   Substance and Sexual Activity    Alcohol use:  Yes     Alcohol/week: 8.0 standard drinks     Types: 8 Cans of beer per week     Comment: weekends    Drug use: No    Sexual activity: Yes   Other Topics Concern    Not on file   Social History Narrative    Not on file     Social Determinants of Health     Financial Resource Strain:     Difficulty of Paying Living Expenses:    Food Insecurity:     Worried About Running Out of Food in the Last Year:     [address] in the Last Year:    Transportation Needs:     Lack of Transportation (Medical):  Lack of Transportation (Non-Medical):    Physical Activity:     Days of Exercise per Week:     Minutes of Exercise per Session:    Stress:     Feeling of Stress :    Social Connections:     Frequency of Communication with Friends and Family:     Frequency of Social Gatherings with Friends and Family:     Attends [employer] Services:     Active Member of Clubs or Organizations:     Attends Club or Organization Meetings:     Marital Status:    Intimate Partner Violence:     Fear of Current or Ex-Partner:     Emotionally Abused:     Physically Abused:     Sexually Abused:        REVIEW OF SYSTEMS:      No changes from previous review of systems unless noted. PHYSICAL EXAMINATION:  Visit Vitals  Pulse 81   Temp 97.5 °F (36.4 °C) (Temporal)   Ht 5' 7\" (1.702 m)   Wt 158 lb (71.7 kg)   SpO2 97%   BMI 24.75 kg/m²     Body mass index is 24.75 kg/m². GENERAL: Alert and oriented x3, in no acute distress. HEENT: Normocephalic, atraumatic. RESP: Non labored breathing. SKIN: No rashes or lesions noted. Left shoulder is extremely stiff with external rotation to neutral and forward flexion of approximately 30 degrees. IMAGING:  No imaging today    ASSESSMENT & PLAN  Diagnosis: Status post left shoulder massive arthroscopic rotator cuff repair and biceps tenodesis 2 months    Joy Tyler unfortunately has stiffness after his massive rotator cuff repair which I think is likely causing his pain.   At this point I recommended continuing to work on range of motion exercises specifically forward flexion and external rotation and continue physical therapy. I will see him back in a month. If he continues to have this much stiffness in a month we did discuss the possibility of an additional procedure. I have kept him out of work until at least December 1 at this time. I stressed with him that the size of his rotator cuff tear the fact that he is stiff and has having pain 2 months out from the surgery is not abnormal.      Electronically signed by: Lisa Edgar MD    Note: This note was completed using voice recognition software.   Any typographical/name errors or mistakes are unintentional.

## 2021-10-18 ENCOUNTER — HOSPITAL ENCOUNTER (OUTPATIENT)
Dept: PHYSICAL THERAPY | Age: 61
Discharge: HOME | End: 2021-10-18
Attending: ORTHOPAEDIC SURGERY
Payer: COMMERCIAL

## 2021-10-18 PROCEDURE — 97140 MANUAL THERAPY 1/> REGIONS: CPT

## 2021-10-18 PROCEDURE — 97035 APP MDLTY 1+ULTRASOUND EA 15: CPT

## 2021-10-18 PROCEDURE — 97110 THERAPEUTIC EXERCISES: CPT

## 2021-10-18 NOTE — PROGRESS NOTES
PT DAILY TREATMENT NOTE     Patient Name: Lisseth Rosen. Date:10/18/2021  : 1960  [x]  Patient  Verified  Payor: BLUE CROSS / Plan: Karan Hameed [id number] PPO / Product Type: PPO /    In time:845  Out time:935  Total Treatment Time (min): 50  Visit #: 1 of 10    Medicare/BCBS Only   Total Timed Codes (min):  50 1:1 Treatment Time:  40       Treatment Area: Pain in left shoulder [M25.512]  Strain of muscle(s) and tendon(s) of the rotator cuff of left shoulder, subsequent encounter [S46.047D]    SUBJECTIVE  Pain Level (0-10 scale): 5  Any medication changes, allergies to medications, adverse drug reactions, diagnosis change, or new procedure performed?: [x] No    [] Yes (see summary sheet for update)  Subjective functional status/changes:   [] No changes reported  Patient reported MD said he is very stiff because of how extensive his surgery was but that there is no issues with the repair right now. MD note states to continue with PT focusing on increasing flexion and ER at this time.     OBJECTIVE    Modality rationale: decrease inflammation, decrease pain and increase tissue extensibility to improve the patients ability to perform ADLs   Min Type Additional Details   10 [x] Estim:  [x]Unatt       [x]IFC  []Premod                        []Other:  [x]w/ice   []w/heat  Position:seated   Location: left shoulder    [] Estim: []Att    []TENS instruct  []NMES                    []Other:  []w/US   []w/ice   []w/heat  Position:  Location:    []  Traction: [] Cervical       []Lumbar                       [] Prone          []Supine                       []Intermittent   []Continuous Lbs:  [] before manual  [] after manual   8 [x]  Ultrasound: [x]Continuous   [] Pulsed                           [x]1MHz   []3MHz W/cm2:1.5  Location: anterior capsule left shoulder while stretching into ER    []  Iontophoresis with dexamethasone         Location: [] Take home patch   [] In clinic    []  Ice     [] heat  []  Ice massage  []  Laser   []  Anodyne Position:  Location:    []  Laser with stim  []  Other:  Position:  Location:    []  Vasopneumatic Device    []  Right     []  Left  Pre-treatment girth:  Post-treatment girth:  Measured at (location):  Pressure:       [] lo [] med [] hi   Temperature: [] lo [] med [] hi   [] Skin assessment post-treatment:  []intact []redness- no adverse reaction    []redness - adverse reaction:         12 min Therapeutic Exercise:  [] See flow sheet :   Rationale: increase ROM and increase strength to improve the patients ability to perform ADLs      10 min Manual Therapy:  Left shoulder posterior and inferior GHJ mobs, gentle left UE distraction and stretching with over pressure into flexion, scaption and ER    The manual therapy interventions were performed at a separate and distinct time from the therapeutic activities interventions. Rationale: decrease pain, increase ROM and increase tissue extensibility to perform ADLs            With   [] TE   [] TA   [] neuro   [] other: Patient Education: [x] Review HEP    [] Progressed/Changed HEP based on:   [] positioning   [] body mechanics   [] transfers   [] heat/ice application    [] other:      Other Objective/Functional Measures:   Post manual: PROM left shoulder flexion 82 degs, ER neutral with shoulder netral as side     Pain Level (0-10 scale) post treatment: 5    ASSESSMENT/Changes in Function: patient made significant gains in PROM of left shoulder today improving form approximately 30 degs to 82 degs post manual.    Patient will continue to benefit from skilled PT services to modify and progress therapeutic interventions, address functional mobility deficits, address ROM deficits, address strength deficits, analyze and address soft tissue restrictions and analyze and cue movement patterns to attain remaining goals.      [x]  See Plan of Care  []  See progress note/recertification  []  See Discharge Summary         Progress towards goals / Updated goals:  Updated Goals: to be achieved in 10 treatments:             Short Term Goals: To be accomplished in 5 treatments:  1.  Pt will be compliant and independent with HEP in order to facilitate PT sessions and aid with self management             Eval Status:  Initiated             Current Status: reported compliance 10/14/21  2.  Pt to tolerate 30 min or more of TE and/or Interventions w/o increased s/s             Eval Status:  Initiated             Current Status: continues to have increase in pain after treatment 10/14/21     Long Term Goals: To be accomplished in 10 treatments:  1.  Pt will report 50% improvement or better with left shoulder dysfunction to show a significant increase in ability to tolerate ADL            Current Status: slight progression, 10% improvement reported (10/14/21)  2.  Pt will demonstrate PROM left shoulder Flx/AB to 90* with pain 3/10 or better for progress to performing AAROM with daily exercises             Current Status: progressing, post manual ABD/Flx 82 degs  (10/18/21)  3.  Pt will initiate AROM to shoulder level for carryover to improving left shoulder function for progress to normal elevation            Current Status: slight progression, continues to be AAROM during therex with some but minimal improvement in AROM ABD 30 degs Flx 35 degs  (10/14/21)  4.  Pt will perform cone stack to shoulder level for progress to reaching overhead to perform usual work tasks              Current Status:Not met, unable to perform at this time  (10/14/21)  5.  Pt will improve FOTO score to 70 in 22 visits to show significant improvement in function for progress to good shoulder function with daily activity            Current Status: slight progression, FOTO 40  (10/14/21)    PLAN  [x]  Upgrade activities as tolerated     [x]  Continue plan of care  []  Update interventions per flow sheet       []  Discharge due to:_  []  Other:_      Edwige Brown, PTA 10/18/2021  8:51 AM    Future Appointments   Date Time Provider Billie Turcios   10/22/2021  8:45 AM Manule Wynn, PT [facility]   12/3/2021  8:15 AM Kb Gibbs MD [facility]

## 2021-10-19 ENCOUNTER — DOCUMENTATION ONLY (OUTPATIENT)
Dept: ORTHOPEDIC SURGERY | Age: 61
End: 2021-10-19

## 2021-10-22 ENCOUNTER — HOSPITAL ENCOUNTER (OUTPATIENT)
Dept: PHYSICAL THERAPY | Age: 61
Discharge: HOME | End: 2021-10-22
Attending: ORTHOPAEDIC SURGERY
Payer: COMMERCIAL

## 2021-10-22 PROCEDURE — 97035 APP MDLTY 1+ULTRASOUND EA 15: CPT

## 2021-10-22 PROCEDURE — 97530 THERAPEUTIC ACTIVITIES: CPT

## 2021-10-22 PROCEDURE — 97110 THERAPEUTIC EXERCISES: CPT

## 2021-10-22 PROCEDURE — 97140 MANUAL THERAPY 1/> REGIONS: CPT

## 2021-10-22 NOTE — PROGRESS NOTES
PT DAILY TREATMENT NOTE     Patient Name: Anjum Kinney.   Date:10/22/2021  : 1960  [x]  Patient  Verified  Payor: BLUE CROSS / Plan: Karan Hameed [id number] PPO / Product Type: PPO /    In time:920  Out time:102  Total Treatment Time (min): 62  Visit #: 2 of 10    Medicare/BCBS Only   Total Timed Codes (min):  58 1:1 Treatment Time:  62       Treatment Area: Pain in left shoulder [M25.512]  Strain of muscle(s) and tendon(s) of the rotator cuff of left shoulder, subsequent encounter [S46.012D]    SUBJECTIVE  Pain Level (0-10 scale): 0  Any medication changes, allergies to medications, adverse drug reactions, diagnosis change, or new procedure performed?: [x] No    [] Yes (see summary sheet for update)  Subjective functional status/changes:   [] No changes reported  Pain at night and early in the morning    OBJECTIVE    Modality rationale: decrease inflammation, decrease pain and increase tissue extensibility to improve the patients ability to improve left UE mobility   Min Type Additional Details    [] Estim:  []Unatt       []IFC  []Premod                        []Other:  []w/ice   []w/heat  Position:  Location:    [] Estim: []Att    []TENS instruct  []NMES                    []Other:  []w/US   []w/ice   []w/heat  Position:  Location:    []  Traction: [] Cervical       []Lumbar                       [] Prone          []Supine                       []Intermittent   []Continuous Lbs:  [] before manual  [] after manual   15 [x]  Ultrasound: [x]Continuous   [] Pulsed                           [x]1MHz   [x]3MHz W/cm2: 1.7 , 1.3  Location: Left Infraspinatus, Left [address] Joint    []  Iontophoresis with dexamethasone         Location: [] Take home patch   [] In clinic    []  Ice     []  heat  []  Ice massage  []  Laser   []  Anodyne Position:  Location:    []  Laser with stim  []  Other:  Position:  Location:    []  Vasopneumatic Device    []  Right     []  Left  Pre-treatment girth:  Post-treatment girth: Measured at (location):  Pressure:       [] lo [] med [] hi   Temperature: [] lo [] med [] hi   [x] Skin assessment post-treatment:  [x]intact []redness- no adverse reaction    []redness - adverse reaction:     26 min Therapeutic Exercise:  [] See flow sheet :   Rationale: increase ROM, increase strength and improve coordination to improve the patients ability to improve functional activity    5 min Therapeutic Activity:  [x]  See flow sheet :   Rationale: increase ROM, increase strength and improve coordination  to improve the patients ability to increase use of the left shoulder       12 min Manual Therapy:  STM/DTM/TPR left infraspinatus and Teres minor   The manual therapy interventions were performed at a separate and distinct time from the therapeutic activities interventions. Rationale: decrease pain, increase ROM, increase tissue extensibility and decrease trigger points to improve daily function          With   [x] TE   [x] TA   [] neuro   [] other: Patient Education: [x] Review HEP    [] Progressed/Changed HEP based on:   [] positioning   [] body mechanics   [] transfers   [] heat/ice application    [] other:      Other Objective/Functional Measures:   - TTP right infraspinatus  - Shoulder Flx 40* ABD 30*       Pain Level (0-10 scale) post treatment: 3    ASSESSMENT/Changes in Function:   - AROM left shoulder Flx 40 ABD 35 after STM left infraspinatus    Patient will continue to benefit from skilled PT services to modify and progress therapeutic interventions, address functional mobility deficits, address ROM deficits, address strength deficits, analyze and address soft tissue restrictions and analyze and cue movement patterns to attain remaining goals.      []  See Plan of Care  []  See progress note/recertification  []  See Discharge Summary         Progress towards goals / Updated goals:  Updated Goals: to be achieved in 10 treatments:             Short Term Goals: To be accomplished in 5 treatments:  1.  Pt will be compliant and independent with HEP in order to facilitate PT sessions and aid with self management             Eval Status:  Initiated             Current Status: reported compliance 10/14/21  2.  Pt to tolerate 30 min or more of TE and/or Interventions w/o increased s/s             Eval Status:  Initiated             Current Status: continues to have increase in pain after treatment 10/14/21     Long Term Goals: To be accomplished in 10 treatments:  1.  Pt will report 50% improvement or better with left shoulder dysfunction to show a significant increase in ability to tolerate ADL            Current Status: slight progression, 10% improvement reported (10/14/21)  2.  Pt will demonstrate PROM left shoulder Flx/AB to 90* with pain 3/10 or better for progress to performing AAROM with daily exercises             Current Status: progressing, post manual ABD/Flx 82 degs  (10/18/21)  3.  Pt will initiate AROM to shoulder level for carryover to improving left shoulder function for progress to normal elevation            Current Status: slight progression, continues to be AAROM during therex with some but minimal improvement in AROM ABD 30 degs Flx 40 degs  (10/22/21)  4.  Pt will perform cone stack to shoulder level for progress to reaching overhead to perform usual work tasks              Current Status:Not met, unable to perform at this time  (10/14/21)  5.  Pt will improve FOTO score to 70 in 22 visits to show significant improvement in function for progress to good shoulder function with daily activity            Current Status: slight progression, FOTO 40  (10/14/21)    PLAN  [x]  Upgrade activities as tolerated     [x]  Continue plan of care  []  Update interventions per flow sheet       []  Discharge due to:_  []  Other:_      Susan Norton, PT 10/22/2021  8:12 AM    Future Appointments   Date Time Provider Billie Turcios   10/22/2021  8:45 AM Evgeny Montes, PT [place] [facility] 10/29/2021  9:30 AM Mich Chadd, PTA [facility]   11/1/2021  9:30 AM Mich Chadd, PTA [facility]   11/5/2021  9:30 AM Mich Chadd, PTA [facility]   11/8/2021  9:30 AM Mich Chadd, PTA [facility]   11/12/2021  9:30 AM Gerlean Care, PT [facility]   11/15/2021  9:30 AM Mich Chadd, PTA [facility]   11/19/2021  9:30 AM Glen Kung [facility]   11/22/2021  9:30 AM Glen Kung [facility]   11/24/2021  9:30 AM Gerlean Care, PT [facility]   11/29/2021  9:30 AM Mich Chadd, PTA [facility]   12/3/2021  8:15 AM Cheryl Gibbs MD [facility] AMB

## 2021-10-25 ENCOUNTER — APPOINTMENT (OUTPATIENT)
Dept: PHYSICAL THERAPY | Age: 61
End: 2021-10-25
Attending: ORTHOPAEDIC SURGERY
Payer: COMMERCIAL

## 2021-10-25 ENCOUNTER — TELEPHONE (OUTPATIENT)
Dept: ORTHOPEDIC SURGERY | Age: 61
End: 2021-10-25

## 2021-10-25 NOTE — TELEPHONE ENCOUNTER
Patient called in to inquire about status of [place] paperwork. Patient states he dropped if off on 10/18/21 and was told in appointment it would be completed on that date. Patient is requesting call back with status.     Phn #: [phone]

## 2021-10-26 ENCOUNTER — DOCUMENTATION ONLY (OUTPATIENT)
Dept: ORTHOPEDIC SURGERY | Age: 61
End: 2021-10-26

## 2021-10-26 NOTE — TELEPHONE ENCOUNTER
Attempted to call pt--MAILBOX FULL--there is no FMLA form at --there is a [employer] form that was completed and faxed today

## 2021-10-29 ENCOUNTER — APPOINTMENT (OUTPATIENT)
Dept: PHYSICAL THERAPY | Age: 61
End: 2021-10-29
Attending: ORTHOPAEDIC SURGERY
Payer: COMMERCIAL

## 2021-11-01 ENCOUNTER — HOSPITAL ENCOUNTER (OUTPATIENT)
Dept: PHYSICAL THERAPY | Age: 61
Discharge: HOME | End: 2021-11-01
Attending: ORTHOPAEDIC SURGERY
Payer: COMMERCIAL

## 2021-11-01 PROCEDURE — 97110 THERAPEUTIC EXERCISES: CPT

## 2021-11-01 PROCEDURE — 97140 MANUAL THERAPY 1/> REGIONS: CPT

## 2021-11-01 PROCEDURE — 97035 APP MDLTY 1+ULTRASOUND EA 15: CPT

## 2021-11-01 NOTE — PROGRESS NOTES
PT DAILY TREATMENT NOTE     Patient Name: Marialuisa Britt. Date:2021  : 1960  [x]  Patient  Verified  Payor: BLUE DEVORA / Plan: Karan Hameed [id number] PPO / Product Type: PPO /    In time:926  Out time:1025  Total Treatment Time (min): 61  Visit #: 3 of 10    Medicare/BCBS Only   Total Timed Codes (min):  59 1:1 Treatment Time:  49       Treatment Area: Pain in left shoulder [M25.512]  Strain of muscle(s) and tendon(s) of the rotator cuff of left shoulder, subsequent encounter [S46.437D]    SUBJECTIVE  Pain Level (0-10 scale): 8  Any medication changes, allergies to medications, adverse drug reactions, diagnosis change, or new procedure performed?: [x] No    [] Yes (see summary sheet for update)  Subjective functional status/changes:   [] No changes reported  Patient reported increased pain and stiffness first thing in the morning     OBJECTIVE  Modality rationale: decrease inflammation, decrease pain and increase tissue extensibility to improve the patients ability to improve left UE mobility   Min Type Additional Details      []? Estim:  []? Unatt       []? IFC  []? Premod                        []?Other:  []?w/ice   []?w/heat  Position:  Location:      []? Estim: []? Att    []? TENS instruct  []? NMES                    []?Other:  []?w/US   []?w/ice   []?w/heat  Position:  Location:      []? Traction: []? Cervical       []? Lumbar                       []? Prone          []? Supine                       []?Intermittent   []? Continuous Lbs:  []? before manual  []? after manual    8 [x]? Ultrasound: [x]? Continuous   []? Pulsed                           []? 1MHz   [x]? 3MHz W/cm2: 1.5  Location: left anterior capsule left shoulder      []? Iontophoresis with dexamethasone         Location: []? Take home patch   []? In clinic      []? Ice     []?  heat  []? Ice massage  []? Laser   []? Anodyne Position:  Location:      []? Laser with stim  []? Other:  Position:  Location:      []? Vasopneumatic Device    []? Right     []? Left  Pre-treatment girth:  Post-treatment girth:  Measured at (location):  Pressure:       []? lo []? med []? hi   Temperature: []? lo []? med []? hi    [x]? Skin assessment post-treatment:  [x]? intact []? redness- no adverse reaction    []? redness - adverse reaction:        36 min Therapeutic Exercise:  [x] See flow sheet :   Rationale: increase ROM and increase strength to improve the patients ability to perform ADLs      15 min Manual Therapy:  STJ mobs, GHJ posterior and inferior mobs, TPR UT/LE, infraspinatus, lats and sub scap   The manual therapy interventions were performed at a separate and distinct time from the therapeutic activities interventions. Rationale: decrease pain and increase ROM to perform ADLs              With   [] TE   [] TA   [] neuro   [] other: Patient Education: [x] Review HEP    [] Progressed/Changed HEP based on:   [] positioning   [] body mechanics   [] transfers   [] heat/ice application    [] other:      Other Objective/Functional Measures:   - improved PROM to approx 90 deg abd and flex     Pain Level (0-10 scale) post treatment: 4    ASSESSMENT/Changes in Function: patient presented with continued left shoulder ROM restrictions but tolerated progressed therex and demonstrated improved mobility with PROM today. Patient will continue to benefit from skilled PT services to modify and progress therapeutic interventions, address functional mobility deficits, address ROM deficits, address strength deficits, analyze and address soft tissue restrictions and analyze and cue movement patterns to attain remaining goals.      [x]  See Plan of Care  []  See progress note/recertification  []  See Discharge Summary         Progress towards goals / Updated goals:  Updated Goals: to be achieved in 10 treatments:             Short Term Goals: To be accomplished in 5 treatments:  1.  Pt will be compliant and independent with HEP in order to facilitate PT sessions and aid with self management             Eval Status:  Initiated             Current Status: reported compliance 10/14/21  2.  Pt to tolerate 30 min or more of TE and/or Interventions w/o increased s/s             Eval Status:  Initiated             Current Status: continues to have increase in pain after treatment 10/14/21     Long Term Goals: To be accomplished in 10 treatments:  1.  Pt will report 50% improvement or better with left shoulder dysfunction to show a significant increase in ability to tolerate ADL            Current Status: slight progression, 10% improvement reported (10/14/21)  2.  Pt will demonstrate PROM left shoulder Flx/AB to 90* with pain 3/10 or better for progress to performing AAROM with daily exercises             Current Status: progressing, post manual ABD/Flx 82 degs  (10/18/21)  3.  Pt will initiate AROM to shoulder level for carryover to improving left shoulder function for progress to normal elevation            Current Status: slight progression, continues to be AAROM during therex with some but minimal improvement in AROM ABD 30 degs Flx 40 degs  (10/22/21)  4.  Pt will perform cone stack to shoulder level for progress to reaching overhead to perform usual work tasks              Current Status:Not met, unable to perform at this time  (10/14/21)  5.  Pt will improve FOTO score to 70 in 22 visits to show significant improvement in function for progress to good shoulder function with daily activity            Current Status: slight progression, FOTO 40  (10/14/21)         PLAN  []  Upgrade activities as tolerated     [x]  Continue plan of care  []  Update interventions per flow sheet       []  Discharge due to:_  []  Other:_      Sarina Pinedo PTA 11/1/2021  9:39 AM    Future Appointments   Date Time Provider Billie Turcios   11/5/2021  9:30 AM Minerva Mortensen PTA [facility]   11/8/2021  9:30 AM Minerva Mortensen PTA [facility]   11/12/2021  9:30 AM Skyler Hendrix Juan Gamboa, PT [facility]   11/15/2021  9:30 AM Marilu Sanchez PTA [facility]   11/19/2021  9:30 AM Hughes Forget [facility]   11/22/2021  9:30 AM Thong [facility]   11/24/2021  9:30 AM Argenis Stringer, PT [facility]   11/29/2021  9:30 AM Marilu Sanchez PTA [facility]   12/3/2021  8:15 AM Jennifer Gibbs MD [facility] AMB

## 2021-11-05 ENCOUNTER — HOSPITAL ENCOUNTER (OUTPATIENT)
Dept: PHYSICAL THERAPY | Age: 61
Discharge: HOME | End: 2021-11-05
Attending: ORTHOPAEDIC SURGERY
Payer: COMMERCIAL

## 2021-11-05 PROCEDURE — 97032 APPL MODALITY 1+ESTIM EA 15: CPT

## 2021-11-05 PROCEDURE — 97110 THERAPEUTIC EXERCISES: CPT

## 2021-11-05 PROCEDURE — 97140 MANUAL THERAPY 1/> REGIONS: CPT

## 2021-11-05 NOTE — PROGRESS NOTES
PT DAILY TREATMENT NOTE     Patient Name: Sarmad Puri.   Date:2021  : 1960  [x]  Patient  Verified  Payor: BLUE CROSS / Plan: Karan Hameed [id number] PPO / Product Type: PPO /    In time:930  Out time:1020  Total Treatment Time (min): 50  Visit #: 4 of 10    Medicare/BCBS Only   Total Timed Codes (min):  50 1:1 Treatment Time:  50       Treatment Area: Pain in left shoulder [M25.512]  Strain of muscle(s) and tendon(s) of the rotator cuff of left shoulder, subsequent encounter [S46.012D]    SUBJECTIVE  Pain Level (0-10 scale): 5  Any medication changes, allergies to medications, adverse drug reactions, diagnosis change, or new procedure performed?: [x] No    [] Yes (see summary sheet for update)  Subjective functional status/changes:   [] No changes reported  Patient reported continued pain and stiffness in left shoulder    OBJECTIVE    Modality rationale: decrease inflammation and decrease pain to improve the patients ability to perform ADLs   Min Type Additional Details   10 [] Estim:  [x]Unatt       [x]IFC  []Premod                        []Other:  [x]w/ice   []w/heat  Position: seated   Location: left shoulder   8 [x] Estim: [x]Att    []TENS instruct  []NMES                    [x]Other: combo [x]w/US   []w/ice   []w/heat  Position: right S/L  Location: left lats and infraspinatus    []  Traction: [] Cervical       []Lumbar                       [] Prone          []Supine                       []Intermittent   []Continuous Lbs:  [] before manual  [] after manual    []  Ultrasound: []Continuous   [] Pulsed                           []1MHz   []3MHz W/cm2:  Location:    []  Iontophoresis with dexamethasone         Location: [] Take home patch   [] In clinic    []  Ice     []  heat  []  Ice massage  []  Laser   []  Anodyne Position:  Location:    []  Laser with stim  []  Other:  Position:  Location:    []  Vasopneumatic Device    []  Right     []  Left  Pre-treatment girth:  Post-treatment girth: Measured at (location):  Pressure:       [] lo [] med [] hi   Temperature: [] lo [] med [] hi   [] Skin assessment post-treatment:  []intact []redness- no adverse reaction    []redness - adverse reaction:       12 min Therapeutic Exercise:  [] See flow sheet :   Rationale: increase ROM and increase strength to improve the patients ability to perform ADLs      20 min Manual Therapy:  Left GHJ posterior and inferior mobs, STJ mobs, lat stretch with scapular blocking, PROM into stretch with overpressure and STM/TPR UT/LS, subscap and infraspinatus   The manual therapy interventions were performed at a separate and distinct time from the therapeutic activities interventions. Rationale: decrease pain, increase ROM and increase tissue extensibility to perform ADLs          With   [] TE   [] TA   [] neuro   [] other: Patient Education: [x] Review HEP    [] Progressed/Changed HEP based on:   [] positioning   [] body mechanics   [] transfers   [] heat/ice application    [] other:      Other Objective/Functional Measures:   - AROM left shd flex 45 degs, ABD 44 degs  - AAROM left flex/ABD      Pain Level (0-10 scale) post treatment: 4    ASSESSMENT/Changes in Function: slight improvement in AROM and AAROM of left shoulder but continued capsular tightness restricting overall progression. Emphasized need to stretch at home throughout the day, perform HEP 3-5x per day to make gains. Patient verbalized understanding today    Patient will continue to benefit from skilled PT services to modify and progress therapeutic interventions, address functional mobility deficits, address ROM deficits, address strength deficits, analyze and address soft tissue restrictions and analyze and cue movement patterns to attain remaining goals.      [x]  See Plan of Care  []  See progress note/recertification  []  See Discharge Summary         Progress towards goals / Updated goals:  Updated Goals: to be achieved in 10 treatments:             Short Term Goals: To be accomplished in 5 treatments:  1.  Pt will be compliant and independent with HEP in order to facilitate PT sessions and aid with self management             Eval Status:  Initiated             Current Status: reported compliance 10/14/21  2.  Pt to tolerate 30 min or more of TE and/or Interventions w/o increased s/s             Eval Status:  Initiated             Current Status: continues to have increase in pain after treatment 10/14/21     Long Term Goals: To be accomplished in 10 treatments:  1.  Pt will report 50% improvement or better with left shoulder dysfunction to show a significant increase in ability to tolerate ADL            Current Status: slight progression, 10% improvement reported (10/14/21)  2.  Pt will demonstrate PROM left shoulder Flx/AB to 90* with pain 3/10 or better for progress to performing AAROM with daily exercises             Current Status: progressing, post manual ABD/Flx 94 degs  (11/5/21)  3.  Pt will initiate AROM to shoulder level for carryover to improving left shoulder function for progress to normal elevation            Current Status: slight progression AROM ABD 44 degs Flx 45 degs  (11/5/21)  4.  Pt will perform cone stack to shoulder level for progress to reaching overhead to perform usual work tasks              Current Status:Not met, unable to perform at this time  (11/5/21)  5.  Pt will improve FOTO score to 70 in 22 visits to show significant improvement in function for progress to good shoulder function with daily activity            Current Status: slight progression, FOTO 40  (10/14/21)       PLAN  []  Upgrade activities as tolerated     [x]  Continue plan of care  []  Update interventions per flow sheet       []  Discharge due to:_  []  Other:_      Ekta Lopez PTA 11/5/2021  10:08 AM    Future Appointments   Date Time Provider Billie Turcios   11/8/2021  9:30 AM Thais Justice PTA [place] [facility] 11/12/2021  9:30 AM Evgeny Montes, [facility]   11/15/2021  9:30 AM Glo Sanz PTA [facility]   11/19/2021  9:30 AM Glo Sanz PTA [facility]   11/22/2021  9:30 AM Dieter Ash [facility]   11/24/2021  9:30 AM Evgeny Montes, [facility]   11/29/2021  9:30 AM Glo Sanz PTA [facility]   12/3/2021  8:15 AM Huttman, Meryle Littler, MD [facility] AMB

## 2021-11-08 ENCOUNTER — HOSPITAL ENCOUNTER (OUTPATIENT)
Dept: PHYSICAL THERAPY | Age: 61
Discharge: HOME | End: 2021-11-08
Attending: ORTHOPAEDIC SURGERY
Payer: COMMERCIAL

## 2021-11-08 PROCEDURE — 97035 APP MDLTY 1+ULTRASOUND EA 15: CPT

## 2021-11-08 PROCEDURE — 97140 MANUAL THERAPY 1/> REGIONS: CPT

## 2021-11-08 PROCEDURE — 97110 THERAPEUTIC EXERCISES: CPT

## 2021-11-08 NOTE — PROGRESS NOTES
PT DAILY TREATMENT NOTE     Patient Name: Jovana Mercer.   Date:2021  : 1960  [x]  Patient  Verified  Payor: BLUE CROSS / Plan: Karan Hameed [id number] PPO / Product Type: PPO /    In time:930  Out time:1030  Total Treatment Time (min): 60  Visit #: 5 of 10    Medicare/BCBS Only   Total Timed Codes (min):  50 1:1 Treatment Time:  50       Treatment Area: Pain in left shoulder [M25.512]  Strain of muscle(s) and tendon(s) of the rotator cuff of left shoulder, subsequent encounter [S46.990D]    SUBJECTIVE  Pain Level (0-10 scale): 3  Any medication changes, allergies to medications, adverse drug reactions, diagnosis change, or new procedure performed?: [x] No    [] Yes (see summary sheet for update)  Subjective functional status/changes:   [] No changes reported  Patient reported improved pain today in left shoulder    OBJECTIVE    Modality rationale: decrease inflammation, decrease pain and increase tissue extensibility to improve the patients ability to perform ADLs   Min Type Additional Details   10 [] Estim:  []Unatt       []IFC  []Premod                        []Other:  [x]w/ice   []w/heat  Position:seated   Location:left shoulder    [] Estim: []Att    []TENS instruct  []NMES                    []Other:  []w/US   []w/ice   []w/heat  Position:  Location:    []  Traction: [] Cervical       []Lumbar                       [] Prone          []Supine                       []Intermittent   []Continuous Lbs:  [] before manual  [] after manual   8 [x]  Ultrasound: [x]Continuous   [] Pulsed                           [x]1MHz   []3MHz W/cm2:1.8  Location: posterior capsule    []  Iontophoresis with dexamethasone         Location: [] Take home patch   [] In clinic    []  Ice     []  heat  []  Ice massage  []  Laser   []  Anodyne Position:  Location:    []  Laser with stim  []  Other:  Position:  Location:    []  Vasopneumatic Device    []  Right     []  Left  Pre-treatment girth:  Post-treatment girth: Measured at (location):  Pressure:       [] lo [] med [] hi   Temperature: [] lo [] med [] hi   [] Skin assessment post-treatment:  []intact []redness- no adverse reaction    []redness - adverse reaction:         27 min Therapeutic Exercise:  [x] See flow sheet :   Rationale: increase ROM and increase strength to improve the patients ability to perform ADLs        15 min Manual Therapy:   Left GHJ posterior and inferior mobs, STJ mobs, lat stretch with scapular blocking, PROM into stretch with overpressure and STM/TPR UT/LS, subscap and infraspinatus   The manual therapy interventions were performed at a separate and distinct time from the therapeutic activities interventions. Rationale: decrease pain, increase ROM, increase tissue extensibility and decrease trigger points to perform ADLs            With   [] TE   [] TA   [] neuro   [] other: Patient Education: [x] Review HEP    [] Progressed/Changed HEP based on:   [] positioning   [] body mechanics   [] transfers   [] heat/ice application    [] other:      Other Objective/Functional Measures:   - FOTO 47  - 30% improvement reported     Pain Level (0-10 scale) post treatment: 5    ASSESSMENT/Changes in Function: Patient arrived with improved relaxed resting posture with left UE hanging at side, and demonstrated improved AAROM and PROM today. Patient continues to struggle with raising left shoulder higher than approximately 45 degs without assistance. Patient will continue to benefit from skilled PT services to modify and progress therapeutic interventions, address functional mobility deficits, address ROM deficits, address strength deficits, analyze and address soft tissue restrictions and analyze and cue movement patterns to attain remaining goals.      [x]  See Plan of Care  []  See progress note/recertification  []  See Discharge Summary         Progress towards goals / Updated goals:  Updated Goals: to be achieved in 10 treatments:             Short Term Goals: To be accomplished in 5 treatments:  1.  Pt will be compliant and independent with HEP in order to facilitate PT sessions and aid with self management             Eval Status:  Initiated             Current Status: met 11//8/21  2.  Pt to tolerate 30 min or more of TE and/or Interventions w/o increased s/s             Eval Status:  Initiated             Current Status:continued increased pain post treatment 11/8/21     Long Term Goals: To be accomplished in 10 treatments:  1.  Pt will report 50% improvement or better with left shoulder dysfunction to show a significant increase in ability to tolerate ADL            Current Status: progressing, 30% improvement reported (11/8/21)  2.  Pt will demonstrate PROM left shoulder Flx/AB to 90* with pain 3/10 or better for progress to performing AAROM with daily exercises             Current Status: progressing, post manual ABD/Flx 94 degs  (11/5/21)  3.  Pt will initiate AROM to shoulder level for carryover to improving left shoulder function for progress to normal elevation            Current Status: slight progression AROM ABD 44 degs Flx 45 degs  (11/5/21)  4.  Pt will perform cone stack to shoulder level for progress to reaching overhead to perform usual work tasks              Current Status:Not met, unable to perform at this time  (11/5/21)  5.  Pt will improve FOTO score to 70 in 22 visits to show significant improvement in function for progress to good shoulder function with daily activity            Current Status: progressing, FOTO 47  (11/8/21)    PLAN  []  Upgrade activities as tolerated     [x]  Continue plan of care  []  Update interventions per flow sheet       []  Discharge due to:_  []  Other:_      Renetta Pierce PTA 11/8/2021  9:34 AM    Future Appointments   Date Time Provider Billie Turcios   11/12/2021  9:30 AM Bret Mo, PT [facility]   11/15/2021  9:30 AM Tania Hagan PTA [facility] 11/19/2021  9:30 AM Parag Belle PTA [address]   11/22/2021  9:30 AM Parag Belle PTA [address]   11/24/2021  9:30 AM Sebastián Bell PT [address]   11/29/2021  9:30 AM Parag Belle PTA [address]   12/3/2021  8:15 AM Rafael Gibbs MD [facility] BS AMB

## 2021-11-12 ENCOUNTER — HOSPITAL ENCOUNTER (OUTPATIENT)
Dept: PHYSICAL THERAPY | Age: 61
Discharge: HOME | End: 2021-11-12
Attending: ORTHOPAEDIC SURGERY
Payer: COMMERCIAL

## 2021-11-12 PROCEDURE — 97140 MANUAL THERAPY 1/> REGIONS: CPT

## 2021-11-12 PROCEDURE — 97110 THERAPEUTIC EXERCISES: CPT

## 2021-11-12 NOTE — PROGRESS NOTES
PT DAILY TREATMENT NOTE     Patient Name: Jarrett Hunter.   Date:2021  : 1960  [x]  Patient  Verified  Payor: BLUE DEVORA / Plan: Karan Hameed [id number] PPO / Product Type: PPO /    In time:9:33  Out time:1024  Total Treatment Time (min): 51  Visit #: 6 of 10    Medicare/BCBS Only   Total Timed Codes (min):  51 1:1 Treatment Time:  51       Treatment Area: Pain in left shoulder [M25.512]  Strain of muscle(s) and tendon(s) of the rotator cuff of left shoulder, subsequent encounter [S46.617D]    SUBJECTIVE  Pain Level (0-10 scale): 3-4  Any medication changes, allergies to medications, adverse drug reactions, diagnosis change, or new procedure performed?: [x] No    [] Yes (see summary sheet for update)  Subjective functional status/changes:   [] No changes reported  Doing better, my wife have been stretching it for me    OBJECTIVE    Modality rationale: decrease inflammation and decrease pain to improve the patients ability to tolerate post treatment soreness   Min Type Additional Details    [] Estim:  []Unatt       []IFC  []Premod                        []Other:  []w/ice   []w/heat  Position:  Location:    [] Estim: []Att    []TENS instruct  []NMES                    []Other:  []w/US   []w/ice   []w/heat  Position:  Location:    []  Traction: [] Cervical       []Lumbar                       [] Prone          []Supine                       []Intermittent   []Continuous Lbs:  [] before manual  [] after manual    []  Ultrasound: []Continuous   [] Pulsed                           []1MHz   []3MHz W/cm2:  Location:    []  Iontophoresis with dexamethasone         Location: [] Take home patch   [] In clinic   8 [x]  Ice     []  heat  []  Ice massage  []  Laser   []  Anodyne Position: Supine  Location: Left Shoulder    []  Laser with stim  []  Other:  Position:  Location:    []  Vasopneumatic Device    []  Right     []  Left  Pre-treatment girth:  Post-treatment girth:  Measured at (location): Pressure:       [] lo [] med [] hi   Temperature: [] lo [] med [] hi   [] Skin assessment post-treatment:  []intact []redness- no adverse reaction    []redness - adverse reaction:     30 min Therapeutic Exercise:  [x] See flow sheet : Assess mobility   Rationale: increase ROM, increase strength and improve coordination to improve the patients ability to improve daily function    3 min Therapeutic Activity:  [x]  See flow sheet : Cone stack   Rationale: increase ROM, increase strength and improve coordination  to improve the patients ability to improve daily activity     10 min Manual Therapy:  [address] [facility] Joint mobs for elevation, LAD left UE, Stretch left latissimus dorsi   The manual therapy interventions were performed at a separate and distinct time from the therapeutic activities interventions. Rationale: decrease pain, increase ROM and increase tissue extensibility to improve daily function          With   [x] TE   [] TA   [] neuro   [] other: Patient Education: [x] Review HEP    [] Progressed/Changed HEP based on:   [] positioning   [] body mechanics   [] transfers   [] heat/ice application    [] other:      Other Objective/Functional Measures:      AROM PROM Strength Pain? ?    Right Left Right Left Right Left    Shoulder Flx  50  90      Shoulder Ext  23  42      Shoulder ABD  60  72      Shoulder ADD          Shoulder IR          Shoulder ER            - AAROM with pulley Left Shoulder Flx 83 Scap 85  - Tight Latissimus dorsi     Pain Level (0-10 scale) post treatment: 4    ASSESSMENT/Changes in Function:   - Pt with improved AROM/PROM  - Increased tolerance to manual therapy    Patient will continue to benefit from skilled PT services to modify and progress therapeutic interventions, address functional mobility deficits, address ROM deficits, address strength deficits, analyze and address soft tissue restrictions and analyze and cue movement patterns to attain remaining goals.      []  See Plan of Care  []  See progress note/recertification  []  See Discharge Summary         Progress towards goals / Updated goals:  Updated Goals: to be achieved in 10 treatments:             Short Term Goals: To be accomplished in 5 treatments:  1.  Pt will be compliant and independent with HEP in order to facilitate PT sessions and aid with self management             Eval Status:  Initiated             Current Status: met 11//8/21  2.  Pt to tolerate 30 min or more of TE and/or Interventions w/o increased s/s             Eval Status:  Initiated             Current Status:continued increased pain post treatment 11/8/21     Long Term Goals: To be accomplished in 10 treatments:  1.  Pt will report 50% improvement or better with left shoulder dysfunction to show a significant increase in ability to tolerate ADL            Current Status: progressing, 30% improvement reported (11/8/21)  2.  Pt will demonstrate PROM left shoulder Flx/AB to 90* with pain 3/10 or better for progress to performing AAROM with daily exercises             Current Status: progressing, post manual ABD/Flx 94 degs  (11/5/21)  3.  Pt will initiate AROM to shoulder level for carryover to improving left shoulder function for progress to normal elevation            Current Status: slight progression AROM ABD 44 degs Flx 45 degs  (11/5/21)  4.  Pt will perform cone stack to shoulder level for progress to reaching overhead to perform usual work tasks              Current Status:Not met, unable to perform at this time  (11/5/21)  5.  Pt will improve FOTO score to 70 in 22 visits to show significant improvement in function for progress to good shoulder function with daily activity            Current Status: progressing, FOTO 47  (11/8/21)    PLAN  [x]  Upgrade activities as tolerated     [x]  Continue plan of care  []  Update interventions per flow sheet       []  Discharge due to:_  []  Other:_      Jannette Thakkar, PT 11/12/2021  9:36 AM    Future Appointments   Date Time Provider Department Center   11/15/2021  9:30 AM Kiko Salmeron [place] [address] [facility]   11/19/2021  9:30 AM Kiko Salmeron [place] [address] [facility]   11/22/2021  9:30 AM [place] [address] [facility]   11/24/2021  9:30 AM Kinjal Akers, PT [place] [address] [facility]   11/29/2021  9:30 AM Thais Pisano PTA [place] [address] [facility]   12/3/2021  8:15 AM Huttman, Celso Babinski, MD [facility] BS AMB

## 2021-11-15 ENCOUNTER — HOSPITAL ENCOUNTER (OUTPATIENT)
Dept: PHYSICAL THERAPY | Age: 61
Discharge: HOME | End: 2021-11-15
Attending: ORTHOPAEDIC SURGERY
Payer: COMMERCIAL

## 2021-11-15 PROCEDURE — 97110 THERAPEUTIC EXERCISES: CPT

## 2021-11-15 PROCEDURE — 97014 ELECTRIC STIMULATION THERAPY: CPT

## 2021-11-15 PROCEDURE — 97140 MANUAL THERAPY 1/> REGIONS: CPT

## 2021-11-15 NOTE — PROGRESS NOTES
PT DAILY TREATMENT NOTE     Patient Name: Rut Thibodeaux.   Date:11/15/2021  : 1960  [x]  Patient  Verified  Payor: BLUE CROSS / Plan: Karan Hameed [id number] PPO / Product Type: PPO /    In time:930  Out time:1025  Total Treatment Time (min): 55  Visit #: 7 of 10    Medicare/BCBS Only   Total Timed Codes (min):  55 1:1 Treatment Time:  45       Treatment Area: Pain in left shoulder [M25.512]  Strain of muscle(s) and tendon(s) of the rotator cuff of left shoulder, subsequent encounter [S46.012D]    SUBJECTIVE  Pain Level (0-10 scale): 4  Any medication changes, allergies to medications, adverse drug reactions, diagnosis change, or new procedure performed?: [x] No    [] Yes (see summary sheet for update)  Subjective functional status/changes:   [] No changes reported  Patient reported improving PROM and pain     OBJECTIVE    Modality rationale: decrease inflammation and decrease pain to improve the patients ability to perform ADLs   Min Type Additional Details   10 [x] Estim:  [x]Unatt       [x]IFC  []Premod                        []Other:  [x]w/ice   []w/heat  Position:seated  Location: left shoulder    [] Estim: []Att    []TENS instruct  []NMES                    []Other:  []w/US   []w/ice   []w/heat  Position:  Location:    []  Traction: [] Cervical       []Lumbar                       [] Prone          []Supine                       []Intermittent   []Continuous Lbs:  [] before manual  [] after manual    []  Ultrasound: []Continuous   [] Pulsed                           []1MHz   []3MHz W/cm2:  Location:    []  Iontophoresis with dexamethasone         Location: [] Take home patch   [] In clinic    []  Ice     []  heat  []  Ice massage  []  Laser   []  Anodyne Position:  Location:    []  Laser with stim  []  Other:  Position:  Location:    []  Vasopneumatic Device    []  Right     []  Left  Pre-treatment girth:  Post-treatment girth:  Measured at (location):  Pressure:       [] lo [] med [] hi Temperature: [] lo [] med [] hi   [] Skin assessment post-treatment:  []intact []redness- no adverse reaction    []redness - adverse reaction:           30 min Therapeutic Exercise:  [x] See flow sheet :   Rationale: increase ROM and increase strength to improve the patients ability to perform ADLs      15 min Manual Therapy:  Left GHJ and STJ mobs, TPR UT/LS and along medial boarder of scapula, and PROM with over pressure into stretch in flex, ABD and ER   The manual therapy interventions were performed at a separate and distinct time from the therapeutic activities interventions. Rationale: decrease pain, increase ROM and increase tissue extensibility to perform ADLs              With   [] TE   [] TA   [] neuro   [] other: Patient Education: [x] Review HEP    [] Progressed/Changed HEP based on:   [] positioning   [] body mechanics   [] transfers   [] heat/ice application    [] other:      Other Objective/Functional Measures:    AAROM left shd ABD and flex 70 degs     Pain Level (0-10 scale) post treatment: 4    ASSESSMENT/Changes in Function: Improving AROM and PROM of left shoulder but continued substitutions with UT to initiate AROM. Patient will continue to benefit from skilled PT services to modify and progress therapeutic interventions, address functional mobility deficits, address ROM deficits and address strength deficits to attain remaining goals.      [x]  See Plan of Care  []  See progress note/recertification  []  See Discharge Summary         Progress towards goals / Updated goals:  Updated Goals: to be achieved in 10 treatments:             Short Term Goals: To be accomplished in 5 treatments:  1.  Pt will be compliant and independent with HEP in order to facilitate PT sessions and aid with self management             Eval Status:  Initiated             Current Status: met 11//8/21  2.  Pt to tolerate 30 min or more of TE and/or Interventions w/o increased s/s             Eval Status:  Initiated             Current Status:continued increased pain post treatment 11/8/21     Long Term Goals: To be accomplished in 10 treatments:  1.  Pt will report 50% improvement or better with left shoulder dysfunction to show a significant increase in ability to tolerate ADL            Current Status: progressing, 30% improvement reported (11/8/21)  2.  Pt will demonstrate PROM left shoulder Flx/AB to 90* with pain 3/10 or better for progress to performing AAROM with daily exercises             Current Status: progressing, post manual ABD/Flx 94 degs  (11/5/21)  3.  Pt will initiate AROM to shoulder level for carryover to improving left shoulder function for progress to normal elevation            Current Status:  AAROM left shd ABD and flex 70 degs (11/15/21) ,slight progression AROM ABD 44 degs Flx 45 degs  (11/5/21)  4.  Pt will perform cone stack to shoulder level for progress to reaching overhead to perform usual work tasks              Current Status:Not met, unable to perform at this time  (11/5/21)  5.  Pt will improve FOTO score to 70 in 22 visits to show significant improvement in function for progress to good shoulder function with daily activity            Current Status: progressing, FOTO 47  (11/8/21)       PLAN  []  Upgrade activities as tolerated     [x]  Continue plan of care  []  Update interventions per flow sheet       []  Discharge due to:_  []  Other:_      Sarina Pinedo PTA 11/15/2021  9:46 AM    Future Appointments   Date Time Provider Billie Turcios   11/19/2021  9:30 AM Minerva Mortensen PTA [facility]   11/22/2021  9:30 AM Minerva Mortensen PTA [facility]   11/24/2021  9:30 AM Maday Marie, PT [facility]   11/29/2021  9:30 AM Minerva Mortensen PTA [facility]   12/3/2021  8:15 AM Ezekiel Campbell MD [facility] BS AMB

## 2021-11-19 ENCOUNTER — HOSPITAL ENCOUNTER (OUTPATIENT)
Dept: PHYSICAL THERAPY | Age: 61
Discharge: HOME | End: 2021-11-19
Attending: ORTHOPAEDIC SURGERY
Payer: COMMERCIAL

## 2021-11-19 PROCEDURE — 97110 THERAPEUTIC EXERCISES: CPT

## 2021-11-19 PROCEDURE — 97014 ELECTRIC STIMULATION THERAPY: CPT

## 2021-11-19 PROCEDURE — 97140 MANUAL THERAPY 1/> REGIONS: CPT

## 2021-11-19 NOTE — PROGRESS NOTES
PT DAILY TREATMENT NOTE     Patient Name: Edmar .   Date:2021  : 1960  [x]  Patient  Verified  Payor: BLUE CROSS / Plan: Karan Hameed [id number] PPO / Product Type: PPO /    In time:928  Out time:1032  Total Treatment Time (min): 59  Visit #: 8 of 10    Medicare/BCBS Only   Total Timed Codes (min):  64 1:1 Treatment Time:  64       Treatment Area: Pain in left shoulder [M25.512]  Strain of muscle(s) and tendon(s) of the rotator cuff of left shoulder, subsequent encounter [S46.012D]    SUBJECTIVE  Pain Level (0-10 scale): 4  Any medication changes, allergies to medications, adverse drug reactions, diagnosis change, or new procedure performed?: [x] No    [] Yes (see summary sheet for update)  Subjective functional status/changes:   [] No changes reported  Patient reported moderate pain and  tightness in left shoulder    OBJECTIVE    Modality rationale: decrease inflammation and decrease pain to improve the patients ability to perform ADLs   Min Type Additional Details   10 [x] Estim:  [x]Unatt       [x]IFC  []Premod                        []Other:  [x]w/ice   []w/heat  Position: seated   Location: left shoulder    [] Estim: []Att    []TENS instruct  []NMES                    []Other:  []w/US   []w/ice   []w/heat  Position:  Location:    []  Traction: [] Cervical       []Lumbar                       [] Prone          []Supine                       []Intermittent   []Continuous Lbs:  [] before manual  [] after manual    []  Ultrasound: []Continuous   [] Pulsed                           []1MHz   []3MHz W/cm2:  Location:    []  Iontophoresis with dexamethasone         Location: [] Take home patch   [] In clinic    []  Ice     []  heat  []  Ice massage  []  Laser   []  Anodyne Position:  Location:    []  Laser with stim  []  Other:  Position:  Location:    []  Vasopneumatic Device    []  Right     []  Left  Pre-treatment girth:  Post-treatment girth:  Measured at (location):  Pressure: [] lo [] med [] hi   Temperature: [] lo [] med [] hi   [] Skin assessment post-treatment:  []intact []redness- no adverse reaction    []redness - adverse reaction:       39 min Therapeutic Exercise:  [x] See flow sheet :   Rationale: increase ROM and increase strength to improve the patients ability to perform ADLs      15 min Manual Therapy:  Left shoulder STJ and GHJ mobs, PROM and manual stretching with over pressure into flex, abd and ER   The manual therapy interventions were performed at a separate and distinct time from the therapeutic activities interventions. Rationale: increase ROM, increase tissue extensibility and decrease trigger points to perform ADLs         With   [] TE   [] TA   [] neuro   [] other: Patient Education: [x] Review HEP    [] Progressed/Changed HEP based on:   [] positioning   [] body mechanics   [] transfers   [] heat/ice application    [] other:      Other Objective/Functional Measures:   See goals below   - ER 5 degs on left  Pain Level (0-10 scale) post treatment: 3    ASSESSMENT/Changes in Function: Patient has made steady progress since last assessment increasing AROM left shoulder to 72 degs of flex and 65 degs of ABD. Patient continues to struggle with reduced functional AROM and strength at tihis time and will continue to benefit from skilled PT services to modify and progress therapeutic interventions, address functional mobility deficits, address ROM deficits, address strength deficits, analyze and address soft tissue restrictions and analyze and cue movement patterns to attain remaining goals.      [x]  See Plan of Care  []  See progress note/recertification  []  See Discharge Summary         Progress towards goals / Updated goals:  Updated Goals: to be achieved in 10 treatments:             Short Term Goals: To be accomplished in 5 treatments:  1.  Pt will be compliant and independent with HEP in order to facilitate PT sessions and aid with self management             Eval Status:  Initiated             Current Status: met 11//19/21  2.  Pt to tolerate 30 min or more of TE and/or Interventions w/o increased s/s             Eval Status:  Initiated             Current Status:progressing, slight increased pain post treatment 11/19/21     Long Term Goals: To be accomplished in 10 treatments:  1.  Pt will report 50% improvement or better with left shoulder dysfunction to show a significant increase in ability to tolerate ADL            Current Status: Met 50% improvement reported (11/19/21)  2.  Pt will demonstrate PROM left shoulder Flx/AB to 90* with pain 3/10 or better for progress to performing AAROM with daily exercises             Current Status: met no increase in pain and 98 degs AAROM ABD/flex  (11/19/21)  3.  Pt will initiate AROM to shoulder level for carryover to improving left shoulder function for progress to normal elevation            Current Status: met, current AROM left shoulder flex 72 degs and ABD 65 degs (11/19/21)  4.  Pt will perform cone stack to shoulder level for progress to reaching overhead to perform usual work tasks              Current Status met at shoulder height (11/19/21)  5.  Pt will improve FOTO score to 70 in 22 visits to show significant improvement in function for progress to good shoulder function with daily activity            Current Status: progressing, FOTO 49  (11/19/21)    PLAN  []  Upgrade activities as tolerated     [x]  Continue plan of care  []  Update interventions per flow sheet       []  Discharge due to:_  [x]  Other:_  PN due, continue 10 visits 2-3x per week  Stephanie Cannon PTA 11/19/2021  9:33 AM    Future Appointments   Date Time Provider Billie Turcios   11/22/2021  9:30 AM Anthony Pope [facility]   11/24/2021  9:30 AM Vanita Huerta, PT [facility]   11/29/2021  9:30 AM Patience Alert, PTA [facility]   12/3/2021  8:15 AM Arthur Enriquez MD [facility]

## 2021-11-19 NOTE — PROGRESS NOTES
[facility] at [address]  [address]  Phone: [phone]      Fax:  [phone]    Progress Note  Patient name: Issac Pereyra. Start of Care: 2021   Referral source: Leonardo Tim MD : 1960      Patient name: Issac Pereyra. Start of Care: 2021   Referral source: Leonardo Tim MD : 1960               Medical Diagnosis: Pain in left shoulder [M25.512]  Strain of muscle(s) and tendon(s) of the rotator cuff of left shoulder, subsequent encounter [S59.939T]  Payor: BLUE CROSS / Plan: [facility] PPO / Product Type: PPO /  Onset Date:21               Treatment Diagnosis: Left ShoulderPain   Prior Hospitalization: see medical history Provider#: [id number]   Medications: Verified on Patient summary List   Comorbidities: OA  Prior Level of Function: Able to use left arm for work and usual activity      Visits from Start of Care: 15    Missed Visits: 0        Key Functional Changes:  Patient has made steady progress since last assessment increasing AROM left shoulder to 72 degs of flex and 65 degs of ABD. Patient continues to struggle with reduced functional AROM and strength at tihis time and will continue to benefit from skilled PT services to modify and progress therapeutic interventions, address functional mobility deficits, address ROM deficits, address strength deficits, analyze and address soft tissue restrictions and analyze and cue movement patterns to attain remaining goals. Progress towards goals / Updated goals:  Updated Goals: to be achieved in 10 treatments:             Short Term Goals: To be accomplished in 5 treatments:  1. Pt will be compliant and independent with HEP in order to facilitate PT sessions and aid with self management             Eval Status:  Initiated             Current Status: met 21  2.   Pt to tolerate 30 min or more of TE and/or Interventions w/o increased s/s             Eval Status:  Initiated             Current Status:progressing, slight increased pain post treatment 11/19/21     Long Term Goals: To be accomplished in 10 treatments:  1. Pt will report 50% improvement or better with left shoulder dysfunction to show a significant increase in ability to tolerate ADL            Current Status: Met 50% improvement reported (11/19/21)  2. Pt will demonstrate PROM left shoulder Flx/AB to 90* with pain 3/10 or better for progress to performing AAROM with daily exercises             Current Status: met no increase in pain and 98 degs AAROM ABD/flex  (11/19/21)  3. Pt will initiate AROM to shoulder level for carryover to improving left shoulder function for progress to normal elevation            Current Status: met, current AROM left shoulder flex 72 degs and ABD 65 degs (11/19/21)  4. Pt will perform cone stack to shoulder level for progress to reaching overhead to perform usual work tasks              Current Status met at shoulder height (11/19/21)  5. Pt will improve FOTO score to 70 in 22 visits to show significant improvement in function for progress to good shoulder function with daily activity            Current Status: progressing, FOTO 49  (11/19/21)     Updated Goals: to be achieved in 10 treatments:  1. Pt to tolerate 30 min or more of TE and/or Interventions w/o increased s/s               Status at last note/certification: progressing, slight increased pain post treatment    Current Status:               2. Pt will demonstrate AROM to 110 degs of left shoulder flex and ABD to improve left shoulder functional mobility for progress to placing items into overhead cabinets                Status at last note/certification: Current AROM left shoulder flex 72 degs and ABD 65 degs    Current Status:            3.   Pt will perform cone stack above shoulder level for carryover to performing usual work tasks                 Status at last note/certification: Able to perform at shoulder height   Current Status:               4.  Pt will improve FOTO score to 70 in 22 visits to show significant improvement in function for progress to good shoulder function with daily activity                 Status at last note/certification: progressing, FOTO 49   Current Status:            ASSESSMENT/RECOMMENDATIONS:  [x]Continue therapy per initial plan/protocol at a frequency of  2 x per week for 10 visits  []Continue therapy with the following recommended changes:_____________________      _____________________________________________________________________  []Discontinue therapy progressing towards or have reached established goals  []Discontinue therapy due to lack of appreciable progress towards goals  []Discontinue therapy due to lack of attendance or compliance  []Await Physician's recommendations/decisions regarding therapy  []Other:________________________________________________________________    Thank you for this referral.   Aneesh Lemon, PTA 11/19/2021 11:22 AM  [facility] L[facility]  NOTE TO PHYSICIAN:  PLEASE COMPLETE THE ORDERS BELOW AND   FAX TO [facility]: [phone]  If you are unable to process this request in 24 hours please contact our office:   [phone]  []  I have read the above report and request that my patient continue as recommended. []  I have read the above report and request that my patient continue therapy with the following changes/special instructions:________________________________________  []I have read the above report and request that my patient be discharged from therapy.     Physician's Signature:____________Date:_________TIME:________     Gloria Brown MD  ** Signature, Date and Time must be completed for valid certification **

## 2021-11-22 ENCOUNTER — HOSPITAL ENCOUNTER (OUTPATIENT)
Dept: PHYSICAL THERAPY | Age: 61
Discharge: HOME | End: 2021-11-22
Attending: ORTHOPAEDIC SURGERY
Payer: COMMERCIAL

## 2021-11-22 PROCEDURE — 97110 THERAPEUTIC EXERCISES: CPT

## 2021-11-22 PROCEDURE — 97014 ELECTRIC STIMULATION THERAPY: CPT

## 2021-11-22 PROCEDURE — 97140 MANUAL THERAPY 1/> REGIONS: CPT

## 2021-11-22 NOTE — PROGRESS NOTES
PT DAILY TREATMENT NOTE     Patient Name: Vesna Dunlap.   Date:2021  : 1960  [x]  Patient  Verified  Payor: BLUE CROSS / Plan: Karan [employer]ameed [id number] PPO / Product Type: PPO /    In time:930  Out time:1020  Total Treatment Time (min): 50  Visit #: 1 of 10    Medicare/BCBS Only   Total Timed Codes (min):  50 1:1 Treatment Time:  40       Treatment Area: Pain in left shoulder [M25.512]  Strain of muscle(s) and tendon(s) of the rotator cuff of left shoulder, subsequent encounter [S46.012D]    SUBJECTIVE  Pain Level (0-10 scale): 4  Any medication changes, allergies to medications, adverse drug reactions, diagnosis change, or new procedure performed?: [x] No    [] Yes (see summary sheet for update)  Subjective functional status/changes:   [] No changes reported  Patient reported no increase in pain since last visit    OBJECTIVE    Modality rationale: decrease inflammation and decrease pain to improve the patients ability to perform ADLs   Min Type Additional Details   10 [x] Estim:  [x]Unatt       [x]IFC  []Premod                        []Other:  [x]w/ice   []w/heat  Position:seated  Location:left shoulder    [] Estim: []Att    []TENS instruct  []NMES                    []Other:  []w/US   []w/ice   []w/heat  Position:  Location:    []  Traction: [] Cervical       []Lumbar                       [] Prone          []Supine                       []Intermittent   []Continuous Lbs:  [] before manual  [] after manual    []  Ultrasound: []Continuous   [] Pulsed                           []1MHz   []3MHz W/cm2:  Location:    []  Iontophoresis with dexamethasone         Location: [] Take home patch   [] In clinic    []  Ice     []  heat  []  Ice massage  []  Laser   []  Anodyne Position:  Location:    []  Laser with stim  []  Other:  Position:  Location:    []  Vasopneumatic Device    []  Right     []  Left  Pre-treatment girth:  Post-treatment girth:  Measured at (location):  Pressure:       [] lo [] med [] hi   Temperature: [] lo [] med [] hi   [] Skin assessment post-treatment:  []intact []redness- no adverse reaction    []redness - adverse reaction:       25 min Therapeutic Exercise:  [x] See flow sheet :   Rationale: increase ROM and increase strength to improve the patients ability to perform      15 min Manual Therapy: left shoulder PROM in all planes with overpressure into end range stretch, GHJ posterior and inferior joint mobs, STJ joint mobs and TPR let UT/LS and pec   The manual therapy interventions were performed at a separate and distinct time from the therapeutic activities interventions. Rationale: decrease pain, increase ROM and increase tissue extensibility to perform ADLs              With   [] TE   [] TA   [] neuro   [] other: Patient Education: [x] Review HEP    [] Progressed/Changed HEP based on:   [] positioning   [] body mechanics   [] transfers   [] heat/ice application    [] other:      Other Objective/Functional Measures:   - Progressed therex per flow sheet: wand abd and flex 2 sets of 10        Pain Level (0-10 scale) post treatment: 3    ASSESSMENT/Changes in Function: patient continues to slowly progress towards improved AROM but is limited at ROM above 90 degs. Patient will continue to benefit from skilled PT services to modify and progress therapeutic interventions, address functional mobility deficits, address ROM deficits, address strength deficits, analyze and address soft tissue restrictions and analyze and cue movement patterns to attain remaining goals. [x]  See Plan of Care  []  See progress note/recertification  []  See Discharge Summary         Progress towards goals / Updated goals:  Updated Goals: to be achieved in 10 treatments:  1.  Pt to tolerate 30 min or more of TE and/or Interventions w/o increased s/s             Current Status:progressing, slight increased pain post treatment 11/19/21    2. Pt will demonstrate AROM to 110 degs of left shoulder flex and ABD to improve left shoulder function for progress to normal elevation            Current Status: met, current AROM left shoulder flex 72 degs and ABD 65 degs (11/19/21)  3.  Pt will perform cone stack above shoulder level to perform usual work tasks              Current Status met at shoulder height (11/19/21)  4.  Pt will improve FOTO score to 70 in 22 visits to show significant improvement in function for progress to good shoulder function with daily activity            Current Status: progressing, FOTO 49  (11/19/21)    PLAN  []  Upgrade activities as tolerated     [x]  Continue plan of care  []  Update interventions per flow sheet       []  Discharge due to:_  []  Other:_      Rosendo Shaffer PTA 11/22/2021  9:41 AM    Future Appointments   Date Time Provider Billie Turcios   11/24/2021  9:30 AM Meaghan Meredith PT [facility]   11/29/2021  9:30 AM Theron Tolliver PTA [facility]   12/3/2021  8:15 AM Mahnaz Gibbs MD [facility]

## 2021-11-24 ENCOUNTER — APPOINTMENT (OUTPATIENT)
Dept: PHYSICAL THERAPY | Age: 61
End: 2021-11-24
Attending: ORTHOPAEDIC SURGERY
Payer: COMMERCIAL

## 2021-11-29 ENCOUNTER — HOSPITAL ENCOUNTER (OUTPATIENT)
Dept: PHYSICAL THERAPY | Age: 61
Discharge: HOME | End: 2021-11-29
Attending: ORTHOPAEDIC SURGERY
Payer: COMMERCIAL

## 2021-11-29 PROCEDURE — 97110 THERAPEUTIC EXERCISES: CPT

## 2021-11-29 PROCEDURE — 97140 MANUAL THERAPY 1/> REGIONS: CPT

## 2021-11-29 PROCEDURE — 97014 ELECTRIC STIMULATION THERAPY: CPT

## 2021-11-29 NOTE — PROGRESS NOTES
PT DAILY TREATMENT NOTE     Patient Name: Stephanie Teresa.   Date:2021  : 1960  [x]  Patient  Verified  Payor: BLUE CROSS / Plan: [employer]ameed [id number] PPO / Product Type: PPO /    In time:928  Out time:1015  Total Treatment Time (min): 52  Visit #: 2 of 10    Medicare/BCBS Only   Total Timed Codes (min):  47 1:1 Treatment Time:  37       Treatment Area: Pain in left shoulder [M25.512]  Strain of muscle(s) and tendon(s) of the rotator cuff of left shoulder, subsequent encounter [S46.012D]    SUBJECTIVE  Pain Level (0-10 scale): 3  Any medication changes, allergies to medications, adverse drug reactions, diagnosis change, or new procedure performed?: [x] No    [] Yes (see summary sheet for update)  Subjective functional status/changes:   [] No changes reported  Patient reported feeling less stiff but still achy pain in left shoulder    OBJECTIVE    Modality rationale: decrease inflammation and decrease pain to improve the patients ability to perform ADLs   Min Type Additional Details   10 [x] Estim:  [x]Unatt       [x]IFC  []Premod                        []Other:  [x]w/ice   []w/heat  Position: seated  Location: left shoulder    [] Estim: []Att    []TENS instruct  []NMES                    []Other:  []w/US   []w/ice   []w/heat  Position:  Location:    []  Traction: [] Cervical       []Lumbar                       [] Prone          []Supine                       []Intermittent   []Continuous Lbs:  [] before manual  [] after manual    []  Ultrasound: []Continuous   [] Pulsed                           []1MHz   []3MHz W/cm2:  Location:    []  Iontophoresis with dexamethasone         Location: [] Take home patch   [] In clinic    []  Ice     []  heat  []  Ice massage  []  Laser   []  Anodyne Position:  Location:    []  Laser with stim  []  Other:  Position:  Location:    []  Vasopneumatic Device    []  Right     []  Left  Pre-treatment girth:  Post-treatment girth:  Measured at (location): Pressure:       [] lo [] med [] hi   Temperature: [] lo [] med [] hi   [] Skin assessment post-treatment:  []intact []redness- no adverse reaction    []redness - adverse reaction:         22 min Therapeutic Exercise:  [x] See flow sheet :   Rationale: increase ROM and increase strength to improve the patients ability to perform ADLs      15 min Manual Therapy:  Manual Therapy: left shoulder PROM in all planes with overpressure into end range stretch, GHJ posterior and inferior joint mobs, STJ joint and TPR let UT/LS and pec   The manual therapy interventions were performed at a separate and distinct time from the therapeutic activities interventions. Rationale: increase ROM, increase tissue extensibility and decrease trigger points to perform ADLs         With   [] TE   [] TA   [] neuro   [] other: Patient Education: [x] Review HEP    [] Progressed/Changed HEP based on:   [] positioning   [] body mechanics   [] transfers   [] heat/ice application    [] other:      Other Objective/Functional Measures:   - AAROM left shoulder flexion 96 degs, ABD 90 degs  - AROM left shoulder flexion 72 degs, ABD 65 degs     Pain Level (0-10 scale) post treatment: 3    ASSESSMENT/Changes in Function: patient continues to make slow but steady progress towards increased ROM of left shoulder and is now demonstrating independence with updated therex and HEP. Patient returns to MD 12/3/21. Patient may be ready to D/C to HEP if cleared byt MD.    Patient will continue to benefit from skilled PT services to modify and progress therapeutic interventions, address functional mobility deficits, address ROM deficits, address strength deficits, analyze and address soft tissue restrictions and analyze and cue movement patterns to attain remaining goals.      [x]  See Plan of Care  []  See progress note/recertification  []  See Discharge Summary         Progress towards goals / Updated goals:  Updated Goals: to be achieved in 10 treatments:  1. Pt to tolerate 30 min or more of TE and/or Interventions w/o increased s/s             Current Status:progressing, slight increased pain post treatment 11/29/21    2. Pt will demonstrate AROM to 110 degs of left shoulder flex and ABD to improve left shoulder function for progress to normal elevation            Current Status: no change, current AROM left shoulder flex 72 degs and ABD 65 degs (11/29/21)  3.  Pt will perform cone stack above shoulder level to perform usual work tasks              Current Status met at shoulder height (11/29/21)  4.  Pt will improve FOTO score to 70 in 22 visits to show significant improvement in function for progress to good shoulder function with daily activity            Current Status: progressing, FOTO 49  (11/19/21)       PLAN  [x]  Upgrade activities as tolerated     [x]  Continue plan of care  []  Update interventions per flow sheet       []  Discharge due to:_  []  Other:_      Marielena Regan, DAYSI 11/29/2021  9:33 AM    Future Appointments   Date Time Provider Billie Turcios   12/3/2021  8:15 AM Zackary Lester MD [facility]

## 2021-12-03 ENCOUNTER — OFFICE VISIT (OUTPATIENT)
Dept: ORTHOPEDIC SURGERY | Age: 61
End: 2021-12-03
Payer: COMMERCIAL

## 2021-12-03 VITALS
BODY MASS INDEX: 24.8 KG/M2 | TEMPERATURE: 97.8 F | HEART RATE: 95 BPM | OXYGEN SATURATION: 99 % | WEIGHT: 158 LBS | HEIGHT: 67 IN

## 2021-12-03 DIAGNOSIS — M25.612 SHOULDER STIFFNESS, LEFT: ICD-10-CM

## 2021-12-03 DIAGNOSIS — S46.012D TRAUMATIC COMPLETE TEAR OF LEFT ROTATOR CUFF, SUBSEQUENT ENCOUNTER: Primary | ICD-10-CM

## 2021-12-03 PROCEDURE — 99213 OFFICE O/P EST LOW 20 MIN: CPT | Performed by: ORTHOPAEDIC SURGERY

## 2021-12-03 NOTE — PROGRESS NOTES
Patient: Neo Cooper MRN: [id number]       SSN: [id number]  YOB: 1960        AGE: 64 y.o. SEX: male  Body mass index is 24.75 kg/m². PCP: Jesusa Phalen, MD  12/03/21    Chief Complaint: Left shoulder follow up  Pain 3/10    HPI: Neo Cooper is a 64 y.o. male patient who returns to the office today for his left shoulder. He is now 3 and half months out from his left shoulder massive rotator cuff repair. He still has some stiffness as well as occasional pain. He says he is able to use the arm for activities below shoulder height below waist but above shoulder height he was unable. Past Medical History:   Diagnosis Date    Hypertension        History reviewed. No pertinent family history. Current Outpatient Medications   Medication Sig Dispense Refill    ergocalciferol (Vitamin D2) 1,250 mcg (50,000 unit) capsule Take 1 Capsule by mouth every seven (7) days. 12 Capsule 1    methocarbamoL (ROBAXIN) 500 mg tablet TAKE 1 TABLET BY MOUTH TWO (2) TIMES DAILY AS NEEDED FOR MUSCLE SPASM(S). 40 Tablet 1    diclofenac EC (VOLTAREN) 50 mg EC tablet Take 1 Tablet by mouth two (2) times a day. 40 Tablet 1    amLODIPine (NORVASC) 5 mg tablet Take 1 Tab by mouth daily. (Patient not taking: Reported on 6/9/2021) 30 Tab 5       No Known Allergies    Past Surgical History:   Procedure Laterality Date    SD ANESTH,SURGERY OF SHOULDER Left 08/12/2021    left shoulder A/S RCR BTS    SD COLONOSCOPY W/BIOPSY SINGLE/MULTIPLE  1-8-16    Dr. Antonio Nayak       Social History     Socioeconomic History    Marital status:      Spouse name: Not on file    Number of children: Not on file    Years of education: Not on file    Highest education level: Not on file   Occupational History    Not on file   Tobacco Use    Smoking status: Never Smoker    Smokeless tobacco: Never Used   Substance and Sexual Activity    Alcohol use:  Yes     Alcohol/week: 8.0 standard drinks     Types: 8 Cans of beer per week     Comment: weekends    Drug use: No    Sexual activity: Yes   Other Topics Concern    Not on file   Social History Narrative    Not on file     Social Determinants of Health     Financial Resource Strain:     Difficulty of Paying Living Expenses: Not on file   Food Insecurity:     Worried About Running Out of Food in the Last Year: Not on file    Irwin of Food in the Last Year: Not on file   Transportation Needs:     Lack of Transportation (Medical): Not on file    Lack of Transportation (Non-Medical): Not on file   Physical Activity:     Days of Exercise per Week: Not on file    Minutes of Exercise per Session: Not on file   Stress:     Feeling of Stress : Not on file   Social Connections:     Frequency of Communication with Friends and Family: Not on file    Frequency of Social Gatherings with Friends and Family: Not on file    Attends Restoration Services: Not on file    Active Member of [address] [place] [address] or Organizations: Not on file    Attends Club or Organization Meetings: Not on file    Marital Status: Not on file   Intimate Partner Violence:     Fear of Current or Ex-Partner: Not on file    Emotionally Abused: Not on file    Physically Abused: Not on file    Sexually Abused: Not on file   Housing Stability:     Unable to Pay for Housing in the Last Year: Not on file    Number of [place] in the Last Year: Not on file    Unstable Housing in the Last Year: Not on file       REVIEW OF SYSTEMS:      No changes from previous review of systems unless noted. PHYSICAL EXAMINATION:  Visit Vitals  Pulse 95   Temp 97.8 °F (36.6 °C) (Temporal)   Ht 5' 7\" (1.702 m)   Wt 158 lb (71.7 kg)   SpO2 99%   BMI 24.75 kg/m²     Body mass index is 24.75 kg/m². GENERAL: Alert and oriented x3, in no acute distress. HEENT: Normocephalic, atraumatic. RESP: Non labored breathing. SKIN: No rashes or lesions noted.    Left shoulder range of motion is forward flexion both active and passive to approximately 90 degrees. External rotation to 10 degrees. He does have stiffness with shoulder range of motion. IMAGING:  No x-rays today    ASSESSMENT & PLAN  Diagnosis: Status post left shoulder massive arthroscopic rotator cuff repair, 3 and half months    Pily Schwartz continues to have stiffness in his left shoulder. He needs to continue to work on that with physical therapy and home exercises and really push the stretching. I will see him back in approximately 1 month if his shoulder range of motion is still is poor we will have to consider additional procedure as he is not making the progress that I would like to see up to this point. He should continue physical therapy for now and hopefully his shoulder will loosen up over the next month. He will be kept out of work for another 6 weeks at this time. Electronically signed by: Ruthy Cordova MD    Note: This note was completed using voice recognition software.   Any typographical/name errors or mistakes are unintentional.

## 2021-12-03 NOTE — LETTER
NOTIFICATION RETURN TO WORK / SCHOOL    12/3/2021 8:21 AM    Mr. Jarrett Hunter. [address] [place] [phone]      To Whom It May Concern:    Jarrett Hunter. is currently under the care of [address] [place] [address] Jamarcus Gerber. He was seen in office today. He will remain out of work for the next 6 weeks. If there are questions or concerns please have the patient contact our office.         Sincerely,      Carmen Mccarthy MD

## 2021-12-13 ENCOUNTER — HOSPITAL ENCOUNTER (OUTPATIENT)
Dept: PHYSICAL THERAPY | Age: 61
Discharge: HOME | End: 2021-12-13
Attending: ORTHOPAEDIC SURGERY
Payer: COMMERCIAL

## 2021-12-13 PROCEDURE — 97140 MANUAL THERAPY 1/> REGIONS: CPT

## 2021-12-13 PROCEDURE — 97110 THERAPEUTIC EXERCISES: CPT

## 2021-12-13 NOTE — PROGRESS NOTES
PT DAILY TREATMENT NOTE     Patient Name: Janie Louis. Date:2021  : 1960  [x]  Patient  Verified  Payor: BLUE CROSS / Plan: Karan Hameed [id number] PPO / Product Type: PPO /    In DUT  Out IUOP:  Total Treatment Time (min): 46  Visit #: 3 of 10    Medicare/BCBS Only   Total Timed Codes (min):    1:1 Treatment Time:          Treatment Area: Pain in left shoulder [M25.512]  Strain of muscle(s) and tendon(s) of the rotator cuff of left shoulder, subsequent encounter [S46.012D]    SUBJECTIVE  Pain Level (0-10 scale): 4  Any medication changes, allergies to medications, adverse drug reactions, diagnosis change, or new procedure performed?: [x] No    [] Yes (see summary sheet for update)  Subjective functional status/changes:   [] No changes reported  Pt arrives with limited left shoulder mobility and guarding.     OBJECTIVE    Modality rationale: decrease inflammation and decrease pain to improve the patients ability to tolerate post treatment soreness   Min Type Additional Details    [] Estim:  []Unatt       []IFC  []Premod                        []Other:  []w/ice   []w/heat  Position:  Location:    [] Estim: []Att    []TENS instruct  []NMES                    []Other:  []w/US   []w/ice   []w/heat  Position:  Location:    []  Traction: [] Cervical       []Lumbar                       [] Prone          []Supine                       []Intermittent   []Continuous Lbs:  [] before manual  [] after manual    []  Ultrasound: []Continuous   [] Pulsed                           []1MHz   []3MHz W/cm2:  Location:    []  Iontophoresis with dexamethasone         Location: [] Take home patch   [] In clinic   10 [x]  Ice     []  heat  []  Ice massage  []  Laser   []  Anodyne Position: supine  Location: Left Shoulder/infraspinatus    []  Laser with stim  []  Other:  Position:  Location:    []  Vasopneumatic Device    []  Right     []  Left  Pre-treatment girth:  Post-treatment girth:  Measured at (location):  Pressure:       [] lo [] med [] hi   Temperature: [] lo [] med [] hi   [x] Skin assessment post-treatment:  [x]intact []redness- no adverse reaction    []redness - adverse reaction:     8 min Therapeutic Exercise:  [x] See flow sheet : Assess fo mobility and muscle condition   Rationale: increase ROM, increase strength and improve coordination to improve the patients ability to perform increased activity    34 min Manual Therapy:  Left SC joint mobs, LAD left UE, STM/DTM/TPR left pec/deltoids/subclavius/infraspinatus. [facility] joint mobs for elevation Grade 2-3 for mobility   The manual therapy interventions were performed at a separate and distinct time from the therapeutic activities interventions.   Rationale: increase ROM, increase tissue extensibility and decrease trigger points to improve daily activity and functional tolerance          With   [x] TE   [] TA   [] neuro   [] other: Patient Education: [x] Review HEP    [] Progressed/Changed HEP based on:   [] positioning   [] body mechanics   [] transfers   [] heat/ice application    [] other:      Other Objective/Functional Measures:   - Significant guarding and limited mobility of the left shoulder with pulleys and PROM  - AAROM Flx ~80* ABD ~ 50* with increased pain  - Limited PROM due to pain and guarding  - TTP and muscle tightness left pec/deltoids/infraspinatus/subclavius   - Decr mobility left SC joint     Pain Level (0-10 scale) post treatment: 3-4    ASSESSMENT/Changes in Function:   - Limited progress with a decline in function at this point  - Discussed return to at least twice a week with treatments to improved functional mobility  - Good response to treatment with improved left SC joint mobility after treatment  - - Improved PROM to 90* Flx/ABD after treatment of STM and joint mobs to aid with mobility    Patient will continue to benefit from skilled PT services to modify and progress therapeutic interventions, address functional mobility deficits, address ROM deficits, address strength deficits, analyze and address soft tissue restrictions and analyze and cue movement patterns to attain remaining goals.      []  See Plan of Care  []  See progress note/recertification  []  See Discharge Summary         Progress towards goals / Updated goals:  Updated Goals: to be achieved in 10 treatments:  1. Pt to tolerate 30 min or more of TE and/or Interventions w/o increased s/s             Current Status:progressing, slight increased pain post treatment 11/29/21    2. Pt will demonstrate AROM to 110 degs of left shoulder flex and ABD to improve left shoulder function for progress to normal elevation            Current Status: no change, current AROM left shoulder flex 72 degs and ABD 65 degs (11/29/21)  3.  Pt will perform cone stack above shoulder level to perform usual work tasks              Current Status met at shoulder height (11/29/21)  4.  Pt will improve FOTO score to 70 in 22 visits to show significant improvement in function for progress to good shoulder function with daily activity            Current Status: progressing, FOTO 49  (11/19/21)       PLAN  [x]  Upgrade activities as tolerated     [x]  Continue plan of care  []  Update interventions per flow sheet       []  Discharge due to:_  []  Other:_      [place] Part, PT 12/13/2021  9:43 AM    Future Appointments   Date Time Provider Billie Turcios   12/15/2021  9:30 AM Yanely Lee, PT [facility]   12/21/2021 10:15 AM Yanely Lee PT [facility]   12/23/2021  9:30 AM Sravani Pichardo, PTA [facility]   12/27/2021 10:15 AM Kelby Pan, PT [facility]   12/29/2021 10:15 AM Yanely Lee PT [facility]   1/14/2022  8:45 AM Amy Castañeda MD [facility] BS AMB

## 2021-12-14 ENCOUNTER — TELEPHONE (OUTPATIENT)
Dept: ORTHOPEDIC SURGERY | Age: 61
End: 2021-12-14

## 2021-12-15 ENCOUNTER — HOSPITAL ENCOUNTER (OUTPATIENT)
Dept: PHYSICAL THERAPY | Age: 61
Discharge: HOME | End: 2021-12-15
Attending: ORTHOPAEDIC SURGERY
Payer: COMMERCIAL

## 2021-12-15 PROCEDURE — 97140 MANUAL THERAPY 1/> REGIONS: CPT

## 2021-12-15 PROCEDURE — 97110 THERAPEUTIC EXERCISES: CPT

## 2021-12-15 NOTE — PROGRESS NOTES
PT DAILY TREATMENT NOTE     Patient Name: Varghese Rowe. Date:12/15/2021  : 1960  [x]  Patient  Verified  Payor: BLUE CROSS / Plan: Karan Hameed [id number] PPO / Product Type: PPO /    In time:930 Out time: 879  Total Treatment Time (min): 45  Visit #: 3 of 10    Medicare/BCBS Only   Total Timed Codes (min):    1:1 Treatment Time:          Treatment Area: Pain in left shoulder [M25.512]  Strain of muscle(s) and tendon(s) of the rotator cuff of left shoulder, subsequent encounter [S46.012D]    SUBJECTIVE  Pain Level (0-10 scale): 4  Any medication changes, allergies to medications, adverse drug reactions, diagnosis change, or new procedure performed?: [x] No    [] Yes (see summary sheet for update)  Subjective functional status/changes:   [] No changes reported  Pt arrives with limited left shoulder mobility and guarding.     OBJECTIVE    Modality rationale: decrease inflammation and decrease pain to improve the patients ability to tolerate post treatment soreness   Min Type Additional Details    [] Estim:  []Unatt       []IFC  []Premod                        []Other:  []w/ice   []w/heat  Position:  Location:    [] Estim: []Att    []TENS instruct  []NMES                    []Other:  []w/US   []w/ice   []w/heat  Position:  Location:    []  Traction: [] Cervical       []Lumbar                       [] Prone          []Supine                       []Intermittent   []Continuous Lbs:  [] before manual  [] after manual   10 [x]  Ultrasound: [x]Continuous   [] Pulsed                           []1MHz   [x]3MHz W/cm2: 1.3  Location: Left anterior shoulder/Pec/lateral and Post Deltoid    []  Iontophoresis with dexamethasone         Location: [] Take home patch   [] In clinic     []  Ice     []  heat  []  Ice massage  []  Laser   []  Anodyne Position:    Location:   Shoulder/infraspinatus    []  Laser with stim  []  Other:  Position:  Location:    []  Vasopneumatic Device    []  Right     [] Left  Pre-treatment girth:  Post-treatment girth:  Measured at (location):  Pressure:       [] lo [] med [] hi   Temperature: [] lo [] med [] hi   [x] Skin assessment post-treatment:  [x]intact []redness- no adverse reaction    []redness - adverse reaction:     8 min Therapeutic Exercise:  [x] See flow sheet : Assess fo mobility and muscle condition   Rationale: increase ROM, increase strength and improve coordination to improve the patients ability to perform increased activity    27 min Manual Therapy:  Left SC joint mobs, LAD left UE, STM/DTM/TPR left pec/deltoids/subclavius/infraspinatus. [facility] joint mobs for elevation Grade 2-3 for mobility, RI   The manual therapy interventions were performed at a separate and distinct time from the therapeutic activities interventions.   Rationale: increase ROM, increase tissue extensibility and decrease trigger points to improve daily activity and functional tolerance          With   [x] TE   [] TA   [] neuro   [] other: Patient Education: [x] Review HEP    [] Progressed/Changed HEP based on:   [] positioning   [] body mechanics   [] transfers   [] heat/ice application    [] other:      Other Objective/Functional Measures:   - AROM Left Shoulder Flx 64 ABD 45 Ext 15  - Limited mobility left shoulder with guarding  - TTP at the anterior left shoulder, post deltoid and pec      Pain Level (0-10 scale) post treatment: 4    ASSESSMENT/Changes in Function:   - Good progress with decreased muscle tightness of the left pec and infraspinatus  - Fair to good tolerance with manual therapy of the left shoulder with improved tissue mobility after treatment  - increased pain to 4/10 after treatment       Patient will continue to benefit from skilled PT services to modify and progress therapeutic interventions, address functional mobility deficits, address ROM deficits, address strength deficits, analyze and address soft tissue restrictions and analyze and cue movement patterns to attain remaining goals.      []  See Plan of Care  []  See progress note/recertification  []  See Discharge Summary         Progress towards goals / Updated goals:  Updated Goals: to be achieved in 10 treatments:  1. Pt to tolerate 30 min or more of TE and/or Interventions w/o increased s/s             Current Status:progressing, slight increased pain post treatment 12/15/21    2. Pt will demonstrate AROM to 110 degs of left shoulder flex and ABD to improve left shoulder function for progress to normal elevation            Current Status: no change, current AROM left shoulder flex 64 degs and ABD 45 degs today 12/15/21  3.  Pt will perform cone stack above shoulder level to perform usual work tasks              Current Status met at shoulder height (11/29/21)  4.  Pt will improve FOTO score to 70 in 22 visits to show significant improvement in function for progress to good shoulder function with daily activity            Current Status: progressing, FOTO 49  (11/19/21)       PLAN  [x]  Upgrade activities as tolerated     [x]  Continue plan of care  []  Update interventions per flow sheet       []  Discharge due to:_  []  Other:_      Haydee Alicea, PT 12/15/2021  9:43 AM    Future Appointments   Date Time Provider Billie Turcios   12/21/2021 10:15 AM Argenis Stringer, PT [facility]   12/23/2021  9:30 AM Thong Islas [facility]   12/27/2021 10:15 AM Monica Sawyer PT [facility]   12/29/2021 10:15 AM Argenis Stringer PT [facility]   1/14/2022  8:45 AM Malcom Bernheim, Concepcion Im, MD [facility] BS AMB

## 2021-12-21 ENCOUNTER — HOSPITAL ENCOUNTER (OUTPATIENT)
Dept: PHYSICAL THERAPY | Age: 61
Discharge: HOME | End: 2021-12-21
Attending: ORTHOPAEDIC SURGERY
Payer: COMMERCIAL

## 2021-12-21 PROCEDURE — 97110 THERAPEUTIC EXERCISES: CPT

## 2021-12-21 NOTE — PROGRESS NOTES
PT DAILY TREATMENT NOTE     Patient Name: Anuja Hoover. Date:2021  : 1960  [x]  Patient  Verified  Payor: BLUE DEVORA / Plan: Karan Hameed [id number] PPO / Product Type: PPO /    In MRXV:[id number]  Out time:1025  Total Treatment Time (min): 40  Visit #: 4 of 10    Medicare/[facility] Only   Total Timed Codes (min):  40 1:1 Treatment Time:  40       Treatment Area: Pain in left shoulder [M25.512]  Strain of muscle(s) and tendon(s) of the rotator cuff of left shoulder, subsequent encounter [S46.012D]    SUBJECTIVE  Pain Level (0-10 scale): 4  Any medication changes, allergies to medications, adverse drug reactions, diagnosis change, or new procedure performed?: [x] No    [] Yes (see summary sheet for update)  Subjective functional status/changes:   [] No changes reported  Just feels stiff. Less pain after last visit. Main pain is at night    OBJECTIVE        40 min Therapeutic Exercise:  [x] See flow sheet :   Rationale: increase ROM, increase strength and improve coordination to improve the patients ability to improve daily activity          With   [x] TE   [] TA   [] neuro   [] other: Patient Education: [x] Review HEP    [] Progressed/Changed HEP based on:   [] positioning   [] body mechanics   [] transfers   [] heat/ice application    [] other:      Other Objective/Functional Measures:   - Initiate Supine Wand for Flx/Scap with 1# Bar     Pain Level (0-10 scale) post treatment: 4    ASSESSMENT/Changes in Function:   - Good exercise participation and good tolerance with added time on pulley and wand Flx/Scap resisted training        Patient will continue to benefit from skilled PT services to modify and progress therapeutic interventions, address functional mobility deficits, address ROM deficits, address strength deficits, analyze and address soft tissue restrictions and analyze and cue movement patterns to attain remaining goals.      []  See Plan of Care  []  See progress note/recertification  []  See Discharge Summary         Progress towards goals / Updated goals:  Updated Goals: to be achieved in 10 treatments:  1. Pt to tolerate 30 min or more of TE and/or Interventions w/o increased s/s             Current Status:progressing, slight increased pain post treatment 12/15/21    2. Pt will demonstrate AROM to 110 degs of left shoulder flex and ABD to improve left shoulder function for progress to normal elevation            Current Status: no change, current AROM left shoulder flex 64 degs and ABD 45 degs today 12/15/21  3.  Pt will perform cone stack above shoulder level to perform usual work tasks              Current Status met at above shoulder level 12/21/21  4.  Pt will improve FOTO score to 70 in 22 visits to show significant improvement in function for progress to good shoulder function with daily activity            Current Status: progressing, FOTO 49  (11/19/21)    PLAN  [x]  Upgrade activities as tolerated     [x]  Continue plan of care  []  Update interventions per flow sheet       []  Discharge due to:_  []  Other:_      Mane Durand, PT 12/21/2021  9:46 AM    Future Appointments   Date Time Provider Billie Turcios   12/21/2021 10:15 AM Paul Flatter, PT [facility]   12/23/2021  9:30 AM Paul Flatter, PT [facility]   12/27/2021 10:15 AM Elizabeth Naranjo, PT [facility]   12/29/2021 10:15 AM Paul Flatter, PT [facility]   1/14/2022  8:45 AM Lawson Webster MD [facility] BS AMB

## 2021-12-23 ENCOUNTER — HOSPITAL ENCOUNTER (OUTPATIENT)
Dept: PHYSICAL THERAPY | Age: 61
Discharge: HOME | End: 2021-12-23
Attending: ORTHOPAEDIC SURGERY
Payer: COMMERCIAL

## 2021-12-23 PROCEDURE — 97035 APP MDLTY 1+ULTRASOUND EA 15: CPT

## 2021-12-23 PROCEDURE — 97140 MANUAL THERAPY 1/> REGIONS: CPT

## 2021-12-23 PROCEDURE — 97014 ELECTRIC STIMULATION THERAPY: CPT

## 2021-12-23 PROCEDURE — 97110 THERAPEUTIC EXERCISES: CPT

## 2021-12-23 NOTE — PROGRESS NOTES
PT DAILY TREATMENT NOTE     Patient Name: Rosa Edwards.   Date:2021  : 1960  [x]  Patient  Verified  Payor: BLUE CROSS / Plan: Karan Hameed [id number] PPO / Product Type: PPO /    In time:932  Out time:1033  Total Treatment Time (min): 61  Visit #: 5 of 10    Medicare/BCBS Only   Total Timed Codes (min):  61 1:1 Treatment Time:  51       Treatment Area: Pain in left shoulder [M25.512]  Strain of muscle(s) and tendon(s) of the rotator cuff of left shoulder, subsequent encounter [S46.284D]    SUBJECTIVE  Pain Level (0-10 scale): 4  Any medication changes, allergies to medications, adverse drug reactions, diagnosis change, or new procedure performed?: [x] No    [] Yes (see summary sheet for update)  Subjective functional status/changes:   [] No changes reported  Patient reported continued soreness in the mornings and evenings    OBJECTIVE    Modality rationale: decrease inflammation and decrease pain to improve the patients ability to perform ADLs   Min Type Additional Details   10 [x] Estim:  []Unatt       [x]IFC  []Premod                        []Other:  [x]w/ice   []w/heat  Position:seated  Location: left shoulder    [] Estim: []Att    []TENS instruct  []NMES                    []Other:  []w/US   []w/ice   []w/heat  Position:  Location:    []  Traction: [] Cervical       []Lumbar                       [] Prone          []Supine                       []Intermittent   []Continuous Lbs:  [] before manual  [] after manual   10 [x]  Ultrasound: [x]Continuous   [] Pulsed                           [x]1MHz   []3MHz W/cm2: 1.5  Location: left anterior capsule and pec while on abd and ER stretch    []  Iontophoresis with dexamethasone         Location: [] Take home patch   [] In clinic    []  Ice     []  heat  []  Ice massage  []  Laser   []  Anodyne Position:  Location:    []  Laser with stim  []  Other:  Position:  Location:    []  Vasopneumatic Device    []  Right     []  Left  Pre-treatment girth:  Post-treatment girth:  Measured at (location):  Pressure:       [] lo [] med [] hi   Temperature: [] lo [] med [] hi   [] Skin assessment post-treatment:  []intact []redness- no adverse reaction    []redness - adverse reaction:         26 min Therapeutic Exercise:  [x] See flow sheet :   Rationale: increase ROM and increase strength to improve the patients ability to perform ADLs      15 min Manual Therapy:  Left inferior GHJ mobs with shoulder in 45 degs of ABD and then as performing PROM into flex and scaption, STM/DTM left pec and bicep   The manual therapy interventions were performed at a separate and distinct time from the therapeutic activities interventions. Rationale: decrease pain and increase ROM to perform ADLs              With   [] TE   [] TA   [] neuro   [] other: Patient Education: [x] Review HEP    [] Progressed/Changed HEP based on:   [] positioning   [] body mechanics   [] transfers   [] heat/ice application    [] other:      Other Objective/Functional Measures:   FOTO 49  Improved arthrokinematics depressing shoulder when initiating flex after manual and cuing. Added bent shoulder ext, rows and tricep kick backs x 10 reps ea       Pain Level (0-10 scale) post treatment: 4    ASSESSMENT/Changes in Function: improved PROM into flex with assisted [address] depression and after US with left shoulder on stretch. Patient tolerated progressed therex with no c/o increased pain post treatment. Patient will continue to benefit from skilled PT services to modify and progress therapeutic interventions, address functional mobility deficits, address ROM deficits, address strength deficits, analyze and address soft tissue restrictions and analyze and cue movement patterns to attain remaining goals.      []  See Plan of Care  []  See progress note/recertification  []  See Discharge Summary         Progress towards goals / Updated goals:  Updated Goals: to be achieved in 10 treatments:  1. Pt to tolerate 30 min or more of TE and/or Interventions w/o increased s/s             Current Status:progressing, slight increased pain post treatment 12/15/21    2. Pt will demonstrate AROM to 110 degs of left shoulder flex and ABD to improve left shoulder function for progress to normal elevation            Current Status: no change, current AROM left shoulder flex 64 degs and ABD 45 degs today 12/15/21  3.  Pt will perform cone stack above shoulder level to perform usual work tasks              Current Status met at above shoulder level 12/21/21  4.  Pt will improve FOTO score to 70 in 22 visits to show significant improvement in function for progress to good shoulder function with daily activity            Current Status: unchanged since last assessed (11/19/21), FOTO 49  (12/23/21)       PLAN  [x]  Upgrade activities as tolerated     [x]  Continue plan of care  []  Update interventions per flow sheet       []  Discharge due to:_  []  Other:_      Antione Johnson, DAYSI 12/23/2021  9:45 AM    Future Appointments   Date Time Provider Billie Turcios   12/27/2021 10:15 AM Celia Paget, PT [facility]   12/29/2021 10:15 AM Moy Davalos, PT [facility]   1/14/2022  8:45 AM Gorge Gold MD [facility]

## 2021-12-27 ENCOUNTER — APPOINTMENT (OUTPATIENT)
Dept: PHYSICAL THERAPY | Age: 61
End: 2021-12-27
Attending: ORTHOPAEDIC SURGERY
Payer: COMMERCIAL

## 2021-12-29 ENCOUNTER — HOSPITAL ENCOUNTER (OUTPATIENT)
Dept: PHYSICAL THERAPY | Age: 61
Discharge: HOME | End: 2021-12-29
Attending: ORTHOPAEDIC SURGERY
Payer: COMMERCIAL

## 2021-12-29 PROCEDURE — 97140 MANUAL THERAPY 1/> REGIONS: CPT

## 2021-12-29 PROCEDURE — 97110 THERAPEUTIC EXERCISES: CPT

## 2021-12-29 NOTE — PROGRESS NOTES
PT DAILY TREATMENT NOTE     Patient Name: Lizy Marie.   Date:2021  : 1960  [x]  Patient  Verified  Payor: BLUE CROSS / Plan: Karan Hameed [id number] PPO / Product Type: PPO /    In time:1018  Out time:1120  Total Treatment Time (min): 62  Visit #: 1 of 10    Medicare/BCBS Only   Total Timed Codes (min):  62 1:1 Treatment Time:  62       Treatment Area: Pain in left shoulder [M25.512]  Strain of muscle(s) and tendon(s) of the rotator cuff of left shoulder, subsequent encounter [S46.871D]    SUBJECTIVE  Pain Level (0-10 scale): 5 with mobility  Any medication changes, allergies to medications, adverse drug reactions, diagnosis change, or new procedure performed?: [x] No    [] Yes (see summary sheet for update)  Subjective functional status/changes:   [] No changes reported  Pain at night and in the morning    OBJECTIVE    Modality rationale: decrease inflammation, decrease pain and increase tissue extensibility to improve the patients ability to improve functional mobility   Min Type Additional Details    [] Estim:  []Unatt       []IFC  []Premod                        []Other:  []w/ice   []w/heat  Position:  Location:    [] Estim: []Att    []TENS instruct  []NMES                    []Other:  []w/US   []w/ice   []w/heat  Position:  Location:    []  Traction: [] Cervical       []Lumbar                       [] Prone          []Supine                       []Intermittent   []Continuous Lbs:  [] before manual  [] after manual   8 [x]  Ultrasound: [x]Continuous   [] Pulsed                           [x]1MHz   []3MHz W/cm2: 1.7  Location: Left infraspinatus and anterior deltoid    []  Iontophoresis with dexamethasone         Location: [] Take home patch   [] In clinic    []  Ice     []  heat  []  Ice massage  []  Laser   []  Anodyne Position:  Location:    []  Laser with stim  []  Other:  Position:  Location:    []  Vasopneumatic Device    []  Right     []  Left  Pre-treatment girth:  Post-treatment girth:  Measured at (location):  Pressure:       [] lo [] med [] hi   Temperature: [] lo [] med [] hi   [x] Skin assessment post-treatment:  [x]intact []redness- no adverse reaction    []redness - adverse reaction:     43 min Therapeutic Exercise:  [x] See flow sheet :   Rationale: increase ROM, increase strength and improve coordination to improve the patients ability to improve daily function    11 min Manual Therapy:  STM/DTM/TPR left infraspinatus/anterior deltoid   The manual therapy interventions were performed at a separate and distinct time from the therapeutic activities interventions. Rationale: decrease pain, increase ROM, increase tissue extensibility and decrease trigger points to improve functional mobility          With   [x] TE   [] TA   [] neuro   [] other: Patient Education: [x] Review HEP    [] Progressed/Changed HEP based on:   [] positioning   [] body mechanics   [] transfers   [] heat/ice application    [] other:      Other Objective/Functional Measures:  - Tight GH Jt  - TTP left Infraspinatus   - AROM Left Shoulder Flx 61 ABD 58 Scap 66   - PROM Flx 98 ABD 84 Scap 92  - AAROM with Dowel Flx 103* Scap 92      Pain Level (0-10 scale) post treatment: 5 with motion    ASSESSMENT/Changes in Function:   - Improved mobility today as noted above  - Continued GH joint tightness and limited mobility    Patient will continue to benefit from skilled PT services to modify and progress therapeutic interventions, address functional mobility deficits, address ROM deficits, address strength deficits, analyze and address soft tissue restrictions and analyze and cue movement patterns to attain remaining goals.      []  See Plan of Care  [x]  See progress note/recertification  []  See Discharge Summary         Progress towards goals / Updated goals:  Updated Goals: to be achieved in 10 treatments:  1. Pt to tolerate 30 min or more of TE and/or Interventions w/o increased s/s             Current Status:progressing, slight increased pain post treatment    2. Pt will demonstrate AROM to 110 degs of left shoulder flex and ABD to improve left shoulder function for progress to normal elevation            Current Status: no change, current AROM left shoulder flex 61 degs and ABD 58 degs 12/29/21   3.  Pt will perform cone stack above shoulder level to perform usual work tasks              Current Status:  Previously Met but mobility has declined for AROM  4.  Pt will improve FOTO score to 70 in 22 visits to show significant improvement in function for progress to good shoulder function with daily activity            Current Status:FOTO = 49    PLAN  [x]  Upgrade activities as tolerated     [x]  Continue plan of care  []  Update interventions per flow sheet       []  Discharge due to:_  []  Other:_      Patt Oliveira PT 12/29/2021  10:39 AM    Future Appointments   Date Time Provider Billie Turcios   1/14/2022  8:45 AM Reny Bellamy MD [facility]

## 2021-12-30 NOTE — PROGRESS NOTES
[facility] at [address]  [address]  Phone: [phone]      Fax:  [phone]    Progress Note  Patient name: Karen Galindo. Start of Care: 2021   Referral source: Bing Souza MD : 1960      Patient name: Karen Galindo. Start of Care: 2021   Referral source: Bing Souza MD : 1960               Medical Diagnosis: Pain in left shoulder [M25.512]  Strain of muscle(s) and tendon(s) of the rotator cuff of left shoulder, subsequent encounter [R57.227X]  Payor: BLUE CROSS / Plan: Karan Hameed [id number] PPO / Product Type: PPO /  Onset Date:21               Treatment Diagnosis: Left ShoulderPain   Prior Hospitalization: see medical history Provider#: [id number]   Medications: Verified on Patient summary List   Comorbidities: OA  Prior Level of Function: Able to use left arm for work and usual activity      Visits from Start of Care: 21    Missed Visits: 0        Key Functional Changes:  Patient has shown a decline in shoulder mobility and function since visit on 21. Patient arrived that day in increased pain, guarding and decreased mobility with unknown reason. Since that time, he has made small gains with assisted mobility showing more ROM but still has difficulty with AROM With shoulder Flx at 61* and ABD at 58* today. AAROM with Dowel has improved to 103* of shoulder Flx. He continues to have a tight [address] joint and get muscle tightness and multiple TrPs at the left infraspinatus and anterior deltoid, resulting in lateal arm pain with mobility.     Patient continues to struggle with reduced functional AROM and strength at tihis time and will continue to benefit from skilled PT services to modify and progress therapeutic interventions, address functional mobility deficits, address ROM deficits, address strength deficits, analyze and address soft tissue restrictions and analyze and cue movement patterns to attain remaining goals. Other Objective/Functional Measures:  - Tight GH Jt  - TTP left Infraspinatus   - AROM Left Shoulder Flx 61 ABD 58 Scap 66   - PROM Flx 98 ABD 84 Scap 92  - AAROM with Dowel Flx 103* Scap 92           Progress towards goals / Updated goals:  Updated Goals: to be achieved in 10 treatments:  1. Pt to tolerate 30 min or more of TE and/or Interventions w/o increased s/s             Current Status:progressing, slight increased pain post treatment    2. Pt will demonstrate AROM to 110 degs of left shoulder flex and ABD to improve left shoulder function for progress to normal elevation            Current Status: no change, current AROM left shoulder flex 61 degs and ABD 58 degs   3.  Pt will perform cone stack above shoulder level to perform usual work tasks              Current Status:  Previously Met but mobility has declined for AROM  4.  Pt will improve FOTO score to 70 in 22 visits to show significant improvement in function for progress to good shoulder function with daily activity            Current Status:FOTO = 49    Updated Goals: to be achieved in 10 treatments:  Continue with current goals     ASSESSMENT/RECOMMENDATIONS:  [x]Continue therapy per initial plan/protocol at a frequency of  2 x per week for 10 visits  []Continue therapy with the following recommended changes:_____________________      _____________________________________________________________________  []Discontinue therapy progressing towards or have reached established goals  []Discontinue therapy due to lack of appreciable progress towards goals  []Discontinue therapy due to lack of attendance or compliance  []Await Physician's recommendations/decisions regarding therapy  []Other:________________________________________________________________    Thank you for this referral.   Gennaro Goodson, PT 12/29/21 11:22 AM  Otilio Caro, LALA  NOTE TO PHYSICIAN:  PLEASE COMPLETE THE ORDERS BELOW AND   [facility]: [phone]  If you are unable to process this request in 24 hours please contact our office:   [phone]  []  I have read the above report and request that my patient continue as recommended. []  I have read the above report and request that my patient continue therapy with the following changes/special instructions:________________________________________  []I have read the above report and request that my patient be discharged from therapy.     Physician's Signature:____________Date:_________TIME:________     Noé Lewis MD  ** Signature, Date and Time must be completed for valid certification **

## 2022-01-03 ENCOUNTER — TELEPHONE (OUTPATIENT)
Dept: PHYSICAL THERAPY | Age: 62
End: 2022-01-03

## 2022-01-05 ENCOUNTER — HOSPITAL ENCOUNTER (OUTPATIENT)
Dept: PHYSICAL THERAPY | Age: 62
Discharge: HOME | End: 2022-01-05
Attending: ORTHOPAEDIC SURGERY
Payer: COMMERCIAL

## 2022-01-05 PROCEDURE — 97140 MANUAL THERAPY 1/> REGIONS: CPT

## 2022-01-05 PROCEDURE — 97110 THERAPEUTIC EXERCISES: CPT

## 2022-01-05 NOTE — PROGRESS NOTES
PT DAILY TREATMENT NOTE     Patient Name: Barbara Robins.   Date:2022  : 1960  [x]  Patient  Verified  Payor: BLUE CROSS / Plan: Karan Hameed [id number] PPO / Product Type: PPO /    In time:1018  Out time:1126  Total Treatment Time (min): 6  Visit #: 2 of 10    Medicare/BCBS Only   Total Timed Codes (min):  56 1:1 Treatment Time:  56       Treatment Area: Pain in left shoulder [M25.512]  Strain of muscle(s) and tendon(s) of the rotator cuff of left shoulder, subsequent encounter [S46.012D]    SUBJECTIVE  Pain Level (0-10 scale): 3  Any medication changes, allergies to medications, adverse drug reactions, diagnosis change, or new procedure performed?: [x] No    [] Yes (see summary sheet for update)  Subjective functional status/changes:   [] No changes reported  Feeling better today    OBJECTIVE    Modality rationale: decrease inflammation, decrease pain and increase tissue extensibility to improve the patients ability to improve functional mobility   Min Type Additional Details    [] Estim:  []Unatt       []IFC  []Premod                        []Other:  []w/ice   []w/heat  Position:  Location:    [] Estim: []Att    []TENS instruct  []NMES                    []Other:  []w/US   []w/ice   []w/heat  Position:  Location:    []  Traction: [] Cervical       []Lumbar                       [] Prone          []Supine                       []Intermittent   []Continuous Lbs:  [] before manual  [] after manual     []  Ultrasound: []Continuous   [] Pulsed                           []1MHz   []3MHz W/cm2:  Location:     []  Iontophoresis with dexamethasone         Location: [] Take home patch   [] In clinic   10 [x]  Ice     []  heat  []  Ice massage  []  Laser   []  Anodyne Position: Supine  Location: Left shoulder/infraspinatus    []  Laser with stim  []  Other:  Position:  Location:    []  Vasopneumatic Device    []  Right     []  Left  Pre-treatment girth:  Post-treatment girth:  Measured at (location): Pressure:       [] lo [] med [] hi   Temperature: [] lo [] med [] hi   [x] Skin assessment post-treatment:  [x]intact []redness- no adverse reaction    []redness - adverse reaction:     45 min Therapeutic Exercise:  [x] See flow sheet :   Rationale: increase ROM, increase strength and improve coordination to improve the patients ability to improve daily function    11 min Manual Therapy:  STM/DTM/TPR left infraspinatus/anterior deltoid in right S/L   The manual therapy interventions were performed at a separate and distinct time from the therapeutic activities interventions. Rationale: decrease pain, increase ROM, increase tissue extensibility and decrease trigger points to improve functional mobility          With   [x] TE   [] TA   [] neuro   [] other: Patient Education: [x] Review HEP    [] Progressed/Changed HEP based on:   [] positioning   [] body mechanics   [] transfers   [] heat/ice application    [] other:      Other Objective/Functional Measures:  - Tight GH Jt  - TTP left Infraspinatus/lateral triceps   - AROM in supine Left Shoulder Flx 78   - AROM standing Flx 61 ABD 59           Pain Level (0-10 scale) post treatment: 5     ASSESSMENT/Changes in Function:   - Improved mobility today with increased ease of motion  - Primary limitations in PROM with abduction today due to added pain at the lateral arm  - Continued GH joint tightness with limited mobility    Patient will continue to benefit from skilled PT services to modify and progress therapeutic interventions, address functional mobility deficits, address ROM deficits, address strength deficits, analyze and address soft tissue restrictions and analyze and cue movement patterns to attain remaining goals.      []  See Plan of Care  [x]  See progress note/recertification  []  See Discharge Summary         Progress towards goals / Updated goals:  Updated Goals: to be achieved in 10 treatments:  1. Pt to tolerate 30 min or more of TE and/or Interventions w/o increased s/s             Current Status:progressing, slight increased pain post treatment 1/5/22   2. Pt will demonstrate AROM to 110 degs of left shoulder flex and ABD to improve left shoulder function for progress to normal elevation            Current Status:Progressing at Flx 61* ABD 59* in standing 1/5/22  3.  Pt will perform cone stack above shoulder level to perform usual work tasks              Current Status:  Improved to shoulder level today 1/5/22  4.  Pt will improve FOTO score to 70 in 22 visits to show significant improvement in function for progress to good shoulder function with daily activity            Current Status:FOTO = 49    PLAN  [x]  Upgrade activities as tolerated     [x]  Continue plan of care  []  Update interventions per flow sheet       []  Discharge due to:_  []  Other:_      Tana Corea, PT 1/5/2022  10:20 AM    Future Appointments   Date Time Provider Billie Turcios   1/10/2022  8:45 AM Timothy Camacho, PT [facility]   1/12/2022 10:15 AM Timothy Camacho, PT [facility]   1/14/2022  8:45 AM Ryan Ochoa MD [facility]   1/17/2022  9:30 AM Klarissa MCGHEE, PT [facility]   1/19/2022 10:15 AM Timothy Camacho, PT [facility]   1/24/2022  8:45 AM Timothy Camacho, PT [facility]   1/26/2022 10:15 AM Timothy Camacho, PT [facility]   1/31/2022  8:45 AM Timothy Camacho, PT [facility]

## 2022-01-10 ENCOUNTER — HOSPITAL ENCOUNTER (OUTPATIENT)
Dept: PHYSICAL THERAPY | Age: 62
Discharge: HOME | End: 2022-01-10
Attending: ORTHOPAEDIC SURGERY
Payer: COMMERCIAL

## 2022-01-10 PROCEDURE — 97140 MANUAL THERAPY 1/> REGIONS: CPT

## 2022-01-10 PROCEDURE — 97110 THERAPEUTIC EXERCISES: CPT

## 2022-01-10 NOTE — PROGRESS NOTES
PT DAILY TREATMENT NOTE     Patient Name: Luly Miner.   Date:1/10/2022  : 1960  [x]  Patient  Verified  Payor: [place] / Plan: [employer] [id number] PPO / Product Type: PPO /    In time:845  Out time:940  Total Treatment Time (min): 55  Visit #: 3 of 10    Medicare/BCBS Only   Total Timed Codes (min):  45 1:1 Treatment Time:  45       Treatment Area: Pain in left shoulder [M25.512]  Strain of muscle(s) and tendon(s) of the rotator cuff of left shoulder, subsequent encounter [S46.587D]    SUBJECTIVE  Pain Level (0-10 scale): 4  Any medication changes, allergies to medications, adverse drug reactions, diagnosis change, or new procedure performed?: [x] No    [] Yes (see summary sheet for update)  Subjective functional status/changes:   [] No changes reported  Patient reported increased stiffness upon arrival    OBJECTIVE    Modality rationale: decrease inflammation and decrease pain to improve the patients ability to perform ADLs   Min Type Additional Details    [] Estim:  []Unatt       []IFC  []Premod                        []Other:  []w/ice   []w/heat  Position:  Location:    [] Estim: []Att    []TENS instruct  []NMES                    []Other:  []w/US   []w/ice   []w/heat  Position:  Location:    []  Traction: [] Cervical       []Lumbar                       [] Prone          []Supine                       []Intermittent   []Continuous Lbs:  [] before manual  [] after manual    []  Ultrasound: []Continuous   [] Pulsed                           []1MHz   []3MHz W/cm2:  Location:    []  Iontophoresis with dexamethasone         Location: [] Take home patch   [] In clinic   10 [x]  Ice     []  heat  []  Ice massage  []  Laser   []  Anodyne Position:seated   Location:left shoulder    []  Laser with stim  []  Other:  Position:  Location:    []  Vasopneumatic Device    []  Right     []  Left  Pre-treatment girth:  Post-treatment girth:  Measured at (location):  Pressure:       [] lo [] med [] hi   Temperature: [] lo [] med [] hi   [] Skin assessment post-treatment:  []intact []redness- no adverse reaction    []redness - adverse reaction:         35 min Therapeutic Exercise:  [x] See flow sheet :   Rationale: increase ROM and increase strength to improve the patients ability to perform ADLs      10 min Manual Therapy:  STM/DTM/TPR left infraspinatus/anterior deltoid in right S/L   The manual therapy interventions were performed at a separate and distinct time from the therapeutic activities interventions. Rationale: increase ROM, increase tissue extensibility and decrease trigger points to perform ADLs      Other Objective/Functional Measures:   -Increased weight of bar for self shoulder flex to 2# - good tolerance today, reassess for progression NV   - AROM supine left shoulder flexion 78 degs    Pain Level (0-10 scale) post treatment: 5    ASSESSMENT/Changes in Function: patient tolerated progressions well without c/o increased pain. Patient will continue to benefit from skilled PT services to modify and progress therapeutic interventions, address functional mobility deficits, address ROM deficits, address strength deficits, analyze and address soft tissue restrictions and analyze and cue movement patterns to attain remaining goals.      [x]  See Plan of Care  []  See progress note/recertification  []  See Discharge Summary         Progress towards goals / Updated goals:  Updated Goals: to be achieved in 10 treatments:  1. Pt to tolerate 30 min or more of TE and/or Interventions w/o increased s/s             Current Status:progressing, slight increased pain post treatment 1/5/22   2. Pt will demonstrate AROM to 110 degs of left shoulder flex and ABD to improve left shoulder function for progress to normal elevation            Current Status:Progressing at Flx 61* ABD 59* in standing 1/5/22  3.  Pt will perform cone stack above shoulder level to perform usual work tasks              Current Status:  Improved to shoulder level today 1/5/22  4.  Pt will improve FOTO score to 70 in 22 visits to show significant improvement in function for progress to good shoulder function with daily activity            Current Status:FOTO = 49    PLAN  [x]  Upgrade activities as tolerated     [x]  Continue plan of care  []  Update interventions per flow sheet       []  Discharge due to:_  []  Other:_      Madhuri Keke, PTA 1/10/2022  8:50 AM    Future Appointments   Date Time Provider Billie Turcios   1/12/2022 10:15 AM Clint Point, PT [facility]   1/14/2022  8:45 AM John Mason MD VS BS AMB   1/17/2022  9:30 AM Author Kristine MCGHEE, PT [facility]   1/19/2022 10:15 AM Clint Point, PT [facility]   1/24/2022  8:45 AM Augustine Lombardo, PTA [facility]   1/26/2022 10:15 AM Jaramillo Point, PT [facility]   1/31/2022  8:45 AM Clint Point, PT [facility]

## 2022-01-12 ENCOUNTER — HOSPITAL ENCOUNTER (OUTPATIENT)
Dept: PHYSICAL THERAPY | Age: 62
Discharge: HOME | End: 2022-01-12
Attending: ORTHOPAEDIC SURGERY
Payer: COMMERCIAL

## 2022-01-12 PROCEDURE — 97140 MANUAL THERAPY 1/> REGIONS: CPT

## 2022-01-12 PROCEDURE — 97110 THERAPEUTIC EXERCISES: CPT

## 2022-01-12 NOTE — PROGRESS NOTES
[facility] at [address] [facility]  [address]  Phone: [phone]      Fax:  [phone]    Progress Note  Patient name: Basim Doss. Start of Care: 2021   Referral Thom Pruitt MD : 1960               Medical Diagnosis: Pain in left shoulder [M25.512]  Strain of muscle(s) and tendon(s) of the rotator cuff of left shoulder, subsequent encounter [S46.012D]  Payor: BLUE CROSS / Plan: [facility] PPO / Product Type: PPO /  Onset Date:21               Treatment Diagnosis: Left ShoulderPain   Prior Hospitalization: see medical history Provider#: [id number]   Medications: Verified on Patient summary List   Comorbidities: OA  Prior Level of Function: Able to use left arm for work and usual activity     Visits from Start of Care: 25    Missed Visits: 0    Established Goals:          Excellent Good         Limited           None  [] Increased ROM   []  []  [x]  []  [] Increased Strength  []  []  [x]  []  [] Increased Mobility  []  []  [x]  []   [] Decreased Pain   []  [x]  []  []  [] Decreased Swelling  []  []  []  []    Key Functional Changes: Patient continues to have limited progress with treatment program and in fact has declined from last progress note. He continues to show good effort but pain with mobility is his limiting factor for progress at this point. No pain at rest but progresses to increased pain at 4-5/10 and significant guard with mobility. See other objective measures below for additional details.     Other Objective/Functional Measures:   - AROM Left Shoulder Flx 40 ABD 46 with increased pain  - PROM Left Shoulder Flx 90* ABD 63* with guarding and pain  - Re initiate pendulums  - Declined mobility of the left shoulder with significant guarding during mobility  - No pain at rest  - Continued tightness of the [facility] joint with limited to no progress over the last few visits  - Pt showing distress and frustration with progress to this point  - Shoulder hike with reach to shoulder level    Updated Goals: to be achieved in 10 treatments:  1. Pt to tolerate 30 min or more of TE and/or Interventions w/o increased s/s               Status at last note/certification: Progressing with continued pain with left shoulder mobility during treatment    Current Status:                2. Pt will demonstrate improved [address] joint mobility to allow AROM to 90 degs of left shoulder flex and ABD in supine, to improve left shoulder function for progress to normal elevation in the upright position              Status at last note/certification: Status: Decline in progressing at Flx 40* ABD 46* in standing   Current Status:              3.   Pt will demonstrate increased ease of shoulder mobility to 90* Flx/ABD for progress to initiation of PRE to improved left shoulder mobility and strength for progress to good shoulder function              Status at last note/certification: Limited ROM    Current Status:  4.  Pt will perform cone stack above shoulder level with little to no accessory shoulder motion to allow overhead reaching for progress to return to his usual work tasks                Status at last note/certification: Improved to shoulder level today   Current Status:            5.  Pt will improve FOTO score to 70 in 22 visits to show significant improvement in function for progress to good shoulder function with daily activity              Status at last note/certification: FOTO = 49   Current Status:           ASSESSMENT/RECOMMENDATIONS:  [x]Continue therapy per initial plan/protocol at a frequency of  2-3 x per week for 10 visits  []Continue therapy with the following recommended changes:_____________________      _____________________________________________________________________  []Discontinue therapy progressing towards or have reached established goals  []Discontinue therapy due to lack of appreciable progress towards goals  []Discontinue therapy due to lack of attendance or compliance  []Await Physician's recommendations/decisions regarding therapy  []Other:________________________________________________________________    Thank you for this referral.   Monserrat Roman, PT 1/12/2022 1:27 PM  NOTE TO PHYSICIAN:  PLEASE COMPLETE THE ORDERS BELOW AND   FAX TO [facility]: [phone]  If you are unable to process this request in 24 hours please contact our office:   [phone]  []  I have read the above report and request that my patient continue as recommended. []  I have read the above report and request that my patient continue therapy with the following changes/special instructions:________________________________________  []I have read the above report and request that my patient be discharged from therapy.     Physician's Signature:____________Date:_________TIME:________     Justin Orlando MD  ** Signature, Date and Time must be completed for valid certification **

## 2022-01-12 NOTE — PROGRESS NOTES
PT DAILY TREATMENT NOTE     Patient Name: Jeimy Peña.   Date:2022  : 1960  [x]  Patient  Verified  Payor: BLUE CROSS / Plan: [employer]ame[employer] [id number] PPO / Product Type: PPO /    In time:1017  Out time:1108  Total Treatment Time (min): 49  Visit #: 4 of 10    Medicare/BCBS Only   Total Timed Codes (min):  49 1:1 Treatment Time:  52       Treatment Area: Pain in left shoulder [M25.512]  Strain of muscle(s) and tendon(s) of the rotator cuff of left shoulder, subsequent encounter [S46.012D]    SUBJECTIVE  Pain Level (0-10 scale): 4  Any medication changes, allergies to medications, adverse drug reactions, diagnosis change, or new procedure performed?: [x] No    [] Yes (see summary sheet for update)  Subjective functional status/changes:   [] No changes reported  Pt showing frustration with progress     OBJECTIVE    Modality rationale: decrease inflammation, decrease pain and increase tissue extensibility to improve the patients ability to tolerate post treatment soreness   Min Type Additional Details    [] Estim:  []Unatt       []IFC  []Premod                        []Other:  []w/ice   []w/heat  Position:  Location:    [] Estim: []Att    []TENS instruct  []NMES                    []Other:  []w/US   []w/ice   []w/heat  Position:  Location:    []  Traction: [] Cervical       []Lumbar                       [] Prone          []Supine                       []Intermittent   []Continuous Lbs:  [] before manual  [] after manual   8 [x]  Ultrasound: [x]Continuous   [] Pulsed                           [x]1MHz   []3MHz W/cm2: 1.7  Location: Left Shoulder    []  Iontophoresis with dexamethasone         Location: [] Take home patch   [] In clinic    []  Ice     []  heat  []  Ice massage  []  Laser   []  Anodyne Position:  Location:    []  Laser with stim  []  Other:  Position:  Location:    []  Vasopneumatic Device    []  Right     []  Left  Pre-treatment girth:  Post-treatment girth:  Measured at (location):  Pressure:       [] lo [] med [] hi   Temperature: [] lo [] med [] hi   [x] Skin assessment post-treatment:  [x]intact []redness- no adverse reaction    []redness - adverse reaction:     25 min Therapeutic Exercise:  [x] See flow sheet :   Rationale: increase ROM, increase strength and improve coordination to improve the patients ability to improve daily function      16 min Manual Therapy:  STM/DTM/TPR left lateral triceps/Deltoids/Teres minor/Infraspinatus   The manual therapy interventions were performed at a separate and distinct time from the therapeutic activities interventions. Rationale: decrease pain, increase ROM, increase tissue extensibility and decrease trigger points to increased activity tolerance          With   [x] TE   [] TA   [] neuro   [] other: Patient Education: [x] Review HEP    [] Progressed/Changed HEP based on:   [] positioning   [] body mechanics   [] transfers   [] heat/ice application    [] other:      Other Objective/Functional Measures:   - AROM Left Shoulder Flx 40 ABD 46 with increased pain  - PROM Left Shoulder Flx 90* ABD 63* with guarding and pain  - Re initiate pendulums       Pain Level (0-10 scale) post treatment: 0 at rest 4 with mobility    ASSESSMENT/Changes in Function:    - Declined mobility of the left shoulder With significant guarding during mobility  - No pain at rest  - Continued tightness of the [address] joint with limited no progress over the last few visits  - Pt showing distress and frustration with progress to this point    Patient will continue to benefit from skilled PT services to modify and progress therapeutic interventions, address functional mobility deficits, address ROM deficits, address strength deficits, analyze and address soft tissue restrictions, analyze and cue movement patterns, analyze and modify body mechanics/ergonomics and assess and modify postural abnormalities to attain remaining goals.      []  See Plan of Care  []  See progress note/recertification  []  See Discharge Summary         Progress towards goals / Updated goals:  Updated Goals: to be achieved in 10 treatments:  1. Pt to tolerate 30 min or more of TE and/or Interventions w/o increased s/s             Current Status: Progressing with continued pain with left shoulder mobility during treatment 1/12/22   2. Pt will demonstrate AROM to 110 degs of left shoulder flex and ABD to improve left shoulder function for progress to normal elevation            Current Status: Decline in progressing at Flx 40* ABD 46* in standing 1/12/22  3.  Pt will perform cone stack above shoulder level to perform usual work tasks              Current Status:  Improved to shoulder level today 1/5/22  4.  Pt will improve FOTO score to 70 in 22 visits to show significant improvement in function for progress to good shoulder function with daily activity            Current Status:FOTO = 49    PLAN  [x]  Upgrade activities as tolerated     [x]  Continue plan of care  []  Update interventions per flow sheet       []  Discharge due to:_  []  Other:_      Harika Morales, PT 1/12/2022  10:40 AM    Future Appointments   Date Time Provider Billie Turcios   1/14/2022  8:45 AM Enrike Cummings  Yomi Girard BS AMB   1/17/2022  9:30 AM Dion Stoll, PT [facility]   1/19/2022 10:15 AM Dion Stoll, PT [facility]   1/24/2022  8:45 AM Denney Brittle, PTA [facility]   1/26/2022 10:15 AM Dion Stoll, PT [facility]   1/31/2022  8:45 AM Dion Stoll, PT [facility]

## 2022-01-14 ENCOUNTER — OFFICE VISIT (OUTPATIENT)
Dept: ORTHOPEDIC SURGERY | Age: 62
End: 2022-01-14
Payer: COMMERCIAL

## 2022-01-14 VITALS
HEIGHT: 67 IN | HEART RATE: 88 BPM | BODY MASS INDEX: 24.8 KG/M2 | WEIGHT: 158 LBS | TEMPERATURE: 97.8 F | OXYGEN SATURATION: 98 %

## 2022-01-14 DIAGNOSIS — M25.612 SHOULDER STIFFNESS, LEFT: Primary | ICD-10-CM

## 2022-01-14 DIAGNOSIS — S46.012D TRAUMATIC COMPLETE TEAR OF LEFT ROTATOR CUFF, SUBSEQUENT ENCOUNTER: ICD-10-CM

## 2022-01-14 PROCEDURE — 99214 OFFICE O/P EST MOD 30 MIN: CPT | Performed by: ORTHOPAEDIC SURGERY

## 2022-01-14 NOTE — PROGRESS NOTES
Patient: Cm Merchant MRN: [id number]       SSN: [id number]  YOB: 1960        AGE: 63 y.o. SEX: male  Body mass index is 24.75 kg/m². PCP: Alisha Torres MD  01/14/22    Chief Complaint: Left shoulder pain/stiffness    HPI: Cm Merchant is a 63 y.o. male patient who returns to the office today for his left shoulder. He continues to have left shoulder pain and stiffness and is now about 5 months out from his rotator cuff repair. He has been going to physical therapy and unfortunately his range of motion has not gotten better. Past Medical History:   Diagnosis Date    Hypertension        No family history on file. Current Outpatient Medications   Medication Sig Dispense Refill    ergocalciferol (Vitamin D2) 1,250 mcg (50,000 unit) capsule Take 1 Capsule by mouth every seven (7) days. 12 Capsule 1    methocarbamoL (ROBAXIN) 500 mg tablet TAKE 1 TABLET BY MOUTH TWO (2) TIMES DAILY AS NEEDED FOR MUSCLE SPASM(S). 40 Tablet 1    diclofenac EC (VOLTAREN) 50 mg EC tablet Take 1 Tablet by mouth two (2) times a day. 40 Tablet 1    amLODIPine (NORVASC) 5 mg tablet Take 1 Tab by mouth daily. (Patient not taking: Reported on 6/9/2021) 30 Tab 5       No Known Allergies    Past Surgical History:   Procedure Laterality Date    AR ANESTH,SURGERY OF SHOULDER Left 08/12/2021    left shoulder A/S RCR BTS    AR COLONOSCOPY W/BIOPSY SINGLE/MULTIPLE  1-8-16    Dr. Cesar Villalpando       Social History     Socioeconomic History    Marital status:      Spouse name: Not on file    Number of children: Not on file    Years of education: Not on file    Highest education level: Not on file   Occupational History    Not on file   Tobacco Use    Smoking status: Never Smoker    Smokeless tobacco: Never Used   Substance and Sexual Activity    Alcohol use: Yes     Alcohol/week: 8.0 standard drinks     Types: 8 Cans of beer per week     Comment: weekends    Drug use:  No  Sexual activity: Yes   Other Topics Concern    Not on file   Social History Narrative    Not on file     Social Determinants of Health     Financial Resource Strain:     Difficulty of Paying Living Expenses: Not on file   Food Insecurity:     Worried About Running Out of Food in the Last Year: Not on file    Irwin of Food in the Last Year: Not on file   Transportation Needs:     Lack of Transportation (Medical): Not on file    Lack of Transportation (Non-Medical): Not on file   Physical Activity:     Days of Exercise per Week: Not on file    Minutes of Exercise per Session: Not on file   Stress:     Feeling of Stress : Not on file   Social Connections:     Frequency of Communication with Friends and Family: Not on file    Frequency of Social Gatherings with Friends and Family: Not on file    Attends [employer] Services: Not on file    Active Member of [address] [place] [address] [employer] or Organizations: Not on file    Attends Club or Organization Meetings: Not on file    Marital Status: Not on file   Intimate Partner Violence:     Fear of Current or Ex-Partner: Not on file    Emotionally Abused: Not on file    Physically Abused: Not on file    Sexually Abused: Not on file   Housing Stability:     Unable to Pay for Housing in the Last Year: Not on file    Number of [place] in the Last Year: Not on file    Unstable Housing in the Last Year: Not on file       REVIEW OF SYSTEMS:      No changes from previous review of systems unless noted. PHYSICAL EXAMINATION:  Visit Vitals  Pulse 88   Temp 97.8 °F (36.6 °C) (Temporal)   Ht 5' 7\" (1.702 m)   Wt 158 lb (71.7 kg)   SpO2 98%   BMI 24.75 kg/m²     Body mass index is 24.75 kg/m². GENERAL: Alert and oriented x3, in no acute distress. HEENT: Normocephalic, atraumatic. RESP: Non labored breathing. SKIN: No rashes or lesions noted. Left shoulder has forward flexion about 60 degrees external rotation of 10 degrees. Pain with any attempts past this.   Unable to test his rotator cuff strength at this time. IMAGING:  No imaging today    ASSESSMENT & PLAN  Diagnosis: Left shoulder stiffness status post rotator cuff repair    Lino aleman continues to have severe stiffness to his left shoulder. This is failed conservative management and I think at this time arthroscopic capsular release as well as evaluation of the rotator cuff repair with possible rotator cuff repair is the preferred treatment. We will get him set up for surgery in the near future. His risk factors for surgery include previous surgery and shoulder stiffness after surgery      Electronically signed by: Alfa Hart MD    Note: This note was completed using voice recognition software.   Any typographical/name errors or mistakes are unintentional.

## 2022-01-17 ENCOUNTER — APPOINTMENT (OUTPATIENT)
Dept: PHYSICAL THERAPY | Age: 62
End: 2022-01-17
Attending: ORTHOPAEDIC SURGERY
Payer: COMMERCIAL

## 2022-01-19 ENCOUNTER — APPOINTMENT (OUTPATIENT)
Dept: PHYSICAL THERAPY | Age: 62
End: 2022-01-19
Attending: ORTHOPAEDIC SURGERY
Payer: COMMERCIAL

## 2022-01-24 ENCOUNTER — APPOINTMENT (OUTPATIENT)
Dept: PHYSICAL THERAPY | Age: 62
End: 2022-01-24
Attending: ORTHOPAEDIC SURGERY
Payer: COMMERCIAL

## 2022-01-26 ENCOUNTER — APPOINTMENT (OUTPATIENT)
Dept: PHYSICAL THERAPY | Age: 62
End: 2022-01-26
Attending: ORTHOPAEDIC SURGERY
Payer: COMMERCIAL

## 2022-01-28 ENCOUNTER — TELEPHONE (OUTPATIENT)
Dept: ORTHOPEDIC SURGERY | Age: 62
End: 2022-01-28

## 2022-01-28 ENCOUNTER — DOCUMENTATION ONLY (OUTPATIENT)
Dept: ORTHOPEDIC SURGERY | Age: 62
End: 2022-01-28

## 2022-01-28 NOTE — PROGRESS NOTES
[employer], current restrictions and limitations form received via fax and placed in the forms bin at [facility]

## 2022-01-28 NOTE — TELEPHONE ENCOUNTER
[employer] form faxed with confirmation to [employer]. One copy placed in the forms bin for patient pick-up, one copy sent to scanning.

## 2022-01-31 ENCOUNTER — APPOINTMENT (OUTPATIENT)
Dept: PHYSICAL THERAPY | Age: 62
End: 2022-01-31
Attending: ORTHOPAEDIC SURGERY
Payer: COMMERCIAL

## 2022-02-11 ENCOUNTER — DOCUMENTATION ONLY (OUTPATIENT)
Dept: ORTHOPEDIC SURGERY | Age: 62
End: 2022-02-11

## 2022-02-11 NOTE — PROGRESS NOTES
Patient dropped off Employee Physical Capabilities Form to [employer]. Form faxed over to [facility] to be placed in forms bin for completion.  Blank copy scanned in to CC

## 2022-02-17 DIAGNOSIS — Z01.810 PREOP CARDIOVASCULAR EXAM: ICD-10-CM

## 2022-02-17 DIAGNOSIS — Z01.812 PRE-OPERATIVE LABORATORY EXAMINATION: ICD-10-CM

## 2022-02-17 DIAGNOSIS — M25.612 SHOULDER STIFFNESS, LEFT: Primary | ICD-10-CM

## 2022-02-21 ENCOUNTER — DOCUMENTATION ONLY (OUTPATIENT)
Dept: ORTHOPEDIC SURGERY | Age: 62
End: 2022-02-21

## 2022-02-24 NOTE — PROGRESS NOTES
[facility] at [address]  [address]  Phone: [phone]      Fax:  [phone]    Discharge Summary    Patient name: Parker Magallanes. Start of Care: 2021   Referral source: Nilesh Dunn MD : 1960               Medical Diagnosis: Pain in left shoulder [M25.512]  Strain of muscle(s) and tendon(s) of the rotator cuff of left shoulder, subsequent encounter [S46.012D]  Payor: BLUE CROSS / Plan: [id number] PPO / Product Type: PPO /  Onset Date:21               Treatment Diagnosis: Left ShoulderPain   Prior Hospitalization: see medical history Provider#: [id number]   Medications: Verified on Patient summary List   Comorbidities: OA  Prior Level of Function: Able to use left arm for work and usual activity     Visits from Start of Care: 25    Missed Visits: 3    Reporting Period : 21 to 22    1. Pt to tolerate 30 min or more of TE and/or Interventions w/o increased s/s             Current Status: Not met, progressing with continued pain with left shoulder mobility during treatment 22   2. Pt will demonstrate AROM to 110 degs of left shoulder flex and ABD to improve left shoulder function for progress to normal elevation            Current Status: Not met, decline in progressing at Flx 40* ABD 46* in standing   3. Pt will perform cone stack above shoulder level to perform usual work tasks              Current Status: Met, Improved to shoulder level today   4. Pt will improve FOTO score to 70 in 22 visits to show significant improvement in function for progress to good shoulder function with daily activity            Current Status: Not met, FOTO = 49       Assessment/ Summary of Care: Patient has shown fair progress with this treatment program. Pain as of last visit was 4/10. Patient has shown decreased pain but continues to have limited mobility.   Patient reports some improvement with overall involvement but feels frustrated with progress at this point. FOTO score is 49. All STG/LTGs achieved as identified above. Patient requested discharge d/t scheduled surgery.        RECOMMENDATIONS:  [x]Discontinue therapy: []Patient has reached or is progressing toward set goals      []Patient is non-compliant or has abdicated      [x]Due to lack of appreciable progress towards set goals    Jennifer Lopez, PTA 2/24/2022 1:14 PM  Sheila Scott, MPT

## 2022-03-01 ENCOUNTER — DOCUMENTATION ONLY (OUTPATIENT)
Dept: ORTHOPEDIC SURGERY | Age: 62
End: 2022-03-01

## 2022-03-01 ENCOUNTER — HOSPITAL ENCOUNTER (OUTPATIENT)
Dept: LAB | Age: 62
Discharge: HOME | End: 2022-03-01
Payer: COMMERCIAL

## 2022-03-01 DIAGNOSIS — Z01.812 PRE-OPERATIVE LABORATORY EXAMINATION: ICD-10-CM

## 2022-03-01 DIAGNOSIS — M25.612 SHOULDER STIFFNESS, LEFT: ICD-10-CM

## 2022-03-01 LAB
ALBUMIN SERPL-MCNC: 3.6 G/DL (ref 3.4–5)
ALBUMIN/GLOB SERPL: 0.9 {RATIO} (ref 0.8–1.7)
ALP SERPL-CCNC: 121 U/L (ref 45–117)
ALT SERPL-CCNC: 80 U/L (ref 16–61)
ANION GAP SERPL CALC-SCNC: 7 MMOL/L (ref 3–18)
AST SERPL-CCNC: 35 U/L (ref 10–38)
ATRIAL RATE: 69 BPM
BASOPHILS # BLD: 0.1 K/UL (ref 0–0.1)
BASOPHILS NFR BLD: 1 % (ref 0–2)
BILIRUB SERPL-MCNC: 0.2 MG/DL (ref 0.2–1)
BUN SERPL-MCNC: 23 MG/DL (ref 7–18)
BUN/CREAT SERPL: 28 (ref 12–20)
CALCIUM SERPL-MCNC: 10.1 MG/DL (ref 8.5–10.1)
CALCULATED P AXIS, ECG09: 46 DEGREES
CALCULATED R AXIS, ECG10: 53 DEGREES
CALCULATED T AXIS, ECG11: 47 DEGREES
CHLORIDE SERPL-SCNC: 103 MMOL/L (ref 100–111)
CO2 SERPL-SCNC: 27 MMOL/L (ref 21–32)
CREAT SERPL-MCNC: 0.83 MG/DL (ref 0.6–1.3)
DIAGNOSIS, 93000: NORMAL
DIFFERENTIAL METHOD BLD: ABNORMAL
EOSINOPHIL # BLD: 0.1 K/UL (ref 0–0.4)
EOSINOPHIL NFR BLD: 1 % (ref 0–5)
ERYTHROCYTE [DISTWIDTH] IN BLOOD BY AUTOMATED COUNT: 15.3 % (ref 11.6–14.5)
GLOBULIN SER CALC-MCNC: 4.1 G/DL (ref 2–4)
GLUCOSE SERPL-MCNC: 132 MG/DL (ref 74–99)
HCT VFR BLD AUTO: 48 % (ref 36–48)
HGB BLD-MCNC: 14.8 G/DL (ref 13–16)
IMM GRANULOCYTES # BLD AUTO: 0.1 K/UL (ref 0–0.04)
IMM GRANULOCYTES NFR BLD AUTO: 1 % (ref 0–0.5)
LYMPHOCYTES # BLD: 1.8 K/UL (ref 0.9–3.6)
LYMPHOCYTES NFR BLD: 29 % (ref 21–52)
MCH RBC QN AUTO: 23.8 PG (ref 24–34)
MCHC RBC AUTO-ENTMCNC: 30.8 G/DL (ref 31–37)
MCV RBC AUTO: 77.2 FL (ref 78–100)
MONOCYTES # BLD: 1 K/UL (ref 0.05–1.2)
MONOCYTES NFR BLD: 16 % (ref 3–10)
NEUTS SEG # BLD: 3.2 K/UL (ref 1.8–8)
NEUTS SEG NFR BLD: 52 % (ref 40–73)
NRBC # BLD: 0 K/UL (ref 0–0.01)
NRBC BLD-RTO: 0 PER 100 WBC
P-R INTERVAL, ECG05: 160 MS
PLATELET # BLD AUTO: 284 K/UL (ref 135–420)
PMV BLD AUTO: 10.6 FL (ref 9.2–11.8)
POTASSIUM SERPL-SCNC: 4.3 MMOL/L (ref 3.5–5.5)
PROT SERPL-MCNC: 7.7 G/DL (ref 6.4–8.2)
Q-T INTERVAL, ECG07: 382 MS
QRS DURATION, ECG06: 84 MS
QTC CALCULATION (BEZET), ECG08: 409 MS
RBC # BLD AUTO: 6.22 M/UL (ref 4.35–5.65)
SODIUM SERPL-SCNC: 137 MMOL/L (ref 136–145)
VENTRICULAR RATE, ECG03: 69 BPM
WBC # BLD AUTO: 6.1 K/UL (ref 4.6–13.2)

## 2022-03-01 PROCEDURE — 80053 COMPREHEN METABOLIC PANEL: CPT

## 2022-03-01 PROCEDURE — 85025 COMPLETE CBC W/AUTO DIFF WBC: CPT

## 2022-03-01 PROCEDURE — 93005 ELECTROCARDIOGRAM TRACING: CPT

## 2022-03-01 PROCEDURE — 36415 COLL VENOUS BLD VENIPUNCTURE: CPT

## 2022-03-01 NOTE — PROGRESS NOTES
Patient dropped off FMLA form to [employer]. Form faxed over to [facility] to be placed in forms bin for completion. Blank copy scanned in to CC.

## 2022-03-04 ENCOUNTER — OFFICE VISIT (OUTPATIENT)
Dept: ORTHOPEDIC SURGERY | Age: 62
End: 2022-03-04
Payer: COMMERCIAL

## 2022-03-04 VITALS
WEIGHT: 160.6 LBS | BODY MASS INDEX: 25.21 KG/M2 | HEIGHT: 67 IN | SYSTOLIC BLOOD PRESSURE: 136 MMHG | DIASTOLIC BLOOD PRESSURE: 84 MMHG | HEART RATE: 78 BPM | OXYGEN SATURATION: 97 % | TEMPERATURE: 98.6 F

## 2022-03-04 DIAGNOSIS — G89.18 ACUTE POST-OPERATIVE PAIN: Primary | ICD-10-CM

## 2022-03-04 DIAGNOSIS — M25.612 SHOULDER STIFFNESS, LEFT: ICD-10-CM

## 2022-03-04 DIAGNOSIS — Z01.810 PREOP CARDIOVASCULAR EXAM: ICD-10-CM

## 2022-03-04 PROCEDURE — 99024 POSTOP FOLLOW-UP VISIT: CPT | Performed by: ORTHOPAEDIC SURGERY

## 2022-03-04 RX ORDER — OXYCODONE HYDROCHLORIDE 5 MG/1
5 TABLET ORAL
Qty: 35 TABLET | Refills: 0 | Status: SHIPPED | OUTPATIENT
Start: 2022-03-04 | End: 2022-03-11

## 2022-03-04 NOTE — PROGRESS NOTES
Patient: Mari Echeverria MRN: [id number]       SSN: [id number]  YOB: 1960        AGE: 63 y.o. SEX: male  Body mass index is 25.15 kg/m². PCP: Sun Mccoy MD  03/04/22     HISTORY AND PHYSICAL    CHIEF COMPLAINT: Left shoulder stiffness    HPI: Mari Echeverria is a 63 y.o. male patient who returns to the office today for his left shoulder. He is here today for his preoperative H&P visit for his upcoming left shoulder surgery. No new complaints. Past Medical History:   Diagnosis Date    Hypertension        History reviewed. No pertinent family history. Current Outpatient Medications   Medication Sig Dispense Refill    ergocalciferol (Vitamin D2) 1,250 mcg (50,000 unit) capsule Take 1 Capsule by mouth every seven (7) days. (Patient not taking: Reported on 3/4/2022) 12 Capsule 1    methocarbamoL (ROBAXIN) 500 mg tablet TAKE 1 TABLET BY MOUTH TWO (2) TIMES DAILY AS NEEDED FOR MUSCLE SPASM(S). (Patient not taking: Reported on 3/4/2022) 40 Tablet 1    diclofenac EC (VOLTAREN) 50 mg EC tablet Take 1 Tablet by mouth two (2) times a day. (Patient not taking: Reported on 3/4/2022) 40 Tablet 1    amLODIPine (NORVASC) 5 mg tablet Take 1 Tab by mouth daily. (Patient not taking: Reported on 6/9/2021) 30 Tab 5       No Known Allergies    Past Surgical History:   Procedure Laterality Date    PA ANESTH,SURGERY OF SHOULDER Left 08/12/2021    left shoulder A/S RCR BTS    PA COLONOSCOPY W/BIOPSY SINGLE/MULTIPLE  1-8-16    Dr. Lillian Acevedo       Social History     Socioeconomic History    Marital status:      Spouse name: Not on file    Number of children: Not on file    Years of education: Not on file    Highest education level: Not on file   Occupational History    Not on file   Tobacco Use    Smoking status: Never Smoker    Smokeless tobacco: Never Used   Vaping Use    Vaping Use: Never used   Substance and Sexual Activity    Alcohol use:  Yes Alcohol/week: 8.0 standard drinks     Types: 8 Cans of beer per week     Comment: weekends    Drug use: No    Sexual activity: Yes   Other Topics Concern    Not on file   Social History Narrative    Not on file     Social Determinants of Health     Financial Resource Strain:     Difficulty of Paying Living Expenses: Not on file   Food Insecurity:     Worried About Running Out of Food in the Last Year: Not on file    Irwin of Food in the Last Year: Not on file   Transportation Needs:     Lack of Transportation (Medical): Not on file    Lack of Transportation (Non-Medical): Not on file   Physical Activity:     Days of Exercise per Week: Not on file    Minutes of Exercise per Session: Not on file   Stress:     Feeling of Stress : Not on file   Social Connections:     Frequency of Communication with Friends and Family: Not on file    Frequency of Social Gatherings with Friends and Family: Not on file    Attends Hindu Services: Not on file    Active Member of [address] [place] [address] or Organizations: Not on file    Attends Club or Organization Meetings: Not on file    Marital Status: Not on file   Intimate Partner Violence:     Fear of Current or Ex-Partner: Not on file    Emotionally Abused: Not on file    Physically Abused: Not on file    Sexually Abused: Not on file   Housing Stability:     Unable to Pay for Housing in the Last Year: Not on file    Number of [place] in the Last Year: Not on file    Unstable Housing in the Last Year: Not on file       REVIEW OF SYSTEMS:    14 point review of systems on the intake form is negative except as noted in the HPI    PHYSICAL EXAMINATION:  Visit Vitals  BP (!) 153/82 (BP 1 Location: Right arm, BP Patient Position: Sitting, BP Cuff Size: Large adult)   Pulse 78   Temp 98.6 °F (37 °C) (Temporal)   Ht 5' 7\" (1.702 m)   Wt 160 lb 9.6 oz (72.8 kg)   SpO2 97%   BMI 25.15 kg/m²     Body mass index is 25.15 kg/m².     GENERAL: Alert and oriented x3, in no acute distress, well-developed, well-nourished. HEENT: Normocephalic, atraumatic. Left shoulder has forward flexion about 60 degrees external rotation of 10 degrees. Pain with any attempts past this. Unable to test his rotator cuff strength at this time. IMAGING:  No imaging today    ASSESSMENT & PLAN  Diagnosis: Left shoulder postoperative stiffness    Марина Tovar is here today for his preoperative examination for his upcoming left shoulder surgery. We discussed the surgery as well as the risk benefits and recovery and what we are planning to do which would be an arthroscopic capsular release and possible rotator cuff repair. We will see him back in postoperative follow-up. Pain medication was sent to his pharmacy. Prescription medication management discussed. Electronically signed by: Gianni Becerra MD    Note: This note was completed using voice recognition software.   Any typographical/name errors or mistakes are unintentional.

## 2022-03-07 ENCOUNTER — DOCUMENTATION ONLY (OUTPATIENT)
Dept: ORTHOPEDIC SURGERY | Age: 62
End: 2022-03-07

## 2022-03-07 NOTE — PROGRESS NOTES
FMLA form completed for family member and faxed--pt wants to  at [facility] location--faxed to  at [facility] for pt to

## 2022-03-11 DIAGNOSIS — M25.612 SHOULDER STIFFNESS, LEFT: Primary | ICD-10-CM

## 2022-03-14 NOTE — PROGRESS NOTES
Jeimy Peña.  1960     HISTORY OF PRESENT ILLNESS  Jeimy Soliman is a 63 y.o. 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 Patient has not been going to PT. Describes pain as a 4/10. Has been taking nothing for pain. Still has night pain. He reports working on his exercises every hour while he is awake. He has not started PT yet. Patient denies any fever, chills, chest pain, shortness of breath or calf pain. There are no new illness or injuries to report since last seen in the office. PHYSICAL EXAM:   Visit Vitals  Temp 97.7 °F (36.5 °C)   Ht 5' 7\" (1.702 m)   Wt 159 lb (72.1 kg)   BMI 24.90 kg/m²      The patient is a well-developed, well-nourished male in no acute distress. The patient is alert and oriented times three. The patient appears to be well groomed. Mood and affect are normal.  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 Sutures removed and steri strips applied  Surgery was discussed at length today. He is out of his sling which is good. Will start PT and exercises now. Will place another order today. Stressed to patient that nothing causes an increase in pain. Pt not given a refill of pain medication today.   RTC 3 weeks    Patient seen and evaluated by Dr. Cherry Gonzalez today who agrees with treatment plan      Amena Mitcehll [facility]

## 2022-03-16 ENCOUNTER — DOCUMENTATION ONLY (OUTPATIENT)
Dept: ORTHOPEDIC SURGERY | Age: 62
End: 2022-03-16

## 2022-03-16 ENCOUNTER — TELEPHONE (OUTPATIENT)
Dept: PHYSICAL THERAPY | Age: 62
End: 2022-03-16

## 2022-03-16 NOTE — PROGRESS NOTES
[employer] faxed over [id number] Form to [facility]. Form faxed to  for completion. Blank copy scanned in to CC.

## 2022-03-18 ENCOUNTER — OFFICE VISIT (OUTPATIENT)
Dept: ORTHOPEDIC SURGERY | Age: 62
End: 2022-03-18
Payer: COMMERCIAL

## 2022-03-18 VITALS — HEIGHT: 67 IN | TEMPERATURE: 97.7 F | WEIGHT: 159 LBS | BODY MASS INDEX: 24.96 KG/M2

## 2022-03-18 DIAGNOSIS — Z98.890 STATUS POST ARTHROSCOPY OF LEFT SHOULDER: ICD-10-CM

## 2022-03-18 DIAGNOSIS — M25.612 SHOULDER STIFFNESS, LEFT: Primary | ICD-10-CM

## 2022-03-18 PROCEDURE — 99024 POSTOP FOLLOW-UP VISIT: CPT | Performed by: ORTHOPAEDIC SURGERY

## 2022-03-19 PROBLEM — M25.551 PAIN OF RIGHT HIP JOINT: Status: ACTIVE | Noted: 2018-01-19

## 2022-03-20 PROBLEM — M25.511 CHRONIC RIGHT SHOULDER PAIN: Status: ACTIVE | Noted: 2019-04-04

## 2022-03-20 PROBLEM — R10.9 FLANK PAIN: Status: ACTIVE | Noted: 2019-04-04

## 2022-03-20 PROBLEM — G89.29 CHRONIC RIGHT SHOULDER PAIN: Status: ACTIVE | Noted: 2019-04-04

## 2022-03-21 ENCOUNTER — DOCUMENTATION ONLY (OUTPATIENT)
Dept: ORTHOPEDIC SURGERY | Age: 62
End: 2022-03-21

## 2022-03-21 ENCOUNTER — HOSPITAL ENCOUNTER (OUTPATIENT)
Dept: PHYSICAL THERAPY | Age: 62
Discharge: HOME | End: 2022-03-21
Attending: ORTHOPAEDIC SURGERY
Payer: COMMERCIAL

## 2022-03-21 PROCEDURE — 97140 MANUAL THERAPY 1/> REGIONS: CPT

## 2022-03-21 PROCEDURE — 97161 PT EVAL LOW COMPLEX 20 MIN: CPT

## 2022-03-21 PROCEDURE — 97110 THERAPEUTIC EXERCISES: CPT

## 2022-03-21 NOTE — PROGRESS NOTES
PT DAILY TREATMENT NOTE/SHOULDER EVAL     Patient Name: Karen Galindo. Date:3/21/2022  : 1960  [x]  Patient  Verified  Payor: [employer] / Plan: Karan [employer]ame[employer] [id number] PPO / Product Type: PPO /    In time:1029  Out time:1110  Total Treatment Time (min): 41  Visit #: 1 of 10    Medicare/BCBS Only   Total Timed Codes (min):  23 1:1 Treatment Time:  41       Treatment Area: Left shoulder pain [M25.512]      SUBJECTIVE  Pain Level (0-10 scale): 4  []constant [x]intermittent []improving []worsening []no change since onset     Any medication changes, allergies to medications, adverse drug reactions, diagnosis change, or new procedure performed?: [x] No    [] Yes (see summary sheet for update)  Subjective functional status/changes:       Subjective: Patient is a 63 y.o. male referred to PT with the above Dx. Patient seen today S/P left shoulder revision for Arthroscopic Capsule release. Patient advises that MD advised him to use the shoulder but do not over do it. Onset of surgery on 3/10/22     Patient presents to PT with decreased strength, decreased flexibility, and decreased mobility and function of the left shoulder. Patient s/s appear to be consistent w/ diagnosis. Patient demonstrates the potential to make functional gains within a reasonable time frame and will benefit from skilled PT to address impairments and improve functional mobility and strength for an improved quality of life. Fall Risk Assessment: Patient demonstrates no Fall Risk      Mechanism of Injury: Arthroscopic Capsule release     Comorbidities: None noted  Prior Level of Function: Able to use the left shoulder to perform normal activity and work tasks    FOTO: 45 / 65 in 16 visits    Goal: Be able to get my arm up    Health Status: Gair      What type of work do you do?  [employer], a lot of lifting overhead    Living Situation/Domestic Life: Lives at home with wife  Mobility: (I)  Self Care (I)    What type of daily activities/hobbies? Fishing, sports, basketball    Limitations to Activity/Recreation/PLOF: Limited ability for mobility of the left shoulder, elevating left arm,  Can't reach up         Barriers: [x]pain []financial []time []transportation []other    Motivation: High    FABQ Score: []low []elevate    Cognition: A & O x 3    Other:    Risk For Falls:   [x]No  []low []elevate       OBJECTIVE/EXAMINATION  - PROM level with empty fill  - Little TTP surrounding the shoulder  - Pain prior to end range with PROM  - Limited AROM at this time  - Tight GH joint  - Decreased mobility left SC Jt     AROM PROM Pain? ?    Right Left Right Left    Shoulder Flx 38  84  Yes   Shoulder Ext 16    Yes   Shoulder ABD 44  77  Yes   Shoulder ADD   N     Shoulder IR NT  Torso     Shoulder ER 10  6  Yes                 Modality rationale: decrease inflammation and decrease pain to improve the patients ability to tolerate post treatment soreness   Min Type Additional Details      [] Estim:  []Unatt       []IFC  []Premod                        []Other:  []w/ice   []w/heat  Position:  Location:      [] Estim: []Att    []TENS instruct  []NMES                    []Other:  []w/US   []w/ice   []w/heat  Position:  Location:      []  Traction: [] Cervical       []Lumbar                       [] Prone          []Supine                       []Intermittent   []Continuous Lbs:  [] before manual  [] after manual      []  Ultrasound: []Continuous   [] Pulsed                           []1MHz   []3MHz W/cm2:  Location:      []  Iontophoresis with dexamethasone         Location: [] Take home patch   [] In clinic    10  [x]  Ice with TE    []  heat  []  Ice massage  []  Laser   []  Anodyne Position: Supine  Location: Left shoulder      []  Laser with stim  []  Other:  Position:  Location:      []  Vasopneumatic Device Pressure:       [] lo [] med [] hi   Temperature: [] lo [] med [] hi    [x] Skin assessment post-treatment:  [x]intact []redness- no adverse reaction    []redness - adverse reaction:      18 min [x]Eval                  []Re-Eval         10 min Therapeutic Exercise:  [] See flow sheet : Develop and instruct HEP   Rationale: increase ROM, increase strength, and improve coordination to improve the patients ability to Initiate HEP and improve daily function     13 min Manual Therapy:  PROM left shoulder Scap/Flx/ABD, LAD left UE, Left SC/AC joint mobs   Rationale: decrease pain, increase ROM and increase tissue extensibility to improve tissue extensibility and activity tolerance                                                                   With   [x] TE   [] TA   [] neuro   [] other: Patient Education: [x] Review HEP    [] Progressed/Changed HEP based on:   [] positioning   [] body mechanics   [] transfers   [] heat/ice application    [] other:       Other Objective/Functional Measures:      Access Code: [email or web address]  URL: [email or web address]  Date: 03/21/2022  Prepared by: Ricco Peeling    Exercises  Circular Shoulder Pendulum with Table Support - 1 x daily - 7 x weekly - 3 sets - 10 reps  Horizontal Shoulder Pendulum with Table Support - 1 x daily - 7 x weekly - 3 sets - 10 reps  Flexion-Extension Shoulder Pendulum with Table Support - 1 x daily - 7 x weekly - 3 sets - 10 reps  Seated Elbow Flexion and Extension AROM - 1 x daily - 7 x weekly - 3 sets - 10 reps  Wrist Flexion AROM - 1 x daily - 7 x weekly - 3 sets - 10 reps  Seated Gripping Towel - 1 x daily - 7 x weekly - 3 sets - 10 reps  Standing Shoulder Shrugs - 1 x daily - 7 x weekly - 3 sets - 10 reps  Standing Scapular Retraction - 1 x daily - 7 x weekly - 3 sets - 10 reps  Standing Backward Shoulder Shrug with Dumbbells - 1 x daily - 7 x weekly - 3 sets - 10 reps  Isometric Shoulder Adduction - 1 x daily - 7 x weekly - 3 sets - 10 reps  Isometric Shoulder Flexion at Wall - 1 x daily - 7 x weekly - 3 sets - 10 reps  Isometric Shoulder Extension at Wall - 1 x daily - 7 x weekly - 3 sets - 10 reps  Supine Shoulder Flexion AAROM - 1 x daily - 7 x weekly - 3 sets - 10 reps  Supine Shoulder Protraction with Dowel - 1 x daily - 7 x weekly - 3 sets - 10 reps  Supine Shoulder Flexion with Dowel - 1 x daily - 7 x weekly - 3 sets - 10 reps  Standing Shoulder Extension with Dowel - 1 x daily - 7 x weekly - 3 sets - 10 reps    Pain Level (0-10 scale) post treatment: 3-4     ASSESSMENT/Changes in Function:    - PROM Shoulder Flx 110        [x]  See Plan of Care  []  See progress note/recertification  []  See Discharge Summary         Progress towards goals / Updated goals:  Short Term Goals: To be accomplished in 5 treatments:  1. Pt will be compliant and independent with HEP in order to facilitate PT sessions and aid with self management   Eval Status:  Initiated   Current Status:  2. Pt to tolerate 30 min or more of TE and/or Interventions w/o increased s/s   Eval Status:  Initiated   Current Status:    Long Term Goals: To be accomplished in 10 treatments:  1. Pt will report 50% improvement or better with Left shoulder dysfunction to show a significant increase in ability to tolerate ADL   Eval Status:  Initiated   Current Status:  2. Pt will demonstrate PROM to 120* Flx/ABD for progress to AAROM/AROM to 120* with good tolerance and little pain    Eval Status:  PROM left shoulder Flx 84* ABD 77*   Current Status:  3. Pt will demonstrate AROM left shoulder Flx/ABD to 90* for progress to performing light activity at shoulder level with little difficulty     Eval Status:  AROM left shoulder Flx 38* ABD 44*   Current Status:  4. Pt will have decreased pain at 2/10 with performing activity with the left shoulder for carryover to increased use of the left UE with daily actvity     Eval Status:  Pain at 4/10 periodically    Current Status:  5.   Pt will improve FOTO score to 65 in 16 visits to show significant improvement for progress to normal function   Eval Status: FOTO = 45   Current Status:      PLAN  [x]  Upgrade activities as tolerated     [x]  Continue plan of care  []  Update interventions per flow sheet       []  Discharge due to:_  []  Other:_      Blessing Espinoza PT 3/21/2022  10:28 AM

## 2022-03-21 NOTE — PROGRESS NOTES
[facility] at [address] [facility]  [address]  Phone: [phone]      Fax:  [phone]    Plan of Care/ Statement of Necessity for Physical Therapy Services  Patient name: Jeimy Peña. Start of Care: 3/21/2022   Referral source: Dakota Fernández MD : 1960    Medical Diagnosis: Left shoulder pain [M25.512]  Payor: [employer] / Plan: [facility] PPO / Product Type: PPO /  Onset Date:3/1022    Treatment Diagnosis: Left Shoulder Pain   Prior Hospitalization: see medical history Provider#: [id number]   Medications: Verified on Patient summary List   Comorbidities: None noted  Prior Level of Function: Able to use the left shoulder to perform normal activity and work tasks     The Plan of Care and following information is based on the information from the initial evaluation. Assessment/ key information: Patient is a 63 y.o. male referred to PT with the above Dx. Patient seen today S/P left shoulder revision for Arthroscopic Capsule release. Patient advises that MD advised him to use the shoulder but do not over do it. Onset of surgery on 3/10/22      Patient presents to PT with decreased strength, decreased flexibility, and decreased mobility and function of the left shoulder. Patient s/s appear to be consistent w/ diagnosis. Patient demonstrates the potential to make functional gains within a reasonable time frame and will benefit from skilled PT to address impairments and improve functional mobility and strength for an improved quality of life.   Fall Risk Assessment: Patient demonstrates no Fall Risk   Evaluation Complexity History HIGH Complexity :3+ comorbidities / personal factors will impact the outcome/ POC ; Examination LOW Complexity : 1-2 Standardized tests and measures addressing body structure, function, activity limitation and / or participation in recreation  ;Presentation LOW Complexity : Stable, uncomplicated  ;Clinical Decision Making MEDIUM Complexity : FOTO score of 26-74  Overall Complexity Rating: LOW   Problem List: pain affecting function, decrease ROM, decrease strength, impaired gait/ balance, decrease ADL/ functional abilitiies, decrease activity tolerance, decrease flexibility/ joint mobility, decrease transfer abilities and other FOTO = 45   Treatment Plan may include any combination of the following: Therapeutic exercise, Therapeutic activities, Neuromuscular re-education, Physical agent/modality, Manual therapy, Patient education, Self Care training, Functional mobility training and Home safety training  Patient / Family readiness to learn indicated by: asking questions, trying to perform skills and interest  Persons(s) to be included in education: patient (P)  Barriers to Learning/Limitations: None  Patient Goal (s): Be able to get my arm up  Patient Self Reported Health Status: fair  Rehabilitation Potential: good    Short Term Goals: To be accomplished in 5 treatments:  1. Pt will be compliant and independent with HEP in order to facilitate PT sessions and aid with self management   Eval Status:  Initiated   Current Status:  2. Pt to tolerate 30 min or more of TE and/or Interventions w/o increased s/s   Eval Status:  Initiated   Current Status:    Long Term Goals: To be accomplished in 10 treatments:  1. Pt will report 50% improvement or better with Left shoulder dysfunction to show a significant increase in ability to tolerate ADL   Eval Status:  Initiated   Current Status:  2. Pt will demonstrate PROM to 120* Flx/ABD for progress to AAROM/AROM to 120* with good tolerance and little pain    Eval Status:  PROM left shoulder Flx 84* ABD 77*   Current Status:  3. Pt will demonstrate AROM left shoulder Flx/ABD to 90* for progress to performing light activity at shoulder level with little difficulty     Eval Status:  AROM left shoulder Flx 38* ABD 44*   Current Status:  4.   Pt will have decreased pain at 2/10 with performing activity with the left shoulder for carryover to increased use of the left UE with daily actvity     Eval Status:  Pain at 4/10 periodically    Current Status:  5. Pt will improve FOTO score to 65 in 16 visits to show significant improvement for progress to normal function   Eval Status: FOTO = 45   Current Status:     Frequency / Duration: Patient to be seen 2-3 times per week for 10 treatments. Patient/ Caregiver education and instruction: Diagnosis, prognosis, self care, activity modification and exercises   [x]  Plan of care has been reviewed with PTA      Khoi Rodriguez, PT 3/21/2022 10:25 AM  _____________________________________________________________________  I certify that the above Therapy Services are being furnished while the patient is under my care. I agree with the treatment plan and certify that this therapy is necessary.     Physician's Signature:____________Date:_________TIME:________     Rosangela Meyer MD  ** Signature, Date and Time must be completed for valid certification **    Please sign and return to [facility] at [address] [facility].[place] [address] [place] [address] [address]  Phone: [phone]      Fax:  [phone]

## 2022-03-21 NOTE — PROGRESS NOTES
[employer] faxed over Attending Physicians Statement to [facility]. Form faxed to  for completion. Blank copy scanned in to CC.

## 2022-03-22 ENCOUNTER — TELEPHONE (OUTPATIENT)
Dept: PHYSICAL THERAPY | Age: 62
End: 2022-03-22

## 2022-03-24 ENCOUNTER — DOCUMENTATION ONLY (OUTPATIENT)
Dept: ORTHOPEDIC SURGERY | Age: 62
End: 2022-03-24

## 2022-03-24 ENCOUNTER — HOSPITAL ENCOUNTER (OUTPATIENT)
Dept: PHYSICAL THERAPY | Age: 62
Discharge: HOME | End: 2022-03-24
Attending: ORTHOPAEDIC SURGERY
Payer: COMMERCIAL

## 2022-03-24 ENCOUNTER — APPOINTMENT (OUTPATIENT)
Dept: PHYSICAL THERAPY | Age: 62
End: 2022-03-24
Attending: ORTHOPAEDIC SURGERY
Payer: COMMERCIAL

## 2022-03-24 PROCEDURE — 97110 THERAPEUTIC EXERCISES: CPT

## 2022-03-24 PROCEDURE — 97140 MANUAL THERAPY 1/> REGIONS: CPT

## 2022-03-24 NOTE — PROGRESS NOTES
PT DAILY TREATMENT NOTE     Patient Name: Anabelle Leal.   Date:3/24/2022  : 1960  [x]  Patient  Verified  Payor: /    In time:930  Out time:1020  Total Treatment Time (min): 50  Visit #: 1 of 10    Medicare/BCBS Only   Total Timed Codes (min):  42 1:1 Treatment Time:  42       Treatment Area: Left shoulder pain [M25.512]    SUBJECTIVE  Pain Level (0-10 scale): 0/10  Any medication changes, allergies to medications, adverse drug reactions, diagnosis change, or new procedure performed?: [x] No    [] Yes (see summary sheet for update)  Subjective functional status/changes:   [] No changes reported  No c/o pain     OBJECTIVE    Modality rationale: decrease inflammation, decrease pain and increase tissue extensibility to improve the patients ability to perform ADLs   Min Type Additional Details    [] Estim:  []Unatt       []IFC  []Premod                        []Other:  []w/ice   []w/heat  Position:  Location:    [] Estim: []Att    []TENS instruct  []NMES                    []Other:  []w/US   []w/ice   []w/heat  Position:  Location:    []  Traction: [] Cervical       []Lumbar                       [] Prone          []Supine                       []Intermittent   []Continuous Lbs:  [] before manual  [] after manual    []  Ultrasound: []Continuous   [] Pulsed                           []1MHz   []3MHz W/cm2:  Location:    []  Iontophoresis with dexamethasone         Location: [] Take home patch   [] In clinic   8 [x]  Ice     []  heat  []  Ice massage  []  Laser   []  Anodyne Position: Supine  Location: Left shoulder    []  Laser with stim  []  Other:  Position:  Location:    []  Vasopneumatic Device    []  Right     []  Left  Pre-treatment girth:  Post-treatment girth:  Measured at (location):  Pressure:       [] lo [] med [] hi   Temperature: [] lo [] med [] hi   [x] Skin assessment post-treatment:  [x]intact []redness- no adverse reaction    []redness - adverse reaction:     27 min Therapeutic Exercise:  [] See flow sheet :   Rationale: increase ROM and increase strength to improve the patients ability to perform ADLs      15 min Manual Therapy:  PROM left shoulder scap/flx/abd, gentle GH mobs, STM/DTM left upper pec    The manual therapy interventions were performed at a separate and distinct time from the therapeutic activities interventions. Rationale: decrease pain, increase ROM, increase tissue extensibility and decrease trigger points to perform ADLs            With   [] TE   [] TA   [] neuro   [] other: Patient Education: [x] Review HEP    [] Progressed/Changed HEP based on:   [] positioning   [] body mechanics   [] transfers   [] heat/ice application    [] other:      Other Objective/Functional Measures:   - Initiated TE per flow sheet   - Reached Lvl 18 on the finger ladder  - TTP left upper pec with tightness     Pain Level (0-10 scale) post treatment: 3/10    ASSESSMENT/Changes in Function: Patient actively participates in therapy and tolerates progressions well with minor c/o increased pain     Patient will continue to benefit from skilled PT services to modify and progress therapeutic interventions, address functional mobility deficits, address ROM deficits, address strength deficits, analyze and address soft tissue restrictions and analyze and cue movement patterns to attain remaining goals. []  See Plan of Care  []  See progress note/recertification  []  See Discharge Summary    Progress towards goals / Updated goals:  Short Term Goals: To be accomplished in 5 treatments:  1. Pt will be compliant and independent with HEP in order to facilitate PT sessions and aid with self management             Eval Status:  Initiated             Current Status:  2. Pt to tolerate 30 min or more of TE and/or Interventions w/o increased s/s             Eval Status:  Initiated             Current Status:     Long Term Goals: To be accomplished in 10 treatments:  1.   Pt will report 50% improvement or better with Left shoulder dysfunction to show a significant increase in ability to tolerate ADL             Eval Status:  Initiated             Current Status:  2. Pt will demonstrate PROM to 120* Flx/ABD for progress to AAROM/AROM to 120* with good tolerance and little pain              Eval Status:  PROM left shoulder Flx 84* ABD 77*             Current Status:  3. Pt will demonstrate AROM left shoulder Flx/ABD to 90* for progress to performing light activity at shoulder level with little difficulty               Eval Status:  AROM left shoulder Flx 38* ABD 44*             Current Status:  4. Pt will have decreased pain at 2/10 with performing activity with the left shoulder for carryover to increased use of the left UE with daily actvity               Eval Status:  Pain at 4/10 periodically              Current Status:  5.   Pt will improve FOTO score to 65 in 16 visits to show significant improvement for progress to normal function             Eval Status: FOTO = 45             Current Status:     PLAN  [x]  Upgrade activities as tolerated     [x]  Continue plan of care  []  Update interventions per flow sheet       []  Discharge due to:_  []  Other:_      Vinny Hough PTA 3/24/2022  9:34 AM    Future Appointments   Date Time Provider Billie Turcios   3/29/2022  9:30 AM Lynford Leyden, PTA [facility]   3/31/2022  9:30 AM Timothy Camacho, PT [facility]   4/5/2022  9:30 AM Timothy Camacho, PT [facility]   4/7/2022  9:30 AM Timothy Camacho, PT [facility]   4/8/2022  8:30 AM JOSE Lui VS BS AMB   4/12/2022  9:30 AM Klarissa MCGHEE, PT [facility]   4/14/2022  9:30 AM Tiomthy Camacho PT [facility]   4/19/2022  9:30 AM [facility]

## 2022-03-24 NOTE — PROGRESS NOTES
[employer] Attending Physicians Statement  form completed, faxed and confirmation received. Copy made for scanning. Original placed in forms file at .

## 2022-03-29 ENCOUNTER — HOSPITAL ENCOUNTER (OUTPATIENT)
Dept: PHYSICAL THERAPY | Age: 62
Discharge: HOME | End: 2022-03-29
Attending: ORTHOPAEDIC SURGERY
Payer: COMMERCIAL

## 2022-03-29 ENCOUNTER — APPOINTMENT (OUTPATIENT)
Dept: PHYSICAL THERAPY | Age: 62
End: 2022-03-29
Attending: ORTHOPAEDIC SURGERY
Payer: COMMERCIAL

## 2022-03-29 PROCEDURE — 97110 THERAPEUTIC EXERCISES: CPT

## 2022-03-29 PROCEDURE — 97035 APP MDLTY 1+ULTRASOUND EA 15: CPT

## 2022-03-29 PROCEDURE — 97140 MANUAL THERAPY 1/> REGIONS: CPT

## 2022-03-29 NOTE — PROGRESS NOTES
PT DAILY TREATMENT NOTE     Patient Name: Gisela Elam.   Date:3/29/2022  : 1960  [x]  Patient  Verified  Payor: /    In time:930  Out time:1025  Total Treatment Time (min): 55  Visit #: 3 of 10    Treatment Area: Left shoulder pain [M25.512]    SUBJECTIVE  Pain Level (0-10 scale): 0/10  Any medication changes, allergies to medications, adverse drug reactions, diagnosis change, or new procedure performed?: [x] No    [] Yes (see summary sheet for update)  Subjective functional status/changes:   [] No changes reported  No c/o pain today, but reports a 6/10 going to bed last night     OBJECTIVE    Modality rationale: decrease inflammation and decrease pain to improve the patients ability to perform ADLs   Min Type Additional Details    [] Estim:  []Unatt       []IFC  []Premod                        []Other:  []w/ice   []w/heat  Position:  Location:    [] Estim: []Att    []TENS instruct  []NMES                    []Other:  []w/US   []w/ice   []w/heat  Position:  Location:    []  Traction: [] Cervical       []Lumbar                       [] Prone          []Supine                       []Intermittent   []Continuous Lbs:  [] before manual  [] after manual   8 [x]  Ultrasound: [x]Continuous   [] Pulsed                           []1MHz   [x]3MHz W/cm2: 1.3  Location: left shoulder/bicep     []  Iontophoresis with dexamethasone         Location: [] Take home patch   [] In clinic    []  Ice     []  heat  []  Ice massage  []  Laser   []  Anodyne Position:   Location:     []  Laser with stim  []  Other:  Position:  Location:    []  Vasopneumatic Device    []  Right     []  Left  Pre-treatment girth:  Post-treatment girth:  Measured at (location):  Pressure:       [] lo [] med [] hi   Temperature: [] lo [] med [] hi   [x] Skin assessment post-treatment:  [x]intact []redness- no adverse reaction    []redness - adverse reaction:       27 min Therapeutic Exercise:  [] See flow sheet :   Rationale: increase ROM and increase strength to improve the patients ability to perform ADLs    20 min Manual Therapy:  PROM left shoulder scap/flx/abd, gentle GH mobs and distraction, STM/DTM/TPR, Left shoulder, proximal bicep ms and upper pec    The manual therapy interventions were performed at a separate and distinct time from the therapeutic activities interventions. Rationale: decrease pain, increase ROM, increase tissue extensibility and decrease trigger points to perform ADLs        With   [x] TE   [] TA   [] neuro   [] other: Patient Education: [x] Review HEP    [] Progressed/Changed HEP based on:   [] positioning   [] body mechanics   [] transfers   [] heat/ice application    [] other:      Other Objective/Functional Measures:   - Initiated shrugs, pinches, and retro circles   - Initiate table slides   - Initiated scap retractions and scap ret w/ Ext  - Pt able to to reach Lvl 19 on the finger ladder    - TrP in left proximal bicep with tightness   - Tightness in the upper pec and proximal deltoid     Pain Level (0-10 scale) post treatment: 3/10    ASSESSMENT/Changes in Function: patient actively participates in therapy with minor c/o increased pain during TE/interventions. Cont decreased activity tolerace and mobility in the left shoulder but showing min improvement in ROM. Patient will continue to benefit from skilled PT services to modify and progress therapeutic interventions, address functional mobility deficits, address ROM deficits, address strength deficits, analyze and address soft tissue restrictions and analyze and cue movement patterns to attain remaining goals.      []  See Plan of Care  []  See progress note/recertification  []  See Discharge Summary         Progress towards goals / Updated goals:  Short Term Goals: To be accomplished in 5 treatments:  1.  Pt will be compliant and independent with HEP in order to facilitate PT sessions and aid with self management             Eval Status:  Initiated             Current Status: Reports compliance (3/29/22)   2.  Pt to tolerate 30 min or more of TE and/or Interventions w/o increased s/s             Eval Status:  Initiated             Current Status:     Long Term Goals: To be accomplished in 10 treatments:  1.  Pt will report 50% improvement or better with Left shoulder dysfunction to show a significant increase in ability to tolerate ADL             Eval Status:  Initiated             Current Status:  2.  Pt will demonstrate PROM to 120* Flx/ABD for progress to AAROM/AROM to 120* with good tolerance and little pain              Eval Status:  PROM left shoulder Flx 84* ABD 77*             Current Status:  3.  Pt will demonstrate AROM left shoulder Flx/ABD to 90* for progress to performing light activity at shoulder level with little difficulty               Eval Status:  AROM left shoulder Flx 38* ABD 44*             Current Status:  4.  Pt will have decreased pain at 2/10 with performing activity with the left shoulder for carryover to increased use of the left UE with daily actvity               Eval Status:  Pain at 4/10 periodically              Current Status:  Progressing, reports 3/10 pain throughout TE today.    5.  Pt will improve FOTO score to 65 in 16 visits to show significant improvement for progress to normal function             Eval Status: FOTO = 45             Current Status:     PLAN  [x]  Upgrade activities as tolerated     [x]  Continue plan of care  []  Update interventions per flow sheet       []  Discharge due to:_  []  Other:_      Bar Henry, DAYSI 3/29/2022  9:33 AM    Future Appointments   Date Time Provider Billie Turcios   3/31/2022  9:30 AM Alcon Coto PT [facility]   4/5/2022  9:30 AM Alcon Coto PT [facility]   4/7/2022  9:30 AM Alcon Coto PT [facility]   4/8/2022  8:30 AM JOSE Stallings VS BS AMB   4/12/2022  9:30 AM Alcon Coto, PT [facility]   4/14/2022  9:30 AM Vince Gonzales[facility]   4/19/2022  9:30 AM Giancarlo Rice PT [facility]

## 2022-03-31 ENCOUNTER — HOSPITAL ENCOUNTER (OUTPATIENT)
Dept: PHYSICAL THERAPY | Age: 62
Discharge: HOME | End: 2022-03-31
Attending: ORTHOPAEDIC SURGERY
Payer: COMMERCIAL

## 2022-03-31 ENCOUNTER — APPOINTMENT (OUTPATIENT)
Dept: PHYSICAL THERAPY | Age: 62
End: 2022-03-31
Attending: ORTHOPAEDIC SURGERY
Payer: COMMERCIAL

## 2022-03-31 PROCEDURE — 97140 MANUAL THERAPY 1/> REGIONS: CPT

## 2022-03-31 PROCEDURE — 97035 APP MDLTY 1+ULTRASOUND EA 15: CPT

## 2022-03-31 PROCEDURE — 97110 THERAPEUTIC EXERCISES: CPT

## 2022-03-31 NOTE — PROGRESS NOTES
PT DAILY TREATMENT NOTE     Patient Name: Graham Nayak. Date:3/31/2022  : 1960  [x]  Patient  Verified  Payor: /    In time:930  Out time:103  Total Treatment Time (min): 61  Visit #: 4 of 10    Medicare/BCBS Only   Total Timed Codes (min):  61 1:1 Treatment Time:  61       Treatment Area: Left shoulder pain [M25.512]    SUBJECTIVE  Pain Level (0-10 scale):  2  Any medication changes, allergies to medications, adverse drug reactions, diagnosis change, or new procedure performed?: [x] No    [] Yes (see summary sheet for update)  Subjective functional status/changes:   [] No changes reported  Feeling looser today than it has been    OBJECTIVE    Modality rationale: decrease inflammation, decrease pain and increase tissue extensibility to improve the patients ability to perform increased activity   Min Type Additional Details    [] Estim:  []Unatt       []IFC  []Premod                        []Other:  []w/ice   []w/heat  Position:  Location:    [] Estim: []Att    []TENS instruct  []NMES                    []Other:  []w/US   []w/ice   []w/heat  Position:  Location:    []  Traction: [] Cervical       []Lumbar                       [] Prone          []Supine                       []Intermittent   []Continuous Lbs:  [] before manual  [] after manual   8 [x]  Ultrasound: [x]Continuous   [] Pulsed                           [x]1MHz   []3MHz W/cm2: 1.3  Location: Left anterior GH joint, Ant Delt    []  Iontophoresis with dexamethasone         Location: [] Take home patch   [] In clinic    []  Ice     []  heat  []  Ice massage  []  Laser   []  Anodyne Position:  Location:    []  Laser with stim  []  Other:  Position:  Location:    []  Vasopneumatic Device    []  Right     []  Left  Pre-treatment girth:  Post-treatment girth:  Measured at (location):  Pressure:       [] lo [] med [] hi   Temperature: [] lo [] med [] hi   [x] Skin assessment post-treatment:  [x]intact []redness- no adverse reaction []redness - adverse reaction:     39 min Therapeutic Exercise:  [x] See flow sheet :   Rationale: increase ROM, increase strength and improve coordination to improve the patients ability to perform usual activity    14 min Manual Therapy:  Left GH joint mobs for elevation, LAD left UE   The manual therapy interventions were performed at a separate and distinct time from the therapeutic activities interventions. Rationale: decrease pain, increase ROM and increase tissue extensibility to tolerate increased activity          With   [x] TE   [] TA   [] neuro   [] other: Patient Education: [x] Review HEP    [] Progressed/Changed HEP based on:   [] positioning   [] body mechanics   [] transfers   [] heat/ice application    [] other:      Other Objective/Functional Measures:   - Minor TTP at the left anterior shoulder at surgical scar  - PROM with Table Str Flx 93* Progressing to 97, ABD 83 progressing to  - Supine AROM Flx 93 ABD 68  - AROM Standing Flx 49 ABD 68     Pain Level (0-10 scale) post treatment: 3    ASSESSMENT/Changes in Function:   - Good exercise participation  - Good effort with treatment program    Patient will continue to benefit from skilled PT services to modify and progress therapeutic interventions, address functional mobility deficits, address ROM deficits, address strength deficits, analyze and address soft tissue restrictions and analyze and cue movement patterns to attain remaining goals.      []  See Plan of Care  []  See progress note/recertification  []  See Discharge Summary         Progress towards goals / Updated goals:  Short Term Goals: To be accomplished in 5 treatments:  1.  Pt will be compliant and independent with HEP in order to facilitate PT sessions and aid with self management             Eval Status:  Initiated             Current Status: Reports compliance (3/29/22)   2.  Pt to tolerate 30 min or more of TE and/or Interventions w/o increased s/s             Eval Status:  Initiated             Current Status:     Long Term Goals: To be accomplished in 10 treatments:  1.  Pt will report 50% improvement or better with Left shoulder dysfunction to show a significant increase in ability to tolerate ADL             Eval Status:  Initiated             Current Status:  2.  Pt will demonstrate PROM to 120* Flx/ABD for progress to AAROM/AROM to 120* with good tolerance and little pain              Eval Status:  PROM left shoulder Flx 84* ABD 77*             Current Status: Flx 97 ABD 83 3/31/22  3.  Pt will demonstrate AROM left shoulder Flx/ABD to 90* for progress to performing light activity at shoulder level with little difficulty               Eval Status:  AROM left shoulder Flx 38* ABD 44*             Current Status: Improved at Flx 49 ABD 68 3/31/22  4.  Pt will have decreased pain at 2/10 with performing activity with the left shoulder for carryover to increased use of the left UE with daily actvity               Eval Status:  Pain at 4/10 periodically              Current Status:  Progressing, reports 3/10 pain throughout TE today.    5.  Pt will improve FOTO score to 65 in 16 visits to show significant improvement for progress to normal function             Eval Status: FOTO = 45             Current Status:     PLAN  [x]  Upgrade activities as tolerated     [x]  Continue plan of care  []  Update interventions per flow sheet       []  Discharge due to:_  []  Other:_      Bev Clifton, PT 3/31/2022  9:39 AM    Future Appointments   Date Time Provider Billie Turcios   4/5/2022  9:30 AM Anjum Roper, PT [facility]   4/7/2022  9:30 AM Anjum Roper, PT [facility]   4/8/2022  8:30 AM JOSE Ndiaye VS BS AMB   4/12/2022  9:30 AM Mason MCGHEE PT [facility]   4/14/2022  9:30 AM Anjum Roper PT [facility]   4/19/2022  9:30 AM Anjum Roper, PT [place] [facility]

## 2022-04-05 ENCOUNTER — APPOINTMENT (OUTPATIENT)
Dept: PHYSICAL THERAPY | Age: 62
End: 2022-04-05
Attending: ORTHOPAEDIC SURGERY
Payer: COMMERCIAL

## 2022-04-05 ENCOUNTER — HOSPITAL ENCOUNTER (OUTPATIENT)
Dept: PHYSICAL THERAPY | Age: 62
Discharge: HOME | End: 2022-04-05
Attending: ORTHOPAEDIC SURGERY
Payer: COMMERCIAL

## 2022-04-05 PROCEDURE — 97110 THERAPEUTIC EXERCISES: CPT

## 2022-04-05 PROCEDURE — 97140 MANUAL THERAPY 1/> REGIONS: CPT

## 2022-04-05 NOTE — PROGRESS NOTES
PT DAILY TREATMENT NOTE     Patient Name: Mya Melara.   Date:2022  : 1960  [x]  Patient  Verified  Payor: /    In time:930  Out time:  Total Treatment Time (min): 46  Visit #: 5 of 10    Medicare/BCBS Only   Total Timed Codes (min):  46 1:1 Treatment Time:  46       Treatment Area: Left shoulder pain [M25.512]    SUBJECTIVE  Pain Level (0-10 scale): 0  Any medication changes, allergies to medications, adverse drug reactions, diagnosis change, or new procedure performed?: [x] No    [] Yes (see summary sheet for update)  Subjective functional status/changes:   [] No changes reported  Feeling looser today than it has been    OBJECTIVE    Modality rationale: decrease inflammation, decrease pain and increase tissue extensibility to improve the patients ability to perform increased activity   Min Type Additional Details    [] Estim:  []Unatt       []IFC  []Premod                        []Other:  []w/ice   []w/heat  Position:  Location:    [] Estim: []Att    []TENS instruct  []NMES                    []Other:  []w/US   []w/ice   []w/heat  Position:  Location:    []  Traction: [] Cervical       []Lumbar                       [] Prone          []Supine                       []Intermittent   []Continuous Lbs:  [] before manual  [] after manual   8 [x]  Ultrasound: [x]Continuous   [] Pulsed                           [x]1MHz   []3MHz W/cm2: 1.3  Location: Left anterior GH joint, Ant Delt    []  Iontophoresis with dexamethasone         Location: [] Take home patch   [] In clinic    []  Ice     []  heat  []  Ice massage  []  Laser   []  Anodyne Position:  Location:    []  Laser with stim  []  Other:  Position:  Location:    []  Vasopneumatic Device    []  Right     []  Left  Pre-treatment girth:  Post-treatment girth:  Measured at (location):  Pressure:       [] lo [] med [] hi   Temperature: [] lo [] med [] hi   [x] Skin assessment post-treatment:  [x]intact []redness- no adverse reaction []redness - adverse reaction:     24 min Therapeutic Exercise:  [x] See flow sheet :   Rationale: increase ROM, increase strength and improve coordination to improve the patients ability to perform usual activity    14 min Manual Therapy:  Left GH joint mobs for elevation, LAD left UE, STM/DTM anterior deltoid/Biceps tendon region   The manual therapy interventions were performed at a separate and distinct time from the therapeutic activities interventions. Rationale: decrease pain, increase ROM and increase tissue extensibility to tolerate increased activity          With   [x] TE   [] TA   [] neuro   [] other: Patient Education: [x] Review HEP    [] Progressed/Changed HEP based on:   [] positioning   [] body mechanics   [] transfers   [] heat/ice application    [] other:      Other Objective/Functional Measures:   - Supine AROM Flx 78 ABD 59  - AROM Standing Flx 68 ABD 63  - AAROM Shoulder Flx at 99*  - Finger Ladder Level 19      Pain Level (0-10 scale) post treatment: 3    ASSESSMENT/Changes in Function:   - Good exercise participation  - Good effort with treatment program  - Pt showing improved AROM Shoulder Flx    Patient will continue to benefit from skilled PT services to modify and progress therapeutic interventions, address functional mobility deficits, address ROM deficits, address strength deficits, analyze and address soft tissue restrictions and analyze and cue movement patterns to attain remaining goals.      []  See Plan of Care  []  See progress note/recertification  []  See Discharge Summary         Progress towards goals / Updated goals:  Short Term Goals: To be accomplished in 5 treatments:  1.  Pt will be compliant and independent with HEP in order to facilitate PT sessions and aid with self management             Eval Status:  Initiated             Current Status: Reports compliance (3/29/22)   2.  Pt to tolerate 30 min or more of TE and/or Interventions w/o increased s/s             Eval Status:  Initiated             Current Status:     Long Term Goals: To be accomplished in 10 treatments:  1.  Pt will report 50% improvement or better with Left shoulder dysfunction to show a significant increase in ability to tolerate ADL             Eval Status:  Initiated             Current Status:  2.  Pt will demonstrate PROM to 120* Flx/ABD for progress to AAROM/AROM to 120* with good tolerance and little pain              Eval Status:  PROM left shoulder Flx 84* ABD 77*             Current Status: Flx 97 ABD 83 3/31/22  3.  Pt will demonstrate AROM left shoulder Flx/ABD to 90* for progress to performing light activity at shoulder level with little difficulty               Eval Status:  AROM left shoulder Flx 38* ABD 44*             Current Status: Improved at Flx 69 ABD 63 4/5/22  4.  Pt will have decreased pain at 2/10 with performing activity with the left shoulder for carryover to increased use of the left UE with daily actvity               Eval Status:  Pain at 4/10 periodically              Current Status:  Progressing, reports 3/10 pain throughout TE today.    5.  Pt will improve FOTO score to 65 in 16 visits to show significant improvement for progress to normal function             Eval Status: FOTO = 45             Current Status:     PLAN  [x]  Upgrade activities as tolerated     [x]  Continue plan of care  []  Update interventions per flow sheet       []  Discharge due to:_  []  Other:_      Kong King PT 4/5/2022  9:40 AM    Future Appointments   Date Time Provider Billie Turcios   4/7/2022  9:30 AM Gus Best PT [facility]   4/8/2022  8:30 AM JOSE Tian VS BS AMB   4/12/2022  9:30 AM Gus Best, PT [facility]   4/14/2022  9:30 AM Gus Best PT [facility]   4/19/2022  9:30 AM Gilberto Bearden PTA [facility]

## 2022-04-06 NOTE — PROGRESS NOTES
Emilia Grajeda.  1960     HISTORY OF PRESENT ILLNESS  Emilia Aponte is a 63 y.o. 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 Patient has been going to PT. Describes pain as a 4/10. Has been taking nothing for pain. Still has night pain. He reports working on his exercises every hour while he is awake. Patient denies any fever, chills, chest pain, shortness of breath or calf pain. There are no new illness or injuries to report since last seen in the office. PHYSICAL EXAM:   Visit Vitals  Pulse 89   Temp 98.2 °F (36.8 °C)   Ht 5' 7\" (1.702 m)   Wt 158 lb (71.7 kg)   SpO2 97%   BMI 24.75 kg/m²      The patient is a well-developed, well-nourished male in no acute distress. The patient is alert and oriented times three. The patient appears to be well groomed. Mood and affect are normal.  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 Talked about what he needs to do to work on this. He will continue exercises at home and in PT. New order placed today. Stressed to patient that nothing causes an increase in pain. Pt not given a refill of pain medication today.   RTC 3 weeks      LEBRON Gonzales [phone]

## 2022-04-07 ENCOUNTER — APPOINTMENT (OUTPATIENT)
Dept: PHYSICAL THERAPY | Age: 62
End: 2022-04-07
Attending: ORTHOPAEDIC SURGERY
Payer: COMMERCIAL

## 2022-04-07 ENCOUNTER — HOSPITAL ENCOUNTER (OUTPATIENT)
Dept: PHYSICAL THERAPY | Age: 62
Discharge: HOME | End: 2022-04-07
Attending: ORTHOPAEDIC SURGERY
Payer: COMMERCIAL

## 2022-04-07 PROCEDURE — 97110 THERAPEUTIC EXERCISES: CPT

## 2022-04-07 PROCEDURE — 97140 MANUAL THERAPY 1/> REGIONS: CPT

## 2022-04-07 NOTE — PROGRESS NOTES
PT DAILY TREATMENT NOTE     Patient Name: Sofie Chávez.   [id number]  : 1960  [x]  Patient  Verified  Payor: /    In time:930  Out time:  Total Treatment Time (min): 46  Visit #: 6 of 10    Medicare/BCBS Only   Total Timed Codes (min):  41 1:1 Treatment Time:  41       Treatment Area: Left shoulder pain [M25.512]    SUBJECTIVE  Pain Level (0-10 scale): 0  Any medication changes, allergies to medications, adverse drug reactions, diagnosis change, or new procedure performed?: [x] No    [] Yes (see summary sheet for update)  Subjective functional status/changes:   [] No changes reported  Just stiff in the morning    OBJECTIVE    Modality rationale: decrease inflammation, decrease pain and increase tissue extensibility to improve the patients ability to perform increased activity   Min Type Additional Details    [] Estim:  []Unatt       []IFC  []Premod                        []Other:  []w/ice   []w/heat  Position:  Location:    [] Estim: []Att    []TENS instruct  []NMES                    []Other:  []w/US   []w/ice   []w/heat  Position:  Location:    []  Traction: [] Cervical       []Lumbar                       [] Prone          []Supine                       []Intermittent   []Continuous Lbs:  [] before manual  [] after manual   8 [x]  Ultrasound: [x]Continuous   [] Pulsed                           []1MHz   [x]3MHz W/cm2: 1.2  Location: Left shoulder at biceps tendon/Ant surgical scar/lateral triceps    []  Iontophoresis with dexamethasone         Location: [] Take home patch   [] In clinic   5 []  Ice     [x]  heat  []  Ice massage  []  Laser   []  Anodyne Position:  Location:    []  Laser with stim  []  Other:  Position:  Location:    []  Vasopneumatic Device    []  Right     []  Left  Pre-treatment girth:  Post-treatment girth:  Measured at (location):  Pressure:       [] lo [] med [] hi   Temperature: [] lo [] med [] hi   [x] Skin assessment post-treatment:  [x]intact []redness- no adverse reaction    []redness - adverse reaction:     8 min Therapeutic Exercise:  [x] See flow sheet :   Rationale: increase ROM, increase strength and improve coordination to improve the patients ability to tolerate increased activity    23 min Manual Therapy:  Left GH joint mobs for elevation, LAD left UE, STM/DTM anterior deltoid/Biceps tendon region/Lateral Triceps, Scar massage at the anterior surgical scar, KT I-Strip for spacing at the left lateral triceps, KT Star pattern at the anterior surgical scar for spacing all at full stretch    The manual therapy interventions were performed at a separate and distinct time from the therapeutic activities interventions. Rationale: decrease pain, increase ROM and increase tissue extensibility to tolerate increased activity            With   [x] TE   [] TA   [] neuro   [] other: Patient Education: [x] Review HEP    [] Progressed/Changed HEP based on:   [] positioning   [] body mechanics   [] transfers   [] heat/ice application    [] other:      Other Objective/Functional Measures:   - TTP with multiple TrPs and muscle tightness at the left Biceps Tendon region/Ant Delt and lateral triceps  - TTP at the anterior scar with binding     Pain Level (0-10 scale) post treatment: 2    ASSESSMENT/Changes in Function:   - AROM Post Treatment Flx 62 ABD 75     Patient will continue to benefit from skilled PT services to modify and progress therapeutic interventions, address functional mobility deficits, address ROM deficits, address strength deficits, analyze and address soft tissue restrictions and analyze and cue movement patterns to attain remaining goals.      []  See Plan of Care  []  See progress note/recertification  []  See Discharge Summary         Progress towards goals / Updated goals:  Short Term Goals: To be accomplished in 5 treatments:  1.  Pt will be compliant and independent with HEP in order to facilitate PT sessions and aid with self management             Eval Status:  Initiated             Current Status: Reports compliance (3/29/22)   2.  Pt to tolerate 30 min or more of TE and/or Interventions w/o increased s/s             Eval Status:  Initiated             Current Status: Pt tolerated 46 min of treatment with increased s/s 4/7/22     Long Term Goals: To be accomplished in 10 treatments:  1.  Pt will report 50% improvement or better with Left shoulder dysfunction to show a significant increase in ability to tolerate ADL             Eval Status:  Initiated             Current Status:  2.  Pt will demonstrate PROM to 120* Flx/ABD for progress to AAROM/AROM to 120* with good tolerance and little pain              Eval Status:  PROM left shoulder Flx 84* ABD 77*             Current Status: Flx 97 ABD 83 3/31/22  3.  Pt will demonstrate AROM left shoulder Flx/ABD to 90* for progress to performing light activity at shoulder level with little difficulty               Eval Status:  AROM left shoulder Flx 38* ABD 44*             Current Status: Improved at Flx 62 ABD 75 4/7/22  4.  Pt will have decreased pain at 2/10 with performing activity with the left shoulder for carryover to increased use of the left UE with daily actvity               Eval Status:  Pain at 4/10 periodically              Current Status:  Progressing, reports 3/10 pain throughout TE today.   5.  Pt will improve FOTO score to 65 in 16 visits to show significant improvement for progress to normal function             Eval Status: FOTO = 45             Current Status:     PLAN  [x]  Upgrade activities as tolerated     [x]  Continue plan of care  []  Update interventions per flow sheet       []  Discharge due to:_  []  Other:_      Jeremy Griffin PT 4/7/2022  9:38 AM    Future Appointments   Date Time Provider Billie Turcios   4/8/2022  8:30 AM JOSE Can [facility] AMB   4/12/2022  9:30 AM Niles Hansen, PT [facility]   4/14/2022  9:30 AM Niles Hansen PT [facility]   4/19/2022  9:30 AM Sammi Singletary PTA [facility]

## 2022-04-07 NOTE — PROGRESS NOTES
[facility] at [address]  [address]  Phone: [phone]      Fax:  [phone]    Progress Note  Patient name: Lady Ivory Start of Care: 3/21/2022   Referral source: Tiffani Otto MD : 1960               Medical Diagnosis: Left shoulder pain [M25.512]  Payor: [facility] / Plan: [facility] PPO / Product Type: PPO /  Onset Date:3/1022               Treatment Diagnosis: Left Shoulder Pain   Prior Hospitalization: see medical history Provider#: [id number]   Medications: Verified on Patient summary List   Comorbidities: None noted  Prior Level of Function: Able to use the left shoulder to perform normal activity and work tasks    Visits from Start of Care: 6    Missed Visits: 0    Established Goals:          Excellent Good         Limited           None  [] Increased ROM   []  [x]  [x]  []  [] Increased Strength  []  [x]  [x]  []  [] Increased Mobility  []  [x]  [x]  []   [] Decreased Pain   []  [x]  []  []  [] Decreased Swelling  []  []  []  []    Key Functional Changes: Patient has shown fair - good progress with this treatment program. Pain as of last visit was 0/10 increasing to 2/10 after treatment. Primary painful area is at the anterior shoulder anterior surgical scar/Biceps Tendon Region and lateral Triceps. The anterior surgical scar has some tissue binding that is being addressed. Progressing with PROM shoulder Flx/ABD at ~ 90* with discomfort. Patient has shown decreased pain and increased strength and mobility however, it is still limited. AROM today Shoulder Flx 62* ABD/Scap 75*  Patient reports improvement with overall involvement but still stiff with limited ROM and function of the left UE. All STG/LTGs achieved as identified below.         Progress towards goals / Updated goals:  Short Term Goals: To be accomplished in 5 treatments:  1.  Pt will be compliant and independent with HEP in order to facilitate PT sessions and aid with self management             Eval Status:  Initiated             Current Status: Reports compliance (3/29/22)   2.  Pt to tolerate 30 min or more of TE and/or Interventions w/o increased s/s             Eval Status:  Initiated             Current Status: Pt tolerated 46 min of treatment with increased s/s 4/7/22     Long Term Goals: To be accomplished in 10 treatments:  1.  Pt will report 50% improvement or better with Left shoulder dysfunction to show a significant increase in ability to tolerate ADL             Eval Status:  Initiated             Current Status:  2.  Pt will demonstrate PROM to 120* Flx/ABD for progress to AAROM/AROM to 120* with good tolerance and little pain              Eval Status:  PROM left shoulder Flx 84* ABD 77*             Current Status: Flx 97 ABD 83 3/31/22  3.  Pt will demonstrate AROM left shoulder Flx/ABD to 90* for progress to performing light activity at shoulder level with little difficulty               Eval Status:  AROM left shoulder Flx 38* ABD 44*             Current Status: Improved at [address] ABD 75 4/7/22  4.  Pt will have decreased pain at 2/10 with performing activity with the left shoulder for carryover to increased use of the left UE with daily actvity               Eval Status:  Pain at 4/10 periodically              Current Status:  Progressing, reports 3/10 pain throughout TE today.   5.  Pt will improve FOTO score to 65 in 16 visits to show significant improvement for progress to normal function             Eval Status: FOTO = 45             Current Status:     Updated Goals: to be achieved in 10 treatments:  1.  Pt to tolerate 30 min or more of TE and/or Interventions w/o increased s/s              Status at last note/certification:   Current Status:              Eval Status:  Initiated             Current Status: Pt tolerated 46 min of treatment with increased s/s 4/7/22   2.  Pt will report 50% improvement or better with Left shoulder dysfunction to show a significant increase in ability to tolerate ADL               Status at last note/certification:   Current Status:             Eval Status:  Initiated             Current Status: Not assess at this point  3.  Pt will demonstrate PROM to 120* Flx/ABD for progress to AAROM/AROM to 120* with good tolerance and little pain               Status at last note/certification:   Current Status:              Eval Status:  PROM left shoulder Flx 84* ABD 77*             Current Status: Flx 97 ABD 83 3/31/22  4.  Pt will demonstrate AROM left shoulder Flx/ABD to 90* for progress to performing light activity at shoulder level with little difficulty                  Status at last note/certification:   Current Status:            Eval Status:  AROM left shoulder Flx 38* ABD 44*             Current Status: Improved at [address] ABD 75 4/7/22  5.  Pt will have decreased pain at 2/10 with performing activity with the left shoulder for carryover to increased use of the left UE with daily actvity                Status at last note/certification:   Current Status:              Eval Status:  Pain at 4/10 periodically              Current Status:  Progressing, reports 3/10 pain throughout TE today.   6.  Pt will improve FOTO score to 65 in 16 visits to show significant improvement for progress to normal function              Status at last note/certification:   Current Status:             Eval Status: FOTO = 45             Current Status:     ASSESSMENT/RECOMMENDATIONS:  [x]Continue therapy per initial plan/protocol at a frequency of  2-3 x per week for 10 visits  []Continue therapy with the following recommended changes:_____________________      _____________________________________________________________________  []Discontinue therapy progressing towards or have reached established goals  []Discontinue therapy due to lack of appreciable progress towards goals  []Discontinue therapy due to lack of attendance or compliance  []Await Physician's recommendations/decisions regarding therapy  []Other:________________________________________________________________    Thank you for this referral.   Tanika Vu, PT 4/7/2022 10:30 AM  NOTE TO PHYSICIAN:  PLEASE COMPLETE THE ORDERS BELOW AND   FAX TO [facility]: [phone]  If you are unable to process this request in 24 hours please contact our office:   [phone]  []  I have read the above report and request that my patient continue as recommended. []  I have read the above report and request that my patient continue therapy with the following changes/special instructions:________________________________________  []I have read the above report and request that my patient be discharged from therapy.     Physician's Signature:____________Date:_________TIME:________     Marcie Ritter MD  ** Signature, Date and Time must be completed for valid certification **

## 2022-04-08 ENCOUNTER — OFFICE VISIT (OUTPATIENT)
Dept: ORTHOPEDIC SURGERY | Age: 62
End: 2022-04-08
Payer: COMMERCIAL

## 2022-04-08 VITALS
HEIGHT: 67 IN | OXYGEN SATURATION: 97 % | BODY MASS INDEX: 24.8 KG/M2 | TEMPERATURE: 98.2 F | HEART RATE: 89 BPM | WEIGHT: 158 LBS

## 2022-04-08 DIAGNOSIS — Z98.890 STATUS POST ARTHROSCOPY OF LEFT SHOULDER: Primary | ICD-10-CM

## 2022-04-08 DIAGNOSIS — M25.612 SHOULDER STIFFNESS, LEFT: ICD-10-CM

## 2022-04-08 PROCEDURE — 99024 POSTOP FOLLOW-UP VISIT: CPT | Performed by: ORTHOPAEDIC SURGERY

## 2022-04-12 ENCOUNTER — APPOINTMENT (OUTPATIENT)
Dept: PHYSICAL THERAPY | Age: 62
End: 2022-04-12
Attending: ORTHOPAEDIC SURGERY
Payer: COMMERCIAL

## 2022-04-14 ENCOUNTER — APPOINTMENT (OUTPATIENT)
Dept: PHYSICAL THERAPY | Age: 62
End: 2022-04-14
Attending: ORTHOPAEDIC SURGERY
Payer: COMMERCIAL

## 2022-04-19 ENCOUNTER — APPOINTMENT (OUTPATIENT)
Dept: PHYSICAL THERAPY | Age: 62
End: 2022-04-19
Attending: ORTHOPAEDIC SURGERY
Payer: COMMERCIAL

## 2022-04-19 ENCOUNTER — HOSPITAL ENCOUNTER (OUTPATIENT)
Dept: PHYSICAL THERAPY | Age: 62
Discharge: HOME | End: 2022-04-19
Attending: ORTHOPAEDIC SURGERY
Payer: COMMERCIAL

## 2022-04-19 PROCEDURE — 97110 THERAPEUTIC EXERCISES: CPT

## 2022-04-19 PROCEDURE — 97530 THERAPEUTIC ACTIVITIES: CPT

## 2022-04-19 PROCEDURE — 97035 APP MDLTY 1+ULTRASOUND EA 15: CPT

## 2022-04-19 PROCEDURE — 97140 MANUAL THERAPY 1/> REGIONS: CPT

## 2022-04-19 NOTE — PROGRESS NOTES
PT DAILY TREATMENT NOTE     Patient Name: Logan Lala.   Date:2022  : 1960  [x]  Patient  Verified  Payor: /    In time:934  Out time:56  Total Treatment Time (min): 56  Visit #: 1 of 10    Medicare/BCBS Only   Total Timed Codes (min):  56 1:1 Treatment Time:  64       Treatment Area: Left shoulder pain [M25.512]    SUBJECTIVE  Pain Level (0-10 scale): 4/10  Any medication changes, allergies to medications, adverse drug reactions, diagnosis change, or new procedure performed?: [x] No    [] Yes (see summary sheet for update)  Subjective functional status/changes:   [] No changes reported  Reports increased pain today, patient missed last weeks visits d/t kidney stones     OBJECTIVE    Modality rationale: decrease inflammation, decrease pain and increase tissue extensibility to improve the patients ability to perform ADLs   Min Type Additional Details    [] Estim:  []Unatt       []IFC  []Premod                        []Other:  []w/ice   []w/heat  Position:  Location:    [] Estim: []Att    []TENS instruct  []NMES                    []Other:  []w/US   []w/ice   []w/heat  Position:  Location:    []  Traction: [] Cervical       []Lumbar                       [] Prone          []Supine                       []Intermittent   []Continuous Lbs:  [] before manual  [] after manual   8 [x]  Ultrasound: [x]Continuous   [] Pulsed                           []1MHz   [x]3MHz W/cm2: 1.2  Location: Left shoulder at biceps tendon/Ant surgical scar/lateral triceps     []  Iontophoresis with dexamethasone         Location: [] Take home patch   [] In clinic    []  Ice     []  heat  []  Ice massage  []  Laser   []  Anodyne Position:  Location:    []  Laser with stim  []  Other:  Position:  Location:    []  Vasopneumatic Device    []  Right     []  Left  Pre-treatment girth:  Post-treatment girth:  Measured at (location):  Pressure:       [] lo [] med [] hi   Temperature: [] lo [] med [] hi   [] Skin assessment post-treatment:  []intact []redness- no adverse reaction    []redness - adverse reaction:     28 min Therapeutic Exercise:  [x] See flow sheet :   Rationale: increase ROM and increase strength to improve the patients ability to perform ADLs    10 min Therapeutic Activity:  [x]  See flow sheet :   Rationale: increase ROM, increase strength and improve coordination  to improve the patients ability to perform ADLs     10 min Manual Therapy:  Left GH joint mobs, LAD Left UE, STM/DTM anterior delt/biceps tendon/lateral triceps    The manual therapy interventions were performed at a separate and distinct time from the therapeutic activities interventions. Rationale: decrease pain, increase ROM and increase tissue extensibility to tolerate increased activity         With   [x] TE   [] TA   [] neuro   [] other: Patient Education: [x] Review HEP    [] Progressed/Changed HEP based on:   [] positioning   [] body mechanics   [] transfers   [] heat/ice application    [] other:      Other Objective/Functional Measures:   - TTP with tightness in the anterior delt/biceps tendon       Pain Level (0-10 scale) post treatment: 2/10    ASSESSMENT/Changes in Function: Patient actively participates in therapy and tolerates progressions well with no c/o increased pain       Patient will continue to benefit from skilled PT services to modify and progress therapeutic interventions, address functional mobility deficits, address ROM deficits, address strength deficits, analyze and address soft tissue restrictions and analyze and cue movement patterns to attain remaining goals.      []  See Plan of Care  []  See progress note/recertification  []  See Discharge Summary         Progress towards goals / Updated goals:  Updated Goals: to be achieved in 10 treatments:  1.  Pt to tolerate 30 min or more of TE and/or Interventions w/o increased s/s                        Status at last note/certification: Pt tolerated 46 min of treatment with increased s/s 4/7/22               Current Status:                 2.  Pt will report 50% improvement or better with Left shoulder dysfunction to show a significant increase in ability to tolerate ADL             Status at last note/certification: Not assess at this point             Current Status:               3.  Pt will demonstrate PROM to 120* Flx/ABD for progress to AAROM/AROM to 120* with good tolerance and little pain              Status at last note/certification:  Flx 97 ABD 83 3/31/22             Current Status:  4.  Pt will demonstrate AROM left shoulder Flx/ABD to 90* for progress to performing light activity at shoulder level with little difficulty              NBAFSH at last note/certification: Improved at Flx 62 ABD 75 4/7/22             Current Status:   5.  Pt will have decreased pain at 2/10 with performing activity with the left shoulder for carryover to increased use of the left UE with daily actvity              Status at last note/certification:  Progressing, reports 3/10 pain throughout TE today.               Current Status:   6.  Pt will improve FOTO score to 65 in 16 visits to show significant improvement for progress to normal function             Status at last note/certification: FOTO = 45             Current Status:                    PLAN  [x]  Upgrade activities as tolerated     [x]  Continue plan of care  []  Update interventions per flow sheet       []  Discharge due to:_  []  Other:_      Jenn Rod PTA 4/19/2022  9:48 AM    Future Appointments   Date Time Provider Billie Turcios   4/29/2022  8:30 AM JOSE Orozco [facility] BS AMB

## 2022-04-26 ENCOUNTER — HOSPITAL ENCOUNTER (OUTPATIENT)
Dept: PHYSICAL THERAPY | Age: 62
Discharge: HOME | End: 2022-04-26
Attending: ORTHOPAEDIC SURGERY
Payer: COMMERCIAL

## 2022-04-26 PROCEDURE — 97530 THERAPEUTIC ACTIVITIES: CPT

## 2022-04-26 PROCEDURE — 97110 THERAPEUTIC EXERCISES: CPT

## 2022-04-26 PROCEDURE — 97140 MANUAL THERAPY 1/> REGIONS: CPT

## 2022-04-26 PROCEDURE — 97035 APP MDLTY 1+ULTRASOUND EA 15: CPT

## 2022-04-26 NOTE — PROGRESS NOTES
PT DAILY TREATMENT NOTE     Patient Name: Butch Lopez.   Date:2022  : 1960  [x]  Patient  Verified  Payor: BLUE CROSS / Plan: Juanilya JACKLYN Yoseph [id number] PPO / Product Type: PPO /    In time:930  Out time:1025  Total Treatment Time (min): 55  Visit #: 2 of 10    Medicare/BCBS Only   Total Timed Codes (min):  55 1:1 Treatment Time:  55       Treatment Area: Left shoulder pain [M25.512]    SUBJECTIVE  Pain Level (0-10 scale): 3/10  Any medication changes, allergies to medications, adverse drug reactions, diagnosis change, or new procedure performed?: [x] No    [] Yes (see summary sheet for update)  Subjective functional status/changes:   [] No changes reported  Reports 3/10 pain today with stiffness     OBJECTIVE    Modality rationale: decrease inflammation, decrease pain and increase tissue extensibility to improve the patients ability to perform ADLs    Min Type Additional Details    [] Estim:  []Unatt       []IFC  []Premod                        []Other:  []w/ice   []w/heat  Position:  Location:    [] Estim: []Att    []TENS instruct  []NMES                    []Other:  []w/US   []w/ice   []w/heat  Position:  Location:    []  Traction: [] Cervical       []Lumbar                       [] Prone          []Supine                       []Intermittent   []Continuous Lbs:  [] before manual  [] after manual   8 [x]  Ultrasound: [x]Continuous   [] Pulsed                           []1MHz   [x]3MHz W/cm2: 1.2  Location: Left shoulder at biceps tendon/ant surgical scar/lateral triceps     []  Iontophoresis with dexamethasone         Location: [] Take home patch   [] In clinic    []  Ice     []  heat  []  Ice massage  []  Laser   []  Anodyne Position:  Location:    []  Laser with stim  []  Other:  Position:  Location:    []  Vasopneumatic Device    []  Right     []  Left  Pre-treatment girth:  Post-treatment girth:  Measured at (location):  Pressure:       [] lo [] med [] hi   Temperature: [] lo [] med [] hi   [x] Skin assessment post-treatment:  [x]intact []redness- no adverse reaction    []redness - adverse reaction:     25 min Therapeutic Exercise:  [] See flow sheet :   Rationale: increase ROM and increase strength to improve the patients ability to perform ADLs    10 min Therapeutic Activity:  []  See flow sheet :   Rationale: increase ROM, increase strength and improve coordination  to improve the patients ability to perform ADLs     12 min Manual Therapy:  Left GH joint mobs, LAD Left UE, STM/DTM anterior delt/biceps tendon/lateral triceps    The manual therapy interventions were performed at a separate and distinct time from the therapeutic activities interventions. Rationale: decrease pain, increase ROM, increase tissue extensibility and decrease trigger points to perform ADLs        With   [x] TE   [] TA   [] neuro   [] other: Patient Education: [x] Review HEP    [] Progressed/Changed HEP based on:   [] positioning   [] body mechanics   [] transfers   [] heat/ice application    [] other:      Other Objective/Functional Measures:   - Increased finger ladder to 10x5\" Lvl 20    - Continued TTP with tightness in left biceps tendon     Pain Level (0-10 scale) post treatment: 3/10    ASSESSMENT/Changes in Function: Patient actively participates in therapy and tolerates progressions well with no c/o increased pain      Patient will continue to benefit from skilled PT services to modify and progress therapeutic interventions, address functional mobility deficits, address ROM deficits, address strength deficits, analyze and address soft tissue restrictions and analyze and cue movement patterns to attain remaining goals.      []  See Plan of Care  []  See progress note/recertification  []  See Discharge Summary         Progress towards goals / Updated goals:  Updated Goals: to be achieved in 10 treatments:  1.  Pt to tolerate 30 min or more of TE and/or Interventions w/o increased s/s                        MARVIN at last note/certification: Pt tolerated 46 min of treatment with increased s/s 4/7/22                                    Current Status:                 2.  Pt will report 50% improvement or better with Left shoulder dysfunction to show a significant increase in ability to tolerate ADL             Status at last note/certification: Not assess at this point             Current Status:               3.  Pt will demonstrate PROM to 120* Flx/ABD for progress to AAROM/AROM to 120* with good tolerance and little pain              Status at last note/certification:  Flx 97 ABD 83 3/31/22             Current Status:  4.  Pt will demonstrate AROM left shoulder Flx/ABD to 90* for progress to performing light activity at shoulder level with little difficulty              SXLSMJ at last note/certification: Improved at Flx 62 ABD 75 4/7/22             Current Status:   5.  Pt will have decreased pain at 2/10 with performing activity with the left shoulder for carryover to increased use of the left UE with daily actvity              Status at last note/certification:  Progressing, reports 3/10 pain throughout TE today.               Current Status:   6.  Pt will improve FOTO score to 65 in 16 visits to show significant improvement for progress to normal function             Status at last note/certification: FOTO = 45             Current Status:          PLAN  [x]  Upgrade activities as tolerated     [x]  Continue plan of care  []  Update interventions per flow sheet       []  Discharge due to:_  []  Other:_      Adriel Huerta, DAYSI 4/26/2022  9:37 AM    Future Appointments   Date Time Provider Billie Turcios   4/28/2022  8:45 AM Soledad Maya PTA [facility]   4/29/2022  8:30 AM [facility] AM[facility]   5/2/2022  8:00 AM Samir Lobato MD [facility] BS AMB   5/3/2022  9:30 AM Soledad Maya PTA [facility]   5/5/2022  9:30 AM Carlo Webster PT [facility]   5/10/2022  9:30 AM Chance Gaming PTA [facility]   5/12/2022  9:30 AM Chance Gaming PTA [facility]   5/17/2022  9:30 AM Chance Gaming PTA [facility]   5/19/2022  9:30 AM Chance Gaming PTA [facility]   5/24/2022  9:30 AM Gina Arnold, PT [facility]   5/26/2022  9:30 AM Chance Gaming PTA [facility]

## 2022-04-27 NOTE — PROGRESS NOTES
Mari Manning.  1960     HISTORY OF PRESENT ILLNESS  Mari Manning. is a 63 y.o. 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 Patient has been going to PT. Describes pain as a 4/10. Has been taking nothing for pain. Still has night pain. He reports working on his exercises every hour while he is awake. He is doing all he can with his arm to include yard work and things around the house. Patient denies any fever, chills, chest pain, shortness of breath or calf pain. There are no new illness or injuries to report since last seen in the office. PHYSICAL EXAM:   There were no vitals taken for this visit. The patient is a well-developed, well-nourished male in no acute distress. The patient is alert and oriented times three. The patient appears to be well groomed. Mood and affect are normal.  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 He will continue exercises at home and in PT. New order placed today. Stressed to patient that nothing causes an increase in pain. Pt not given a refill of pain medication today.   RTC 3 weeks      LEBRON Bains [facility]

## 2022-04-28 ENCOUNTER — HOSPITAL ENCOUNTER (OUTPATIENT)
Dept: PHYSICAL THERAPY | Age: 62
Discharge: HOME | End: 2022-04-28
Attending: ORTHOPAEDIC SURGERY
Payer: COMMERCIAL

## 2022-04-28 PROCEDURE — 97110 THERAPEUTIC EXERCISES: CPT

## 2022-04-28 PROCEDURE — 97035 APP MDLTY 1+ULTRASOUND EA 15: CPT

## 2022-04-28 PROCEDURE — 97530 THERAPEUTIC ACTIVITIES: CPT

## 2022-04-28 PROCEDURE — 97140 MANUAL THERAPY 1/> REGIONS: CPT

## 2022-04-28 NOTE — PROGRESS NOTES
PT DAILY TREATMENT NOTE     Patient Name: Lindsay Patricio.   Date:2022  : 1960  [x]  Patient  Verified  Payor: BLUE CROSS / Plan: Manolocrispin Hameed [id number] PPO / Product Type: PPO /    In time:843  Out time:942  Total Treatment Time (min): 61  Visit #: 3 of 10    Medicare/BCBS Only   Total Timed Codes (min):  54 1:1 Treatment Time:  47       Treatment Area: Left shoulder pain [M25.512]    SUBJECTIVE  Pain Level (0-10 scale): 3/10  Any medication changes, allergies to medications, adverse drug reactions, diagnosis change, or new procedure performed?: [x] No    [] Yes (see summary sheet for update)  Subjective functional status/changes:   [] No changes reported  Reports 3/10 pain with stiffness     OBJECTIVE    Modality rationale: decrease inflammation, decrease pain and increase tissue extensibility to improve the patients ability to perform ADLs   Min Type Additional Details    [] Estim:  []Unatt       []IFC  []Premod                        []Other:  []w/ice   []w/heat  Position:  Location:    [] Estim: []Att    []TENS instruct  []NMES                    []Other:  []w/US   []w/ice   []w/heat  Position:  Location:    []  Traction: [] Cervical       []Lumbar                       [] Prone          []Supine                       []Intermittent   []Continuous Lbs:  [] before manual  [] after manual   8 [x]  Ultrasound: [x]Continuous   [] Pulsed                           []1MHz   [x]3MHz W/cm2: 1.3  Location: left anterior shoulder, left biceps tendon     []  Iontophoresis with dexamethasone         Location: [] Take home patch   [] In clinic   5 []  Ice     [x]  heat  []  Ice massage  []  Laser   []  Anodyne Position: Long sitting  Location: left shoulder     []  Laser with stim  []  Other:  Position:  Location:    []  Vasopneumatic Device    []  Right     []  Left  Pre-treatment girth:  Post-treatment girth:  Measured at (location):  Pressure:       [] lo [] med [] hi   Temperature: [] lo [] med [] hi   [x] Skin assessment post-treatment:  [x]intact []redness- no adverse reaction    []redness - adverse reaction:     21 min Therapeutic Exercise:  [x] See flow sheet :   Rationale: increase ROM and increase strength to improve the patients ability to perform ADLs    10 min Therapeutic Activity:  [x]  See flow sheet :   Rationale: increase ROM, increase strength and improve coordination  to improve the patients ability to perform ADLs    15 min Manual Therapy:  STM/DTM/TPR left anterior shoulder/biceps tendon, GH joint mobs, PROM with overstretch of left shoulder    The manual therapy interventions were performed at a separate and distinct time from the therapeutic activities interventions. Rationale: decrease pain, increase ROM, increase tissue extensibility and decrease trigger points to perform ADLs          With   [] TE   [] TA   [] neuro   [] other: Patient Education: [x] Review HEP    [] Progressed/Changed HEP based on:   [] positioning   [] body mechanics   [] transfers   [] heat/ice application    [] other:      Other Objective/Functional Measures:   - Increased table slides to 2x10   - PROM left shoulder   - Flexion 114   - Abduction 86  - AROM Left shoulder    - abduction 65   - scaption 87     Pain Level (0-10 scale) post treatment: 3/10    ASSESSMENT/Changes in Function: Patient tolerates treatment well with no c/o increased pain besides discomfort with MT. Patient seeing MD tomorrow for follow-up     Patient will continue to benefit from skilled PT services to modify and progress therapeutic interventions, address functional mobility deficits, address ROM deficits, address strength deficits, analyze and address soft tissue restrictions and analyze and cue movement patterns to attain remaining goals.      []  See Plan of Care  []  See progress note/recertification  []  See Discharge Summary         Progress towards goals / Updated goals:  Updated Goals: to be achieved in 10 treatments:  1.  Pt to tolerate 30 min or more of TE and/or Interventions w/o increased s/s                        Status at last note/certification: Pt tolerated 46 min of treatment with increased s/s 4/7/22                                    Current Status:                 2.  Pt will report 50% improvement or better with Left shoulder dysfunction to show a significant increase in ability to tolerate ADL             Status at last note/certification: Not assess at this point             Current Status:               3.  Pt will demonstrate PROM to 120* Flx/ABD for progress to AAROM/AROM to 120* with good tolerance and little pain              Status at last note/certification:  Flx 97 ABD 83 3/31/22             Current Status:  4.  Pt will demonstrate AROM left shoulder Flx/ABD to 90* for progress to performing light activity at shoulder level with little difficulty              BAEKUJ at last note/certification: Improved at [facility] 75 4/7/22             Current Status:   5.  Pt will have decreased pain at 2/10 with performing activity with the left shoulder for carryover to increased use of the left UE with daily actvity              Status at last note/certification:  Progressing, reports 3/10 pain throughout TE today.               Current Status: Continues to report 3/10 pain at the start and end of tx (4/28/22)   6.  Pt will improve FOTO score to 65 in 16 visits to show significant improvement for progress to normal function             Status at last note/certification: FOTO = 45             Current Status:       PLAN  [x]  Upgrade activities as tolerated     [x]  Continue plan of care  []  Update interventions per flow sheet       []  Discharge due to:_  []  Other:_      Lorna Issa PTA 4/28/2022  8:55 AM    Future Appointments   Date Time Provider Billie Turcios   4/29/2022  8:30 AM JOSE [facility] BS AMB   5/2/2022  8:00 AM Keely Gallardo MD [facility] BS AMB   5/3/2022  9:30 AM Nancy Parkinson PTA [facility] 5/5/2022  9:30 AM Fernandez MCGHEE, PT [facility]   5/10/2022  9:30 AM Iram Park, DAYSI [facility]   5/12/2022  9:30 AM Iram Park, DAYSI [facility]   5/17/2022  9:30 AM Iram Park, DAYSI [facility]   5/19/2022  9:30 AM Iram Park, DAYSI [facility]   5/24/2022  9:30 AM Rubio Henning, PT [facility]   5/26/2022  9:30 AM Iram Park, DAYSI [facility]

## 2022-04-29 ENCOUNTER — OFFICE VISIT (OUTPATIENT)
Dept: ORTHOPEDIC SURGERY | Age: 62
End: 2022-04-29
Payer: COMMERCIAL

## 2022-04-29 DIAGNOSIS — M25.612 SHOULDER STIFFNESS, LEFT: ICD-10-CM

## 2022-04-29 DIAGNOSIS — Z98.890 STATUS POST ARTHROSCOPY OF LEFT SHOULDER: Primary | ICD-10-CM

## 2022-04-29 PROCEDURE — 99024 POSTOP FOLLOW-UP VISIT: CPT | Performed by: ORTHOPAEDIC SURGERY

## 2022-05-02 ENCOUNTER — OFFICE VISIT (OUTPATIENT)
Dept: FAMILY MEDICINE CLINIC | Age: 62
End: 2022-05-02
Payer: COMMERCIAL

## 2022-05-02 VITALS
RESPIRATION RATE: 12 BRPM | OXYGEN SATURATION: 98 % | HEIGHT: 67 IN | TEMPERATURE: 97.1 F | DIASTOLIC BLOOD PRESSURE: 70 MMHG | SYSTOLIC BLOOD PRESSURE: 126 MMHG | BODY MASS INDEX: 24.45 KG/M2 | HEART RATE: 69 BPM | WEIGHT: 155.8 LBS

## 2022-05-02 DIAGNOSIS — E55.9 VITAMIN D DEFICIENCY: ICD-10-CM

## 2022-05-02 PROCEDURE — 99213 OFFICE O/P EST LOW 20 MIN: CPT | Performed by: FAMILY MEDICINE

## 2022-05-02 RX ORDER — ERGOCALCIFEROL 1.25 MG/1
50000 CAPSULE ORAL
Qty: 12 CAPSULE | Refills: 1 | Status: SHIPPED | OUTPATIENT
Start: 2022-05-02 | End: 2022-08-04 | Stop reason: SDUPTHER

## 2022-05-02 NOTE — PROGRESS NOTES
Patient made this appt for blood in his urine but had a procedure done with Urology and has talked with that office about the blood in his urine. He says there are some things that he would like to discuss with provider. 1. \"Have you been to the ER, urgent care clinic since your last visit? Hospitalized since your last visit? \" [facility] for kidney stone    2. \"Have you seen or consulted any other health care providers outside of the [address] [place] [address] since your last visit? \" Has seen Urology     3. For patients aged 39-70: Has the patient had a colonoscopy / FIT/ Cologuard? No Needs referral placed. If the patient is female:    4. For patients aged 41-77: Has the patient had a mammogram within the past 2 years? NA - based on age or sex      11. For patients aged 21-65: Has the patient had a pap smear?  NA - based on age or sex

## 2022-05-02 NOTE — PROGRESS NOTES
Mari Mathews is a 63 y.o. male  presents for follow up. He has HTN and multiple shoulder surgeries. No Known Allergies    Patient Active Problem List   Diagnosis Code    Advanced directives, counseling/discussion Z71.89    Elevated blood pressure [id number]    Pain of right hip joint M25.551    Chronic right shoulder pain M25.511, G89.29    Flank pain R10.9     Past Medical History:   Diagnosis Date    Hypertension      Social History     Socioeconomic History    Marital status:    Tobacco Use    Smoking status: Never Smoker    Smokeless tobacco: Never Used   Vaping Use    Vaping Use: Never used   Substance and Sexual Activity    Alcohol use: Yes     Alcohol/week: 8.0 standard drinks     Types: 8 Cans of beer per week     Comment: weekends    Drug use: No    Sexual activity: Yes     No family history on file. Review of Systems   Constitutional: Negative for chills, fever, malaise/fatigue and weight loss. Eyes: Negative for blurred vision. Respiratory: Negative for cough, shortness of breath and wheezing. Cardiovascular: Negative for chest pain. Gastrointestinal: Negative for nausea and vomiting. Musculoskeletal: Negative for myalgias. Skin: Negative for rash. Neurological: Negative for weakness. Vitals:    05/02/22 0811   BP: 126/70   Pulse: 69   Resp: 12   Temp: 97.1 °F (36.2 °C)   TempSrc: Tympanic   SpO2: 98%   Weight: 155 lb 12.8 oz (70.7 kg)   Height: 5' 7\" (1.702 m)   PainSc:   0 - No pain       Physical Exam  Vitals and nursing note reviewed. Constitutional:       Appearance: Normal appearance. He is normal weight. Neck:      Thyroid: No thyromegaly. Cardiovascular:      Rate and Rhythm: Normal rate and regular rhythm. Pulses: Normal pulses. Heart sounds: Normal heart sounds. Pulmonary:      Effort: Pulmonary effort is normal.      Breath sounds: Normal breath sounds. Musculoskeletal:         General: Normal range of motion.       Cervical back: Normal range of motion and neck supple. Skin:     General: Skin is warm and dry. Neurological:      Mental Status: He is alert and oriented to person, place, and time. Psychiatric:         Mood and Affect: Mood normal.         Behavior: Behavior normal.         Thought Content: Thought content normal.         Judgment: Judgment normal.         Assessment/Plan      ICD-10-CM ICD-9-CM    1. Vitamin D deficiency  E55.9 268.9 ergocalciferol (Vitamin D2) 1,250 mcg (50,000 unit) capsule     I have discussed the diagnosis with the patient and the intended plan of care as seen in the above orders. The patient has received an after-visit summary and questions were answered concerning future plans. I have discussed medication, side effects, and warnings with the patient in detail. The patient verbalized understanding and is in agreement with the plan of care. The patient will contact the office with any additional concerns.         lab results and schedule of future lab studies reviewed with patient    Marj Starkey MD

## 2022-05-02 NOTE — PATIENT INSTRUCTIONS
Learning About Vitamin D  Why is it important to get enough vitamin D? Your body needs vitamin D to absorb calcium. Calcium keeps your bones and muscles, including your heart, healthy and strong. If your muscles don't get enough calcium, they can cramp, hurt, or feel weak. You may have long-term (chronic) muscle aches and pains. If you don't get enough vitamin D throughout life, you have an increased chance of having thin and brittle bones (osteoporosis) in your later years. Children who don't get enough vitamin D may not grow as much as others their age. They also have a chance of getting a rare disease called rickets. It causes weak bones. Vitamin D and calcium are added to many foods. And your body uses sunshine to make its own vitamin D. How much vitamin D do you need? The recommended dietary allowance (RDA) for vitamin D is 600 IU (international units) every day for people ages 3 through 79. Adults 71 and older need 800 IU every day. How can you get more vitamin D? Foods that contain vitamin D include:  · [place], tuna, and mackerel. These are some of the best foods to eat when you need to get more vitamin D.  · Cheese, egg yolks, and beef liver. These foods have vitamin D in small amounts. · Milk, soy drinks, orange juice, yogurt, margarine, and some kinds of cereal have vitamin D added to them. Some people don't make vitamin D as well as others. They may have to take extra care in getting enough vitamin D. Things that reduce how much vitamin D your body makes include:  · Having dark skin. · Age, especially if you are older than 72. · Digestive problems, such as Crohn's or celiac disease. · Liver and kidney disease. Some people who do not get enough vitamin D may need supplements. Are there any risks from taking vitamin D?  · Too much vitamin D:  ? Can damage your kidneys. ? Can cause nausea and vomiting, constipation, and weakness. ? Raises the amount of calcium in your blood.  If this happens, you can get confused or have an irregular heart rhythm. · Vitamin D may interact with other medicines. Tell your doctor about all of the medicines you take, including over-the-counter drugs, herbs, and pills. Tell your doctor about all of your current medical problems. Where can you learn more? Go to http://www.meyer.com/  Enter M330 in the search box to learn more about \"Learning About Vitamin D.\"  Current as of: September 8, 2021               Content Version: 13.2  © 2006-2022 [employer]. Care instructions adapted under license by [employer] (which disclaims liability or warranty for this information). If you have questions about a medical condition or this instruction, always ask your healthcare professional. [address] any warranty or liability for your use of this information.

## 2022-05-03 ENCOUNTER — HOSPITAL ENCOUNTER (OUTPATIENT)
Dept: PHYSICAL THERAPY | Age: 62
Discharge: HOME | End: 2022-05-03
Attending: ORTHOPAEDIC SURGERY
Payer: COMMERCIAL

## 2022-05-03 PROCEDURE — 97110 THERAPEUTIC EXERCISES: CPT

## 2022-05-03 PROCEDURE — 97530 THERAPEUTIC ACTIVITIES: CPT

## 2022-05-03 PROCEDURE — 97035 APP MDLTY 1+ULTRASOUND EA 15: CPT

## 2022-05-03 PROCEDURE — 97140 MANUAL THERAPY 1/> REGIONS: CPT

## 2022-05-03 NOTE — PROGRESS NOTES
PT DAILY TREATMENT NOTE     Patient Name: Estelita Villagomez. IVIR:  : 1960  [x]  Patient  Verified  Payor: BLUE DEVORA / Plan: Karan Hameed [id number] PPO / Product Type: PPO /    In time:930  Out time:1031  Total Treatment Time (min): 61  Visit #: 4 of 10    Medicare/BCBS Only   Total Timed Codes (min):  56 1:1 Treatment Time:  56       Treatment Area: Left shoulder pain [M25.512]    SUBJECTIVE  Pain Level (0-10 scale): 3/10  Any medication changes, allergies to medications, adverse drug reactions, diagnosis change, or new procedure performed?: [x] No    [] Yes (see summary sheet for update)  Subjective functional status/changes:   [] No changes reported  Continues to report 3/10     OBJECTIVE      Modality rationale: decrease inflammation, decrease pain and increase tissue extensibility to improve the patients ability to perform ADLs   Min Type Additional Details      []? Estim:  []? Unatt       []? IFC  []? Premod                        []?Other:  []?w/ice   []?w/heat  Position:  Location:      []? Estim: []? Att    []? TENS instruct  []? NMES                    []?Other:  []?w/US   []?w/ice   []?w/heat  Position:  Location:      []? Traction: []? Cervical       []? Lumbar                       []? Prone          []? Supine                       []?Intermittent   []? Continuous Lbs:  []? before manual  []? after manual    8 [x]? Ultrasound: [x]? Continuous   []? Pulsed                           []? 1MHz   [x]? 3MHz W/cm2: 1.3  Location: left anterior shoulder, left biceps tendon       []? Iontophoresis with dexamethasone         Location: []? Take home patch   []? In clinic    5 []? Ice     [x]? heat  []? Ice massage  []? Laser   []? Anodyne Position: Long sitting  Location: left shoulder       []? Laser with stim  []? Other:  Position:  Location:      []? Vasopneumatic Device    []? Right     []?   Left  Pre-treatment girth:  Post-treatment girth:  Measured at (location):  Pressure: []? lo []? med []? hi   Temperature: []? lo []? med []? hi    [x]? Skin assessment post-treatment:  [x]? intact []? redness- no adverse reaction    []? redness - adverse reaction:       28 min Therapeutic Exercise:  [] See flow sheet :   Rationale: increase ROM and increase strength to improve the patients ability to perform ADLs    10 min Therapeutic Activity:  []  See flow sheet :   Rationale: increase ROM, increase strength and improve coordination  to improve the patients ability to perform ADLs      10 min Manual Therapy:  STM/DTM/TPR left biceps tendon/anterior shoulder, scar massage, PROM with overstretch, GH joint mobs    The manual therapy interventions were performed at a separate and distinct time from the therapeutic activities interventions. Rationale: decrease pain, increase ROM, increase tissue extensibility and decrease trigger points to perform ADLs        With   [x] TE   [] TA   [] neuro   [] other: Patient Education: [x] Review HEP    [] Progressed/Changed HEP based on:   [] positioning   [] body mechanics   [] transfers   [] heat/ice application    [] other:      Other Objective/Functional Measures:   - Progressed TE per flow sheet        Pain Level (0-10 scale) post treatment: 2/10    ASSESSMENT/Changes in Function: Patient actively participates in therapy and tolerates progressions well with no c/o increased pain       Patient will continue to benefit from skilled PT services to modify and progress therapeutic interventions, address functional mobility deficits, address ROM deficits, address strength deficits, analyze and address soft tissue restrictions and analyze and cue movement patterns to attain remaining goals.      []  See Plan of Care  []  See progress note/recertification  []  See Discharge Summary         Progress towards goals / Updated goals:  Updated Goals: to be achieved in 10 treatments:  1.  Pt to tolerate 30 min or more of TE and/or Interventions w/o increased s/s                        GEDCAA at last note/certification: Pt tolerated 46 min of treatment with increased s/s 4/7/22                                    Current Status:                 2.  Pt will report 50% improvement or better with Left shoulder dysfunction to show a significant increase in ability to tolerate ADL             Status at last note/certification: Not assess at this point             Current Status:               3.  Pt will demonstrate PROM to 120* Flx/ABD for progress to AAROM/AROM to 120* with good tolerance and little pain              Status at last note/certification:  Flx 97 ABD 83 3/31/22             Current Status:  4.  Pt will demonstrate AROM left shoulder Flx/ABD to 90* for progress to performing light activity at shoulder level with little difficulty              WZBPKP at last note/certification: Improved at [facility] 75 4/7/22             Current Status:   5.  Pt will have decreased pain at 2/10 with performing activity with the left shoulder for carryover to increased use of the left UE with daily actvity              Status at last note/certification:  Progressing, reports 3/10 pain throughout TE today.               Current Status: Continues to report 3/10 pain at the start and end of tx (4/28/22)   6.  Pt will improve FOTO score to 65 in 16 visits to show significant improvement for progress to normal function             Status at last note/certification: FOTO = 45             Current Status:       PLAN  [x]  Upgrade activities as tolerated     [x]  Continue plan of care  []  Update interventions per flow sheet       []  Discharge due to:_  []  Other:_      Teo Kilgore PTA 5/3/2022  9:42 AM    Future Appointments   Date Time Provider Billie Turcios   5/5/2022  9:30 AM Yesi Kaur PT [facility]   5/10/2022  9:30 AM Skye Rausch PTA [facility]   5/12/2022  9:30 AM Skye Rausch PTA [facility]   5/17/2022  9:30 AM Skye Rausch PTA [facility] 5/19/2022  9:30 AM Lizzie Florentino PTA [facility]   5/24/2022  9:30 AM Niles Hansen PT [facility]   5/26/2022  9:30 AM Lizzie Florentino PTA [facility]   6/1/2022  8:15 AM JOSE Can [facility] BS AMB   8/1/2022  8:00 AM Mary Vick MD [facility] BS AMB

## 2022-05-05 ENCOUNTER — HOSPITAL ENCOUNTER (OUTPATIENT)
Dept: PHYSICAL THERAPY | Age: 62
Discharge: HOME | End: 2022-05-05
Attending: ORTHOPAEDIC SURGERY
Payer: COMMERCIAL

## 2022-05-05 PROCEDURE — 97140 MANUAL THERAPY 1/> REGIONS: CPT

## 2022-05-05 PROCEDURE — 97032 APPL MODALITY 1+ESTIM EA 15: CPT

## 2022-05-05 PROCEDURE — 97110 THERAPEUTIC EXERCISES: CPT

## 2022-05-05 NOTE — PROGRESS NOTES
PT DAILY TREATMENT NOTE     Patient Name: Sakshi Cook.   Date:2022  : 1960  [x]  Patient  Verified  Payor: BLUE CROSS / Plan: [facility] PPO / Product Type: PPO /    In time:930  Out time:  Total Treatment Time (min): 68  Visit #: 5 of 10    Medicare/BCBS Only   Total Timed Codes (min):  73 1:1 Treatment Time:  73       Treatment Area: Left shoulder pain [M25.512]    SUBJECTIVE  Pain Level (0-10 scale): 3  Any medication changes, allergies to medications, adverse drug reactions, diagnosis change, or new procedure performed?: [x] No    [] Yes (see summary sheet for update)  Subjective functional status/changes:   [] No changes reported  Not a lot of pain, just hard to move    OBJECTIVE    Modality rationale: decrease inflammation, decrease pain and increase tissue extensibility to improve the patients ability to tolerate increased  activity   Min Type Additional Details    [] Estim:  []Unatt       []IFC  []Premod                        []Other:  []w/ice   []w/heat  Position:  Location:   16 [x] Estim: []Att    []TENS instruct  [x]NMES                    [x]Other: Combo [x]w/US   []w/ice   []w/heat  Position: Right SL  Location: Left shoulder region    []  Traction: [] Cervical       []Lumbar                       [] Prone          []Supine                       []Intermittent   []Continuous Lbs:  [] before manual  [] after manual    []  Ultrasound: []Continuous   [] Pulsed                           []1MHz   []3MHz W/cm2:  Location:    []  Iontophoresis with dexamethasone         Location: [] Take home patch   [] In clinic    []  Ice     []  heat  []  Ice massage  []  Laser   []  Anodyne Position:  Location:    []  Laser with stim  []  Other:  Position:  Location:    []  Vasopneumatic Device    []  Right     []  Left  Pre-treatment girth:  Post-treatment girth:  Measured at (location):  Pressure:       [] lo [] med [] hi   Temperature: [] lo [] med [] hi   [x] Skin assessment post-treatment:  [x]intact []redness- no adverse reaction    []redness - adverse reaction:     10 min Therapeutic Exercise:  [x] See flow sheet :   Rationale: increase ROM, increase strength and improve coordination to improve the patients ability to perform increased activity    47 min Manual Therapy:  STM/DTM/TPR Left Anterior/Lateral Deltoid, Scar massage, KT I-Strips for spacing at the anterior deltoid and lateral surgical scar full strength, LAD, GH Jt mobs for elevation, Left GH pa mobs/AC Mobs, PROM with overstretch in shoulder Flx/ABD/Scap    The manual therapy interventions were performed at a separate and distinct time from the therapeutic activities interventions. Rationale: decrease pain, increase ROM, increase tissue extensibility and decrease trigger points to improve daily activity          With   [] TE   [] TA   [] neuro   [] other: Patient Education: [x] Review HEP    [] Progressed/Changed HEP based on:   [] positioning   [] body mechanics   [] transfers   [] heat/ice application    [] other:      Other Objective/Functional Measures:  -  TTP with muscle tightness at the left anterior deltoid  - Scar adhesion at the lateral surgical scar     AROM PROM Strength Pain? ?    Right Left Right Left Right Left    Shoulder Flx  69  125      Shoulder Ext  22        Shoulder ABD  72  152      Shoulder ADD          Shoulder IR          Shoulder ER  22 @ N        Stand:Flx 75 ABD 77 After PROM  Supine: Flx 80 ABD 86 After PROM    Pain Level (0-10 scale) post treatment: 3    ASSESSMENT/Changes in Function:   - Pt continues to show good effort with this treatment program  - AROM after treatment Flx 85 ABD 80    Patient will continue to benefit from skilled PT services to modify and progress therapeutic interventions, address functional mobility deficits, address ROM deficits, address strength deficits, analyze and address soft tissue restrictions and analyze and cue movement patterns to attain remaining goals.      []  See Plan of Care  []  See progress note/recertification  []  See Discharge Summary         Progress towards goals / Updated goals:  Updated Goals: to be achieved in 10 treatments:  1.  Pt to tolerate 30 min or more of TE and/or Interventions w/o increased s/s                        Status at last note/certification: Pt tolerated 46 min of treatment with increased s/s 4/7/22                                    Current Status:                 2.  Pt will report 50% improvement or better with Left shoulder dysfunction to show a significant increase in ability to tolerate ADL             Status at last note/certification: Not assess at this point             Current Status:               3.  Pt will demonstrate PROM to 120* Flx/ABD for progress to AAROM/AROM to 120* with good tolerance and little pain              Status at last note/certification:  Flx 97 ABD 83 3/31/22             Current Status: Progressing at Flx 125  5/5/22  4.  Pt will demonstrate AROM left shoulder Flx/ABD to 90* for progress to performing light activity at shoulder level with little difficulty              [facility] at last note/certification: Improved at [facility] 75 4/7/22             Current Status:  AROM Left Shoulder Flx 75 ABD 77 at start of treatment 5/5/22  5.  Pt will have decreased pain at 2/10 with performing activity with the left shoulder for carryover to increased use of the left UE with daily actvity              Status at last note/certification:  Progressing, reports 3/10 pain throughout TE today.               Current Status: Continues to report 3/10 pain at the start and end of tx (4/28/22)   6.  Pt will improve FOTO score to 65 in 16 visits to show significant improvement for progress to normal function             Status at last note/certification: FOTO = 45             Current Status:       PLAN  [x]  Upgrade activities as tolerated     [x]  Continue plan of care  []  Update interventions per flow sheet       []  Discharge due to:_  []  Other:_      Gordon Issa, PT 5/5/2022  7:47 AM    Future Appointments   Date Time Provider Billie Turcios   5/5/2022  9:30 AM Maximus Ribera, PT [facility]   5/10/2022  9:30 AM [place], PTA [facility]   5/12/2022  9:30 AM [place], PTA [facility]   5/17/2022  9:30 AM [place], PTA [facility]   5/19/2022  9:30 AM [place], PTA [facility]   5/24/2022  9:30 AM Maximus Ribera, PT [facility]   5/26/2022  9:30 AM [place], PTA [facility]   6/1/2022  8:15 AM JOSE Duff [facility] BS AMB   8/1/2022  8:00 AM Cem Mcclellan MD [facility] BS AMB

## 2022-05-10 ENCOUNTER — HOSPITAL ENCOUNTER (OUTPATIENT)
Dept: PHYSICAL THERAPY | Age: 62
Discharge: HOME | End: 2022-05-10
Attending: ORTHOPAEDIC SURGERY
Payer: COMMERCIAL

## 2022-05-10 PROCEDURE — 97140 MANUAL THERAPY 1/> REGIONS: CPT

## 2022-05-10 PROCEDURE — 97110 THERAPEUTIC EXERCISES: CPT

## 2022-05-10 PROCEDURE — 97530 THERAPEUTIC ACTIVITIES: CPT

## 2022-05-10 PROCEDURE — 97032 APPL MODALITY 1+ESTIM EA 15: CPT

## 2022-05-10 NOTE — PROGRESS NOTES
PT DAILY TREATMENT NOTE     Patient Name: Abdoulaye Sapp. Date:5/10/2022  : 1960  [x]  Patient  Verified  Payor: BLUE CROSS / Plan: Karan Hameed [id number] PPO / Product Type: PPO /    In time:932  Out time:1040  Total Treatment Time (min): 76  Visit #: 6 of 10    Medicare/BCBS Only   Total Timed Codes (min):  68 1:1 Treatment Time:  68       Treatment Area: Left shoulder pain [M25.512]    SUBJECTIVE  Pain Level (0-10 scale): 3/10  Any medication changes, allergies to medications, adverse drug reactions, diagnosis change, or new procedure performed?: [x] No    [] Yes (see summary sheet for update)  Subjective functional status/changes:   [] No changes reported  Continues to report 3/10 pain     OBJECTIVE      Modality rationale: decrease inflammation, decrease pain and increase tissue extensibility to improve the patients ability to tolerate increased  activity   Min Type Additional Details      []? Estim:  []? Unatt       []? IFC  []? Premod                        []?Other:  []?w/ice   []?w/heat  Position:  Location:    10 [x]? Estim: []? Att    []? TENS instruct  [x]? NMES                    [x]? Other: Combo [x]?w/US   []?w/ice   []?w/heat  Position: Right SL  Location: Left shoulder region      []? Traction: []? Cervical       []? Lumbar                       []? Prone          []? Supine                       []?Intermittent   []? Continuous Lbs:  []? before manual  []? after manual      []? Ultrasound: []? Continuous   []? Pulsed                           []? 1MHz   []? 3MHz W/cm2:  Location:      []? Iontophoresis with dexamethasone         Location: []? Take home patch   []? In clinic      []? Ice     []?  heat  []? Ice massage  []? Laser   []? Anodyne Position:  Location:      []? Laser with stim  []? Other:  Position:  Location:      []? Vasopneumatic Device    []? Right     []?   Left  Pre-treatment girth:  Post-treatment girth:  Measured at (location):  Pressure:       []? lo []? med []? hi   Temperature: []? lo []? med []? hi    [x]? Skin assessment post-treatment:  [x]? intact []? redness- no adverse reaction    []? redness - adverse reaction:       31 min Therapeutic Exercise:   See flow sheet :   Rationale: increase ROM and increase strength to improve the patients ability to perform ADLs    12 min Therapeutic Activity:  [x]  See flow sheet :   Rationale: increase ROM, increase strength and improve coordination  to improve the patients ability to perform ADLs    15 min Manual Therapy:  Scar massage, STM/DTM TPR Left anterior/lateral deltoid, LAD, GH jt mobs, Left GH pa mobs/AC mobs, PROm with overstretch flx/abd/scap, KT I strips for spacing at the anterior deltoid and lateral surgical scar    The manual therapy interventions were performed at a separate and distinct time from the therapeutic activities interventions. Rationale: decrease pain, increase ROM, increase tissue extensibility and decrease trigger points to perform ADLs            With   [x] TE   [] TA   [] neuro   [] other: Patient Education: [x] Review HEP    [] Progressed/Changed HEP based on:   [] positioning   [] body mechanics   [] transfers   [] heat/ice application    [] other:      Other Objective/Functional Measures:   - Progressed TE per flow sheet   - TTP with muscle tightness at the left anterior deltoid  - Improved scar adhesion at the lateral surgical scar     Pain Level (0-10 scale) post treatment: 3/10    ASSESSMENT/Changes in Function: patient tolerated treatment well with minor c/o increased discomfort. Patient will continue to benefit from skilled PT services to modify and progress therapeutic interventions, address functional mobility deficits, address ROM deficits, address strength deficits and analyze and address soft tissue restrictions to attain remaining goals.      []  See Plan of Care  []  See progress note/recertification  []  See Discharge Summary         Progress towards goals / Updated goals:  Updated Goals: to be achieved in 10 treatments:  1.  Pt to tolerate 30 min or more of TE and/or Interventions w/o increased s/s                        Status at last note/certification: Pt tolerated 46 min of treatment with increased s/s 4/7/22                                    Current Status:                 2.  Pt will report 50% improvement or better with Left shoulder dysfunction to show a significant increase in ability to tolerate ADL             Status at last note/certification: Not assess at this point             Current Status:               3.  Pt will demonstrate PROM to 120* Flx/ABD for progress to AAROM/AROM to 120* with good tolerance and little pain              Status at last note/certification:  Flx 97 ABD 83 3/31/22             Current Status: Progressing at Flx 125  5/5/22  4.  Pt will demonstrate AROM left shoulder Flx/ABD to 90* for progress to performing light activity at shoulder level with little difficulty              UQNDMQ at last note/certification: Improved at [facility] 75 4/7/22             Current Status:  AROM Left Shoulder Flx 75 ABD 77 at start of treatment 5/5/22  5.  Pt will have decreased pain at 2/10 with performing activity with the left shoulder for carryover to increased use of the left UE with daily actvity              Status at last note/certification:  Progressing, reports 3/10 pain throughout TE today.               Current Status: Continues to report 3/10 pain at the start and end of tx (4/28/22)   6.  Pt will improve FOTO score to 65 in 16 visits to show significant improvement for progress to normal function             Status at last note/certification: FOTO = 45             Current Status:       PLAN  [x]  Upgrade activities as tolerated     [x]  Continue plan of care  []  Update interventions per flow sheet       []  Discharge due to:_  []  Other:_      Rosales Null, DAYSI 5/10/2022  9:39 AM    Future Appointments   Date Time Provider Department [place]   5/12/2022  9:30 AM Marcos Juarez, DAYSI [facility]   5/17/2022  9:30 AM Marcos Juarez, PTA [facility]   5/19/2022  9:30 AM Marcos Juarez, PTA [facility]   5/24/2022  9:30 AM Mika Barnes, PT [facility]   5/26/2022  9:30 AM Marcos Juarez, DAYSI [facility]   6/1/2022  8:15 AM JOSE Morillo [facility] BS AMB   8/1/2022  8:00 AM Lenard Gunderson MD [facility] BS AMB

## 2022-05-12 ENCOUNTER — APPOINTMENT (OUTPATIENT)
Dept: PHYSICAL THERAPY | Age: 62
End: 2022-05-12
Attending: ORTHOPAEDIC SURGERY
Payer: COMMERCIAL

## 2022-05-17 ENCOUNTER — APPOINTMENT (OUTPATIENT)
Dept: PHYSICAL THERAPY | Age: 62
End: 2022-05-17
Attending: ORTHOPAEDIC SURGERY
Payer: COMMERCIAL

## 2022-05-19 ENCOUNTER — HOSPITAL ENCOUNTER (OUTPATIENT)
Dept: PHYSICAL THERAPY | Age: 62
Discharge: HOME | End: 2022-05-19
Attending: ORTHOPAEDIC SURGERY
Payer: COMMERCIAL

## 2022-05-19 PROCEDURE — 97530 THERAPEUTIC ACTIVITIES: CPT

## 2022-05-19 PROCEDURE — 97110 THERAPEUTIC EXERCISES: CPT

## 2022-05-19 PROCEDURE — 97140 MANUAL THERAPY 1/> REGIONS: CPT

## 2022-05-19 PROCEDURE — 97035 APP MDLTY 1+ULTRASOUND EA 15: CPT

## 2022-05-19 NOTE — PROGRESS NOTES
PT DAILY TREATMENT NOTE     Patient Name: Candelario Farrell.   Date:2022  : 1960  [x]  Patient  Verified  Payor: BLUE CROSS / Plan: Karan Hameed [id number] PPO / Product Type: PPO /    In time:931  Out time:1029  Total Treatment Time (min): 62  Visit #: 7 of 10    Medicare/BCBS Only   Total Timed Codes (min):  58 1:1 Treatment Time:  62       Treatment Area: Left shoulder pain [M25.512]    SUBJECTIVE  Pain Level (0-10 scale): 3/10  Any medication changes, allergies to medications, adverse drug reactions, diagnosis change, or new procedure performed?: [x] No    [] Yes (see summary sheet for update)  Subjective functional status/changes:   [] No changes reported  Continues to report 3/10 pain   OBJECTIVE    Modality rationale: decrease inflammation, decrease pain and increase tissue extensibility to improve the patients ability to perform ADLs   Min Type Additional Details    [] Estim:  []Unatt       []IFC  []Premod                        []Other:  []w/ice   []w/heat  Position:  Location:    [] Estim: []Att    []TENS instruct  []NMES                    []Other:  []w/US   []w/ice   []w/heat  Position:  Location:    []  Traction: [] Cervical       []Lumbar                       [] Prone          []Supine                       []Intermittent   []Continuous Lbs:  [] before manual  [] after manual   8 [x]  Ultrasound: [x]Continuous   [] Pulsed                           []1MHz   [x]3MHz W/cm2: 1.3  Location: left anterior shoulder, left biceps tendon     []  Iontophoresis with dexamethasone         Location: [] Take home patch   [] In clinic    []  Ice     []  heat  []  Ice massage  []  Laser   []  Anodyne Position:  Location:    []  Laser with stim  []  Other:  Position:  Location:    []  Vasopneumatic Device    []  Right     []  Left  Pre-treatment girth:  Post-treatment girth:  Measured at (location):  Pressure:       [] lo [] med [] hi   Temperature: [] lo [] med [] hi   [x] Skin assessment post-treatment:  [x]intact [x]redness- no adverse reaction    []redness - adverse reaction:     25 min Therapeutic Exercise:  [] See flow sheet :   Rationale: increase ROM and increase strength to improve the patients ability to perform ADLs    10 min Therapeutic Activity:  []  See flow sheet :   Rationale: increase ROM, increase strength and improve coordination  to improve the patients ability to perform ADLs       15 min Manual Therapy:  Scar massage, STM/DTM/TPR left anterior/lateral deltoid, LAD, GH jt mobs, Left GH pa mobs/ AC mobs, PROM with overstretch flexion/abd/scap   The manual therapy interventions were performed at a separate and distinct time from the therapeutic activities interventions. Rationale: decrease pain, increase ROM, increase tissue extensibility and decrease trigger points to perform ADLs             With   [x] TE   [] TA   [] neuro   [] other: Patient Education: [x] Review HEP    [] Progressed/Changed HEP based on:   [] positioning   [] body mechanics   [] transfers   [] heat/ice application    [] other:      Other Objective/Functional Measures:   - increased pulleys to 2' each direction   - PROM shoulder flexion 131 deg     Pain Level (0-10 scale) post treatment: 2/10    ASSESSMENT/Changes in Function: Patient actively participates in therapy and tolerates progressions well with no c/o increased pain       Patient will continue to benefit from skilled PT services to modify and progress therapeutic interventions, address functional mobility deficits, address ROM deficits, address strength deficits, analyze and address soft tissue restrictions and analyze and cue movement patterns to attain remaining goals.      []  See Plan of Care  []  See progress note/recertification  []  See Discharge Summary         Progress towards goals / Updated goals:  Updated Goals: to be achieved in 10 treatments:  1.  Pt to tolerate 30 min or more of TE and/or Interventions w/o increased s/s                        IYUQUR at last note/certification: Pt tolerated 46 min of treatment with increased s/s 4/7/22                                    Current Status:                 2.  Pt will report 50% improvement or better with Left shoulder dysfunction to show a significant increase in ability to tolerate ADL             Status at last note/certification: Not assess at this point             Current Status:               3.  Pt will demonstrate PROM to 120* Flx/ABD for progress to AAROM/AROM to 120* with good tolerance and little pain              Status at last note/certification:  Flx 97 ABD 83 3/31/22             Current Status: Progressing at Flx 131  5/5/22  4.  Pt will demonstrate AROM left shoulder Flx/ABD to 90* for progress to performing light activity at shoulder level with little difficulty              TXBICQ at last note/certification: Improved at [facility] 75 4/7/22             Current Status:  AROM Left Shoulder Flx 75 ABD 77 at start of treatment 5/5/22  5.  Pt will have decreased pain at 2/10 with performing activity with the left shoulder for carryover to increased use of the left UE with daily actvity              Status at last note/certification:  Progressing, reports 3/10 pain throughout TE today.               Current Status: Continues to report 3/10 pain at the start and end of tx (4/28/22)   6.  Pt will improve FOTO score to 65 in 16 visits to show significant improvement for progress to normal function             Status at last note/certification: FOTO = 45             Current Status:       PLAN  [x]  Upgrade activities as tolerated     [x]  Continue plan of care  []  Update interventions per flow sheet       []  Discharge due to:_  []  Other:_      Pam Chandra PTA 5/19/2022  10:57 AM    Future Appointments   Date Time Provider Billie Turcios   5/24/2022  9:30 AM Darin Marin, PT [facility]   5/26/2022  9:30 AM Quan Potter PTA [facility]   6/1/2022 8:15 AM JOSE Sotelo [facility] BS AMB   8/1/2022  8:00 AM Matt Adams MD [facility] BS AMB

## 2022-05-23 ENCOUNTER — DOCUMENTATION ONLY (OUTPATIENT)
Dept: ORTHOPEDIC SURGERY | Age: 62
End: 2022-05-23

## 2022-05-24 ENCOUNTER — HOSPITAL ENCOUNTER (OUTPATIENT)
Dept: PHYSICAL THERAPY | Age: 62
Discharge: HOME | End: 2022-05-24
Attending: ORTHOPAEDIC SURGERY
Payer: COMMERCIAL

## 2022-05-24 PROCEDURE — 97140 MANUAL THERAPY 1/> REGIONS: CPT

## 2022-05-24 PROCEDURE — 97110 THERAPEUTIC EXERCISES: CPT

## 2022-05-24 NOTE — [facility] IDG VOLUNTEER NOTES
PT DAILY TREATMENT NOTE     Patient Name: Abdiel Parra.   Date:2022  : 1960  [x]  Patient  Verified  Payor: BLUE CROSS / Plan: Karan Hameed [id number] PPO / Product Type: PPO /    In LMBB:0022  Out time:1030  Total Treatment Time (min): 55  Visit #: 8 of 10    Medicare/BCBS Only   Total Timed Codes (min):  55 1:1 Treatment Time:  55       Treatment Area: Left shoulder pain [M25.512]    SUBJECTIVE  Pain Level (0-10 scale): 3  Any medication changes, allergies to medications, adverse drug reactions, diagnosis change, or new procedure performed?: [x] No    [] Yes (see summary sheet for update)  Subjective functional status/changes:   [] No changes reported  Feeling better after having surgery to remove kidney stone    OBJECTIVE    Modality rationale: decrease inflammation, decrease pain and increase tissue extensibility to improve the patients ability to improve daily activity   Min Type Additional Details    [] Estim:  []Unatt       []IFC  []Premod                        []Other:  []w/ice   []w/heat  Position:  Location:    [] Estim: []Att    []TENS instruct  []NMES                    []Other:  []w/US   []w/ice   []w/heat  Position:  Location:    []  Traction: [] Cervical       []Lumbar                       [] Prone          []Supine                       []Intermittent   []Continuous Lbs:  [] before manual  [] after manual   8 [x]  Ultrasound: [x]Continuous   [] Pulsed                           [x]1MHz   []3MHz W/cm2:1.7  Location: Left pec and anterior deltoid    []  Iontophoresis with dexamethasone         Location: [] Take home patch   [] In clinic    []  Ice     []  heat  []  Ice massage  []  Laser   []  Anodyne Position:  Location:    []  Laser with stim  []  Other:  Position:  Location:    []  Vasopneumatic Device    []  Right     []  Left  Pre-treatment girth:  Post-treatment girth:  Measured at (location):  Pressure:       [] lo [] med [] hi   Temperature: [] lo [] med [] hi   [x] Skin assessment post-treatment:  [x]intact []redness- no adverse reaction    []redness - adverse reaction:     37 min Therapeutic Exercise:  [x] See flow sheet :   Rationale: increase ROM, increase strength and improve coordination to improve the patients ability to improve activity tolerance    10 min Manual Therapy:  STM/DTM left pec/anterior deltoid   The manual therapy interventions were performed at a separate and distinct time from the therapeutic activities interventions. Rationale: decrease pain, increase ROM, increase tissue extensibility and decrease trigger points to tolerate better mobility of the right shoulder          With   [x] TE   [] TA   [] neuro   [] other: Patient Education: [x] Review HEP    [] Progressed/Changed HEP based on:   [] positioning   [] body mechanics   [] transfers   [] heat/ice application    [] other:      Other Objective/Functional Measures:   - AAROM with finger ladder at 116* Flx  - AROM Left shoulder Flx 81*  - TTP with muscle tightness at the left pec and anterior deltoid     Pain Level (0-10 scale) post treatment: 3    ASSESSMENT/Changes in Function:   - AROM left shoulder Flx at 92 ABD 70    Patient will continue to benefit from skilled PT services to modify and progress therapeutic interventions, address functional mobility deficits, address ROM deficits, address strength deficits, analyze and address soft tissue restrictions and analyze and cue movement patterns to attain remaining goals.      []  See Plan of Care  []  See progress note/recertification  []  See Discharge Summary           Progress towards goals / Updated goals:  Updated Goals: to be achieved in 10 treatments:  1.  Pt to tolerate 30 min or more of TE and/or Interventions w/o increased s/s                        Status at last note/certification: Pt tolerated 46 min of treatment with increased s/s 4/7/22                                    Current Status:                 2.  Pt will report 50% improvement or better with Left shoulder dysfunction to show a significant increase in ability to tolerate ADL             Status at last note/certification: Not assess at this point             Current Status:               3.  Pt will demonstrate PROM to 120* Flx/ABD for progress to AAROM/AROM to 120* with good tolerance and little pain              Status at last note/certification:  Flx 97 ABD 83 3/31/22             Current Status: Progressing at Flx 131  5/5/22  4.  Pt will demonstrate AROM left shoulder Flx/ABD to 90* for progress to performing light activity at shoulder level with little difficulty              NPEUZT at last note/certification: Improved at [facility] 75 4/7/22             Current Status:  AROM Left Shoulder Flx 75 ABD 77 at start of treatment 5/5/22  5.  Pt will have decreased pain at 2/10 with performing activity with the left shoulder for carryover to increased use of the left UE with daily actvity              Status at last note/certification:  Progressing, reports 3/10 pain throughout TE today.               Current Status: Continues to report 3/10 pain at the start and end of tx (4/28/22)   6.  Pt will improve FOTO score to 65 in 16 visits to show significant improvement for progress to normal function             Status at last note/certification: FOTO = 45             Current Status:       PLAN  [x]  Upgrade activities as tolerated     [x]  Continue plan of care  []  Update interventions per flow sheet       []  Discharge due to:_  []  Other:_      Watson Fry, PT 5/24/2022  8:44 AM    Future Appointments   Date Time Provider Billie Turcios   5/24/2022  9:30 AM Carlo Webster, PT [facility]   5/26/2022  9:30 AM Soledad Maya PTA [facility]   6/1/2022  8:15 AM JOSE Gagnon [facility] BS AMB   8/1/2022  8:00 AM Samir Lobato MD [facility] BS AMB

## 2022-05-26 ENCOUNTER — HOSPITAL ENCOUNTER (OUTPATIENT)
Dept: PHYSICAL THERAPY | Age: 62
Discharge: HOME | End: 2022-05-26
Attending: ORTHOPAEDIC SURGERY
Payer: COMMERCIAL

## 2022-05-26 PROCEDURE — 97110 THERAPEUTIC EXERCISES: CPT

## 2022-05-26 PROCEDURE — 97035 APP MDLTY 1+ULTRASOUND EA 15: CPT

## 2022-05-26 PROCEDURE — 97140 MANUAL THERAPY 1/> REGIONS: CPT

## 2022-05-26 NOTE — PROGRESS NOTES
PT DAILY TREATMENT NOTE     Patient Name: Solomon Koroma.   Date:2022  : 1960  [x]  Patient  Verified  Payor: BLUE CROSS / Plan: Karan Hameed [id number] PPO / Product Type: PPO /    In time:928  Out time:1020  Total Treatment Time (min): 46  Visit #: 1 of 10    Medicare/BCBS Only   Total Timed Codes (min):  52 1:1 Treatment Time:  52       Treatment Area: Left shoulder pain [M25.512]    SUBJECTIVE  Pain Level (0-10 scale): 5  Any medication changes, allergies to medications, adverse drug reactions, diagnosis change, or new procedure performed?: [x] No    [] Yes (see summary sheet for update)  Subjective functional status/changes:   [] No changes reported  Increased pain and stiffness today for reason unknown     OBJECTIVE    Modality rationale: decrease inflammation, decrease pain and increase tissue extensibility to improve the patients ability to perform ADLs   Min Type Additional Details    [] Estim:  []Unatt       []IFC  []Premod                        []Other:  []w/ice   []w/heat  Position:  Location:    [] Estim: []Att    []TENS instruct  []NMES                    []Other:  []w/US   []w/ice   []w/heat  Position:  Location:    []  Traction: [] Cervical       []Lumbar                       [] Prone          []Supine                       []Intermittent   []Continuous Lbs:  [] before manual  [] after manual   8 [x]  Ultrasound: [x]Continuous   [] Pulsed                           [x]1MHz   []3MHz W/cm2: 1.7  Location: left pec/anterior delt    []  Iontophoresis with dexamethasone         Location: [] Take home patch   [] In clinic    []  Ice     []  heat  []  Ice massage  []  Laser   []  Anodyne Position:  Location:    []  Laser with stim  []  Other:  Position:  Location:    []  Vasopneumatic Device    []  Right     []  Left  Pre-treatment girth:  Post-treatment girth:  Measured at (location):  Pressure:       [] lo [] med [] hi   Temperature: [] lo [] med [] hi   [x] Skin assessment post-treatment:  [x]intact [x]redness- no adverse reaction    []redness - adverse reaction:       32 min Therapeutic Exercise:  [x] See flow sheet :   Rationale: increase ROM and increase strength to improve the patients ability to perform ADLs      12 min Manual Therapy:  STM/DTM left pec/anterior deltoid, GH joint mobs    The manual therapy interventions were performed at a separate and distinct time from the therapeutic activities interventions. Rationale: decrease pain, increase ROM, increase tissue extensibility and decrease trigger points to perform ADLs            With   [x] TE   [] TA   [] neuro   [] other: Patient Education: [x] Review HEP    [] Progressed/Changed HEP based on:   [] positioning   [] body mechanics   [] transfers   [] heat/ice application    [] other:      Other Objective/Functional Measures:   - FOTO = 51     Pain Level (0-10 scale) post treatment: 3/10    ASSESSMENT/Changes in Function: Patient actively participates in therapy and tolerates progressions well with no c/o increased pain       Patient will continue to benefit from skilled PT services to modify and progress therapeutic interventions, address functional mobility deficits, address ROM deficits, address strength deficits, analyze and address soft tissue restrictions and analyze and cue movement patterns to attain remaining goals.      []  See Plan of Care  []  See progress note/recertification  []  See Discharge Summary         Progress towards goals / Updated goals:  Updated Goals: to be achieved in 10 treatments:  1.  Pt to tolerate 30 min or more of TE and/or Interventions w/o increased s/s                        Status at last note/certification: Pt tolerated 46 min of treatment with increased s/s                             ZLIBQOB Status:  Progressing, continues to tolerate treatment well with minor increase in pain            2.  Pt will report 50% improvement or better with Left shoulder dysfunction to show a significant increase in ability to tolerate ADL             Status at last note/certification: Not assess at this point             Current Status: met, reports 50% improvement               3.  Pt will demonstrate PROM to 120* Flx/ABD for progress to AAROM/AROM to 120* with good tolerance and little pain              Status at last note/certification:  Flx 97 ABD 83              Current Status: Progressing PROM at Flx 131 , AAROM flexion 116*    4.  Pt will demonstrate AROM left shoulder Flx/ABD to 90* for progress to performing light activity at shoulder level with little difficulty              OOJPWA at last note/certification: Improved at [address] [place] [address] Status:  Progressing, AROM Left Shoulder Flx 92* ABD 70*  5.  Pt will have decreased pain at 2/10 with performing activity with the left shoulder for carryover to increased use of the left UE with daily actvity              Status at last note/certification:  Progressing, reports 3/10 pain throughout TE today.               Current Status: Continues to report 3/10 pain at the start and end of tx   6.  Pt will improve FOTO score to 65 in 16 visits to show significant improvement for progress to normal function             Status at last note/certification: FOTO = 45             Current Status: FOTO = 51    PLAN  []  Upgrade activities as tolerated     []  Continue plan of care  []  Update interventions per flow sheet       []  Discharge due to:_  []  Other:_      Deepa Laurent PTA 5/26/2022  9:36 AM    Future Appointments   Date Time Provider Billie Turcios   6/1/2022  8:15 AM JOSE Robbins [facility] ANNAMARIE CAMACHO   8/1/2022  8:00 AM Deo Ford MD [facility] BS AMB

## 2022-05-26 NOTE — PROGRESS NOTES
[facility] [address] [facility] [address]  [address]  Phone: [phone]      Fax:  [phone]    Progress Note  Patient name: Basim Yi Mail.  Start of Care: 3/21/2022   Referral Ranjith Juarez MD : 1960               Medical Diagnosis: Left shoulder pain [M25.512]  Payor: BLUE CROSS / Plan: Trecrispin Y Yoseph [id number] PPO / Product Type: PPO /  Onset Date:3/1022               Treatment Diagnosis: Left Shoulder Pain   Prior Hospitalization: see medical history Provider#: [id number]   Medications: Verified on Patient summary List   Comorbidities: None noted  Prior Level of Function: Able to use the left shoulder to perform normal activity and work tasks         Visits from [facility] of Care: 13    Missed Visits: 0    Established Goals:         Updated Goals: to be achieved in 10 treatments:  1.  Pt to tolerate 30 min or more of TE and/or Interventions w/o increased s/s                        Status at last note/certification: Pt tolerated 46 min of treatment with increased s/s                             Current Status:  Progressing, continues to tolerate treatment well with minor increase in pain            2.  Pt will report 50% improvement or better with Left shoulder dysfunction to show a significant increase in ability to tolerate ADL             Status at last note/certification: Not assess at this point             Current Status: met, reports 50% improvement               3.  Pt will demonstrate PROM to 120* Flx/ABD for progress to AAROM/AROM to 120* with good tolerance and little pain              Status at last note/certification:  Flx 97 ABD 83              Current Status: Progressing PROM at Flx 131 , AAROM flexion 116*    4.  Pt will demonstrate AROM left shoulder Flx/ABD to 90* for progress to performing light activity at shoulder level with little difficulty              Status at last note/certification: Improved at [facility] 75 4/7/22             Current Status:  Progressing, AROM Left Shoulder Flx 92* ABD 70*  5.  Pt will have decreased pain at 2/10 with performing activity with the left shoulder for carryover to increased use of the left UE with daily actvity              Status at last note/certification:  Progressing, reports 3/10 pain throughout TE today.               Current Status: Continues to report 3/10 pain at the start and end of tx   6.  Pt will improve FOTO score to 65 in 16 visits to show significant improvement for progress to normal function             Status at last note/certification: FOTO = 45             Current Status: FOTO = 51    Key Functional Changes: Patient has shown good progress with this treatment program. Pain as of last visit was 3/10. Patient has shown decreased pain and increased strength and mobility. Patient reports 50% improvement with overall involvement. FOTO score is 51.       Updated Goals: to be achieved in 10 treatments:   Updated Goals: to be achieved in 10 treatments:  1.  Pt to tolerate 30 min or more of TE and/or Interventions w/o increased s/s                        Status at last note/certification:  Progressing, continues to tolerate treatment well with minor increase in pain                              Current Status:           2.  Pt will report 75% improvement or better with Left shoulder dysfunction to show a significant increase in ability to tolerate ADL             Status at last note/certification:  reports 50% improvement             Current Status:               3.  Pt will demonstrate PROM to 120* Flx/ABD for progress to AAROM/AROM to 120* with good tolerance and little pain              Status at last note/certification: Progressing PROM Met at Flx 131 ;  AAROM flexion 116*               Current Status:   4.  Pt will demonstrate AROM left shoulder Flx/ABD to 90* for progress to performing light activity at shoulder level with little difficulty              REVA at last note/certification: Progressing, AROM Left Shoulder Flx 92* ABD 70*             Current Status:    5.  Pt will have decreased pain at 2/10 with performing activity with the left shoulder for carryover to increased use of the left UE with daily actvity              Status at last note/certification:  Continues to report 3/10 pain at the start and end of tx               Current Status:   6.  Pt will improve FOTO score to 65 in 16 visits to show significant improvement for progress to normal function             Status at last note/certification: FOTO = 51             Current Status:     ASSESSMENT/RECOMMENDATIONS:  []Continue therapy per initial plan/protocol at a frequency of  2 x per week for 10 visits   []Continue therapy with the following recommended changes:_____________________      _____________________________________________________________________  []Discontinue therapy progressing towards or have reached established goals  []Discontinue therapy due to lack of appreciable progress towards goals  []Discontinue therapy due to lack of attendance or compliance  []Await Physician's recommendations/decisions regarding therapy  []Other:________________________________________________________________    Thank you for this referral.   Makeda Thomas, DAYSI 5/26/2022 9:39 AM  NOTE TO PHYSICIAN:  [address]   FAX TO [facility]: [phone]  If you are unable to process this request in 24 hours please contact our office:   [phone]  []  I have read the above report and request that my patient continue as recommended. []  I have read the above report and request that my patient continue therapy with the following changes/special instructions:________________________________________  []I have read the above report and request that my patient be discharged from therapy.     Physician's Signature:____________Date:_________TIME:________     Chely Bolton MD  ** Signature, Date and Time must be completed for valid certification **

## 2022-05-31 NOTE — PROGRESS NOTES
León Conley.  1960     HISTORY OF PRESENT ILLNESS  León Shen is a 63 y.o. 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 Patient has been going to PT. Describes pain as a 5/10. He doesn't have pain when he is just resting with his arm by his side. Has been taking nothing for pain. Still has night pain. He reports working on his exercises every hour while he is awake. He is doing all he can with his arm to include yard work and things around the house. Patient denies any fever, chills, chest pain, shortness of breath or calf pain. There are no new illness or injuries to report since last seen in the office. PHYSICAL EXAM:   Visit Vitals  Temp 97.7 °F (36.5 °C) (Temporal)   Ht 5' 7\" (1.702 m)   Wt 154 lb (69.9 kg)   BMI 24.12 kg/m²      The patient is a well-developed, well-nourished male in no acute distress. The patient is alert and oriented times three. The patient appears to be well groomed. Mood and affect are normal.  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 He needs to transition to a [facility] for financial reasons. He is making progress and I think that will be fine at this point. Stressed to patient that nothing causes an increase in pain. Pt not given a refill of pain medication today.   RTC 4 weeks      LEBRON Abel [facility] and Sp[facility] Specialist

## 2022-06-01 ENCOUNTER — OFFICE VISIT (OUTPATIENT)
Dept: ORTHOPEDIC SURGERY | Age: 62
End: 2022-06-01
Payer: COMMERCIAL

## 2022-06-01 VITALS — BODY MASS INDEX: 24.17 KG/M2 | WEIGHT: 154 LBS | TEMPERATURE: 97.7 F | HEIGHT: 67 IN

## 2022-06-01 DIAGNOSIS — M25.612 SHOULDER STIFFNESS, LEFT: ICD-10-CM

## 2022-06-01 DIAGNOSIS — S46.102D INJURY OF TENDON OF LONG HEAD OF LEFT BICEPS, SUBSEQUENT ENCOUNTER: ICD-10-CM

## 2022-06-01 DIAGNOSIS — Z98.890 STATUS POST ARTHROSCOPY OF LEFT SHOULDER: Primary | ICD-10-CM

## 2022-06-01 DIAGNOSIS — S46.012D TRAUMATIC COMPLETE TEAR OF LEFT ROTATOR CUFF, SUBSEQUENT ENCOUNTER: ICD-10-CM

## 2022-06-01 PROCEDURE — 99214 OFFICE O/P EST MOD 30 MIN: CPT | Performed by: ORTHOPAEDIC SURGERY

## 2022-06-07 ENCOUNTER — DOCUMENTATION ONLY (OUTPATIENT)
Dept: ORTHOPEDIC SURGERY | Age: 62
End: 2022-06-07

## 2022-06-09 ENCOUNTER — TELEPHONE (OUTPATIENT)
Dept: PHYSICAL THERAPY | Age: 62
End: 2022-06-09

## 2022-06-14 NOTE — PROGRESS NOTES
[facility] at [address] [facility]  [address]  Phone: [phone]      Fax:  [phone]    Discharge Summary    Patient name: Basim Ambriz  Start of Care: 3/21/2022   Referral Mary Moctezuma MD : 1960               Medical Diagnosis: Left shoulder pain [M25.512]  Payor: BLUE CROSS / Plan: Karan Hameed [id number] PPO / Product Type: PPO /  Onset Date:3/1022               Treatment Diagnosis: Left Shoulder Pain   Prior Hospitalization: see medical history Provider#: [id number]   Medications: Verified on Patient summary List   Comorbidities: None noted  Prior Level of Function: Able to use the left shoulder to perform normal activity and work tasks        Visits from Start of Care: 13    Missed Visits: 0    Reporting Period : 22 to 22      Updated Goals: to be achieved in 10 treatments:  1.  Pt to tolerate 30 min or more of TE and/or Interventions w/o increased s/s                        Status at last note/certification: Pt tolerated 46 min of treatment with increased s/s                             Current Status:  Not Met, Progressing, continues to tolerate treatment well with minor increase in pain            2.  Pt will report 50% improvement or better with Left shoulder dysfunction to show a significant increase in ability to tolerate ADL             Status at last note/certification: Not assess at this point             Current Status: Met, reports 50% improvement               3.  Pt will demonstrate PROM to 120* Flx/ABD for progress to AAROM/AROM to 120* with good tolerance and little pain              Status at last note/certification:  Flx 97 ABD 83              Current Status: Not Met, Progressing PROM at Flx 131 , AAROM flexion 116*    4.  Pt will demonstrate AROM left shoulder Flx/ABD to 90* for progress to performing light activity at shoulder level with little difficulty              Status at last note/certification: Improved at [address] [facility] Status:  Not Met, Progressing, AROM Left Shoulder Flx 92* ABD 70*  5.  Pt will have decreased pain at 2/10 with performing activity with the left shoulder for carryover to increased use of the left UE with daily actvity              Status at last note/certification:  Progressing, reports 3/10 pain throughout TE today.               Current Status: Not Met, Continues to report 3/10 pain at the start and end of tx   6.  Pt will improve FOTO score to 65 in 16 visits to show significant improvement for progress to normal function             Status at last note/certification: FOTO = 45             Current Status: Not Met, FOTO = 51      Assessment/ Summary of Care:  Patient has shown Fair - good progress with this treatment program. Pain as of last visit was 3/10. Patient has shown decreased pain and increased strength and mobility but he continues to be limited. Patient reports 50% improvement with overall involvement. Patient has fluctuating mobility of the left UE and some days are better than others. He continues to have weakness and limited mobility of the Left Shoulder and would benefit from continued HEP for strength gains. He continues to have  FOTO score is 51.     RECOMMENDATIONS:  [x]Discontinue therapy: []Patient has reached or is progressing toward set goals      [x]atient reports DC per MD Request      []Due to lack of appreciable progress towards set goals    Yulisa Edmondson, PT 6/14/2022 11:45 AM

## 2022-06-29 ENCOUNTER — OFFICE VISIT (OUTPATIENT)
Dept: ORTHOPEDIC SURGERY | Age: 62
End: 2022-06-29
Payer: COMMERCIAL

## 2022-06-29 VITALS
HEIGHT: 67 IN | TEMPERATURE: 97.2 F | HEART RATE: 89 BPM | DIASTOLIC BLOOD PRESSURE: 76 MMHG | OXYGEN SATURATION: 98 % | SYSTOLIC BLOOD PRESSURE: 136 MMHG | WEIGHT: 155.4 LBS | BODY MASS INDEX: 24.39 KG/M2

## 2022-06-29 DIAGNOSIS — S46.012D TRAUMATIC COMPLETE TEAR OF LEFT ROTATOR CUFF, SUBSEQUENT ENCOUNTER: ICD-10-CM

## 2022-06-29 DIAGNOSIS — Z98.890 STATUS POST ARTHROSCOPY OF LEFT SHOULDER: Primary | ICD-10-CM

## 2022-06-29 DIAGNOSIS — M25.612 SHOULDER STIFFNESS, LEFT: ICD-10-CM

## 2022-06-29 DIAGNOSIS — S46.102D INJURY OF TENDON OF LONG HEAD OF LEFT BICEPS, SUBSEQUENT ENCOUNTER: ICD-10-CM

## 2022-06-29 PROCEDURE — 99214 OFFICE O/P EST MOD 30 MIN: CPT | Performed by: ORTHOPAEDIC SURGERY

## 2022-06-29 PROCEDURE — 73030 X-RAY EXAM OF SHOULDER: CPT | Performed by: ORTHOPAEDIC SURGERY

## 2022-06-29 NOTE — PROGRESS NOTES
Butch Lopez.  1960     HISTORY OF PRESENT ILLNESS  Butch Chou is a 63 y.o. 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 Patient has been going to PT. Describes pain as a 6/10. He doesn't have pain when he is just resting with his arm by his side. Has been taking nothing for pain. Still has night pain. He reports working on his exercises every hour while he is awake. He is doing all he can with his arm to include yard work and things around the house. He would like to get back into PT. Patient denies any fever, chills, chest pain, shortness of breath or calf pain. There are no new illness or injuries to report since last seen in the office. PHYSICAL EXAM:   Visit Vitals  /76 (BP 1 Location: Right arm, BP Patient Position: Sitting, BP Cuff Size: Large adult)   Pulse 89   Temp 97.2 °F (36.2 °C) (Temporal)   Ht 5' 7\" (1.702 m)   Wt 155 lb 6.4 oz (70.5 kg)   SpO2 98%   BMI 24.34 kg/m²      The patient is a well-developed, well-nourished male in no acute distress. The patient is alert and oriented times three. The patient appears to be well groomed. Mood and affect are normal.  ORTHOPEDIC EXAM of left shoulder:  Inspection: swelling not present,  Bruising not present  Incisions well healed  Passive glenohumeral abduction 0-80 degrees, 160 PFF, 110 AFF, 60 PER  Stability: Stable  Strength: 4/5  2+ distal pulses    XR: 3 views of the left shoulder 6/29/22 show HO of the proximal humerus and a osteophyte off the acromion, mild OA of the glenohumeral joint. 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 He would like to start PT so we will order this today.   Stressed to patient that nothing causes an increase in pain. I offered a cortisone injection to help with pain but he would like to try PT first.  Pt not given a refill of pain medication today.   RTC 3 weeks      LEBRON [facility]ist

## 2022-07-06 ENCOUNTER — DOCUMENTATION ONLY (OUTPATIENT)
Dept: ORTHOPEDIC SURGERY | Age: 62
End: 2022-07-06

## 2022-07-25 ENCOUNTER — DOCUMENTATION ONLY (OUTPATIENT)
Dept: ORTHOPEDIC SURGERY | Age: 62
End: 2022-07-25

## 2022-07-29 ENCOUNTER — OFFICE VISIT (OUTPATIENT)
Dept: ORTHOPEDIC SURGERY | Age: 62
End: 2022-07-29
Payer: COMMERCIAL

## 2022-07-29 VITALS — WEIGHT: 153.6 LBS | TEMPERATURE: 97.7 F | BODY MASS INDEX: 24.11 KG/M2 | HEIGHT: 67 IN

## 2022-07-29 DIAGNOSIS — S46.012D TRAUMATIC COMPLETE TEAR OF LEFT ROTATOR CUFF, SUBSEQUENT ENCOUNTER: ICD-10-CM

## 2022-07-29 DIAGNOSIS — Z98.890 STATUS POST ARTHROSCOPY OF LEFT SHOULDER: Primary | ICD-10-CM

## 2022-07-29 DIAGNOSIS — M89.8X9 HETEROTOPIC OSSIFICATION OF BONE: ICD-10-CM

## 2022-07-29 PROCEDURE — 99214 OFFICE O/P EST MOD 30 MIN: CPT | Performed by: ORTHOPAEDIC SURGERY

## 2022-07-29 PROCEDURE — 73030 X-RAY EXAM OF SHOULDER: CPT | Performed by: ORTHOPAEDIC SURGERY

## 2022-07-29 NOTE — PROGRESS NOTES
Patient: Lake Beauchamp. MRN: [id number]       SSN: [id number]  YOB: 1960        AGE: 63 y.o. SEX: male  Body mass index is 24.06 kg/m². PCP: Iban Arana MD  07/29/22    Chief Complaint: Left shoulder follow up    HPI: Lake Beauchamp. is a 63 y.o. male patient who is now about 1 year out from his left shoulder massive rotator cuff repair and subsequent capsular release. At the time of his capsular release a large piece of heterotopic bone was found. He continues to have pain and stiffness in the left shoulder. Past Medical History:   Diagnosis Date    Hypertension        History reviewed. No pertinent family history. Current Outpatient Medications   Medication Sig Dispense Refill    ergocalciferol (Vitamin D2) 1,250 mcg (50,000 unit) capsule Take 1 Capsule by mouth every seven (7) days. 12 Capsule 1    methocarbamoL (ROBAXIN) 500 mg tablet TAKE 1 TABLET BY MOUTH TWO (2) TIMES DAILY AS NEEDED FOR MUSCLE SPASM(S). (Patient not taking: Reported on 5/2/2022) 40 Tablet 1    diclofenac EC (VOLTAREN) 50 mg EC tablet Take 1 Tablet by mouth two (2) times a day. (Patient not taking: Reported on 5/2/2022) 40 Tablet 1    amLODIPine (NORVASC) 5 mg tablet Take 1 Tab by mouth daily. (Patient not taking: Reported on 5/2/2022) 30 Tab 5       No Known Allergies    Past Surgical History:   Procedure Laterality Date    NM ANESTH,SURGERY OF SHOULDER Left 08/12/2021    left shoulder A/S RCR BTS    NM COLONOSCOPY W/BIOPSY SINGLE/MULTIPLE  1-8-16    Dr. Flores Frederic       Social History     Socioeconomic History    Marital status:      Spouse name: Not on file    Number of children: Not on file    Years of education: Not on file    Highest education level: Not on file   Occupational History    Not on file   Tobacco Use    Smoking status: Never    Smokeless tobacco: Never   Vaping Use    Vaping Use: Never used   Substance and Sexual Activity    Alcohol use:  Yes Alcohol/week: 8.0 standard drinks     Types: 8 Cans of beer per week     Comment: weekends    Drug use: No    Sexual activity: Yes   Other Topics Concern    Not on file   Social History Narrative    Not on file     Social Determinants of Health     Financial Resource Strain: Not on file   Food Insecurity: Not on file   Transportation Needs: Not on file   Physical Activity: Not on file   Stress: Not on file   Social Connections: Not on file   Intimate Partner Violence: Not on file   Housing Stability: Not on file       REVIEW OF SYSTEMS:      No changes from previous review of systems unless noted. PHYSICAL EXAMINATION:  Visit Vitals  Temp 97.7 °F (36.5 °C) (Temporal)   Ht 5' 7\" (1.702 m)   Wt 153 lb 9.6 oz (69.7 kg)   BMI 24.06 kg/m²     Body mass index is 24.06 kg/m². GENERAL: Alert and oriented x3, in no acute distress. HEENT: Normocephalic, atraumatic. Left shoulder lacks external rotation as well as forward flexion. He has pain with rotator cuff strength testing. Slight crepitus with shoulder range of motion as well. IMAGING:  Imaging read by myself and interpreted as follows:  X-rays 4 views left shoulder were taken the office today. Arthritic changes noted in the glenohumeral joint. There is also a large posterior superior heterotopic ossification off of the proximal humerus. ASSESSMENT & PLAN  Diagnosis: Left shoulder pain, arthritis, heterotopic ossification and stiffness    Talisha Moya is now 1 year out from surgery and continues to have pain and stiffness and his x-rays show heterotopic ossification. In order to better view the heterotopic ossification I have ordered a CT scan of the left shoulder. Also in order to view the rotator cuff repair I have ordered a repeat MRI of the left shoulder and I would like to see him back after these are completed. I do not recall seeing heterotopic ossification like this after rotator cuff surgery before. I discussed this with him at length.   At this point he is not ready to return to work due to his left shoulder dysfunction. Prescription medication management discussed with patient. Electronically signed by: Jacques Puente MD    Note: This note was completed using voice recognition software.   Any typographical/name errors or mistakes are unintentional.

## 2022-08-04 ENCOUNTER — OFFICE VISIT (OUTPATIENT)
Dept: FAMILY MEDICINE CLINIC | Age: 62
End: 2022-08-04
Payer: COMMERCIAL

## 2022-08-04 VITALS
OXYGEN SATURATION: 99 % | WEIGHT: 153.8 LBS | HEIGHT: 67 IN | BODY MASS INDEX: 24.14 KG/M2 | HEART RATE: 67 BPM | TEMPERATURE: 97.5 F | SYSTOLIC BLOOD PRESSURE: 148 MMHG | DIASTOLIC BLOOD PRESSURE: 80 MMHG | RESPIRATION RATE: 14 BRPM

## 2022-08-04 DIAGNOSIS — E55.9 VITAMIN D DEFICIENCY: ICD-10-CM

## 2022-08-04 DIAGNOSIS — Z12.11 COLON CANCER SCREENING: ICD-10-CM

## 2022-08-04 DIAGNOSIS — I10 ESSENTIAL HYPERTENSION: Primary | ICD-10-CM

## 2022-08-04 PROCEDURE — 99214 OFFICE O/P EST MOD 30 MIN: CPT | Performed by: FAMILY MEDICINE

## 2022-08-04 RX ORDER — ERGOCALCIFEROL 1.25 MG/1
50000 CAPSULE ORAL
Qty: 12 CAPSULE | Refills: 1 | Status: SHIPPED | OUTPATIENT
Start: 2022-08-04

## 2022-08-04 RX ORDER — AMLODIPINE BESYLATE 5 MG/1
5 TABLET ORAL DAILY
Qty: 30 TABLET | Refills: 5 | Status: SHIPPED | OUTPATIENT
Start: 2022-08-04

## 2022-08-04 NOTE — PROGRESS NOTES
Chief Complaint   Patient presents with    Vitamin D Deficiency     Patient here today for 3 month f/u on his Vitamin D. He is asking for refills on his supplement today. 1. \"Have you been to the ER, urgent care clinic since your last visit? Hospitalized since your last visit? \"  Admitted to [facility] for Kidney stones    2. \"Have you seen or consulted any other health care providers outside of the [address] [place] [address] since your last visit? \" No     3. For patients aged 39-70: Has the patient had a colonoscopy / FIT/ Cologuard? No       If the patient is female:    4. For patients aged 41-77: Has the patient had a mammogram within the past 2 years? NA - based on age or sex      11. For patients aged 21-65: Has the patient had a pap smear?  NA - based on age or sex

## 2022-08-12 ENCOUNTER — DOCUMENTATION ONLY (OUTPATIENT)
Dept: ORTHOPEDIC SURGERY | Age: 62
End: 2022-08-12

## 2022-08-12 NOTE — PROGRESS NOTES
[employer] faxed over Restrictions Form to [facility]. Form sent to  for completion. Blank copy scanned in to CC.

## 2022-08-15 ENCOUNTER — HOSPITAL ENCOUNTER (OUTPATIENT)
Age: 62
Discharge: HOME | End: 2022-08-15
Attending: ORTHOPAEDIC SURGERY

## 2022-08-15 ENCOUNTER — HOSPITAL ENCOUNTER (OUTPATIENT)
Dept: CT IMAGING | Age: 62
Discharge: HOME | End: 2022-08-15
Attending: ORTHOPAEDIC SURGERY

## 2022-08-15 DIAGNOSIS — M89.8X9 HETEROTOPIC OSSIFICATION OF BONE: ICD-10-CM

## 2022-08-15 DIAGNOSIS — S46.012D TRAUMATIC COMPLETE TEAR OF LEFT ROTATOR CUFF, SUBSEQUENT ENCOUNTER: ICD-10-CM

## 2022-08-15 PROCEDURE — 73200 CT UPPER EXTREMITY W/O DYE: CPT

## 2022-08-15 PROCEDURE — 73221 MRI JOINT UPR EXTREM W/O DYE: CPT

## 2022-11-04 ENCOUNTER — OFFICE VISIT (OUTPATIENT)
Dept: ORTHOPEDIC SURGERY | Age: 62
End: 2022-11-04
Payer: COMMERCIAL

## 2022-11-04 VITALS
RESPIRATION RATE: 18 BRPM | WEIGHT: 150 LBS | OXYGEN SATURATION: 97 % | BODY MASS INDEX: 23.54 KG/M2 | HEART RATE: 83 BPM | TEMPERATURE: 97.7 F | HEIGHT: 67 IN

## 2022-11-04 DIAGNOSIS — Z98.890 STATUS POST ARTHROSCOPY OF LEFT SHOULDER: Primary | ICD-10-CM

## 2022-11-04 DIAGNOSIS — M89.8X9 HETEROTOPIC OSSIFICATION OF BONE: ICD-10-CM

## 2022-11-04 PROCEDURE — 99214 OFFICE O/P EST MOD 30 MIN: CPT | Performed by: ORTHOPAEDIC SURGERY

## 2022-11-04 NOTE — PROGRESS NOTES
Patient: Víctor Joe MRN: [id number]       SSN: [id number]  YOB: 1960        AGE: 63 y.o. SEX: male  Body mass index is 23.49 kg/m². PCP: Belkis Quach MD  11/04/22    Chief Complaint: Left shoulder follow up     HPI: Víctor Joe is a 63 y.o. male patient who returns today for his left shoulder. He has had his MRI and CT scan. His range of motion is improving some and his pain is improving some but he still does have some limitations in his motion and pain in the left shoulder. Past Medical History:   Diagnosis Date    Hypertension        No family history on file. Current Outpatient Medications   Medication Sig Dispense Refill    ergocalciferol (Vitamin D2) 1,250 mcg (50,000 unit) capsule Take 1 Capsule by mouth every seven (7) days. 12 Capsule 1    amLODIPine (NORVASC) 5 mg tablet Take 1 Tablet by mouth in the morning. 30 Tablet 5    methocarbamoL (ROBAXIN) 500 mg tablet TAKE 1 TABLET BY MOUTH TWO (2) TIMES DAILY AS NEEDED FOR MUSCLE SPASM(S). (Patient not taking: No sig reported) 40 Tablet 1    diclofenac EC (VOLTAREN) 50 mg EC tablet Take 1 Tablet by mouth two (2) times a day. 40 Tablet 1       No Known Allergies    Past Surgical History:   Procedure Laterality Date    OR ANESTH,SURGERY OF SHOULDER Left 08/12/2021    left shoulder A/S RCR BTS    OR COLONOSCOPY W/BIOPSY SINGLE/MULTIPLE  1-8-16    Dr. Chintan Lindsay       Social History     Socioeconomic History    Marital status:      Spouse name: Not on file    Number of children: Not on file    Years of education: Not on file    Highest education level: Not on file   Occupational History    Not on file   Tobacco Use    Smoking status: Never    Smokeless tobacco: Never   Vaping Use    Vaping Use: Never used   Substance and Sexual Activity    Alcohol use:  Yes     Alcohol/week: 8.0 standard drinks     Types: 8 Cans of beer per week     Comment: weekends    Drug use: No    Sexual activity: Yes Other Topics Concern    Not on file   Social History Narrative    Not on file     Social Determinants of Health     Financial Resource Strain: Not on file   Food Insecurity: Not on file   Transportation Needs: Not on file   Physical Activity: Not on file   Stress: Not on file   Social Connections: Not on file   Intimate Partner Violence: Not on file   Housing Stability: Not on file       REVIEW OF SYSTEMS:      No changes from previous review of systems unless noted. PHYSICAL EXAMINATION:  Visit Vitals  Pulse 83   Temp 97.7 °F (36.5 °C)   Resp 18   Ht 5' 7\" (1.702 m)   Wt 150 lb (68 kg)   SpO2 97%   BMI 23.49 kg/m²     Body mass index is 23.49 kg/m². GENERAL: Alert and oriented x3, in no acute distress. HEENT: Normocephalic, atraumatic. Shoulder Examination     R   L  ROM   FF  Full   140  ER  Full   30   IR  Full   LS  Rotator Cuff Pain   Supra  -   -   Infra  -   +   Subscap -   -  Crepitus  -   -  Effusion  -   -  Warmth  -   -   Erythema  -   -  Instability  -   -  AC Joint TTP  -   -  Clavicle   Deformity -   -   TTP  -   -  Proximal Humerus   Deformity -   -   TTP  -   -  Deltoid Strength 5   5  Biceps Strength 5   5  Biceps Deformity -   -  Biceps Groove Pain -   -  Impingement Sign -   -       IMAGING:  Imaging read by myself and interpreted as follows:  X-rays and a CT scan of the left shoulder as well as an MRI were reviewed in the office today. The MRI and the CT scan showed a large area of heterotopic ossification in the infraspinatus approximately 3 x 4 cm. ASSESSMENT & PLAN  Diagnosis: Left shoulder stiffness, pain, heterotopic ossification after rotator cuff repair    At this point for [address] his range of motion and pain are improving. I would not recommend any further surgery as he has a large area of heterotopic ossification in the posterior superior rotator cuff and I think taking this out would basically leave him with a rotator cuff deficient shoulder.   I also think that this would be a very difficult area to get to to resect the heterotopic ossification and would possibly require an open posterior incision. If I were going to resect this I would likely resected and plan for placement of a reverse shoulder arthroplasty at the time of resection. I think this would be somewhat disabling for him or even more disabling than he currently is. He would just did not proceed with any further surgery which I think is reasonable at this time. Therefore I have recommended he go for a permanent partial disability rating by physical therapy we will likely use that to make his disability of his left shoulder permanent. Follow-up after that is completed. Prescription medication management discussed with patient. Electronically signed by: Adri Kim MD    Note: This note was completed using voice recognition software.   Any typographical/name errors or mistakes are unintentional.

## 2022-11-22 ENCOUNTER — HOSPITAL ENCOUNTER (OUTPATIENT)
Dept: PHYSICAL THERAPY | Age: 62
Discharge: HOME | End: 2022-11-22
Attending: ORTHOPAEDIC SURGERY
Payer: COMMERCIAL

## 2022-11-22 PROCEDURE — 97110 THERAPEUTIC EXERCISES: CPT

## 2022-11-22 PROCEDURE — 97140 MANUAL THERAPY 1/> REGIONS: CPT

## 2022-11-22 PROCEDURE — 97161 PT EVAL LOW COMPLEX 20 MIN: CPT

## 2022-11-22 NOTE — PROGRESS NOTES
[facility] at [address] [facility]  [address]  Phone: [phone]      Fax:  [phone]    Plan of Care/ Statement of Necessity for Physical Therapy Services  Patient name: Becca Obrien. Start of Care: 2022   Referral source: Saloni Farfan MD : 1960    Medical Diagnosis: Pain in left shoulder [M25.512]  Strain of muscle(s) and tendon(s) of the rotator cuff of left shoulder, subsequent encounter [S46.012D]  [facility]: [facility] / Plan: [address] / Product Type: PPO /  Onset Date: Aug 2021    Treatment Diagnosis: Left Shoulder Pain   Prior Hospitalization: see medical history Provider#: [id number]   Medications: Verified on Patient summary List   Comorbidities: OA, HTN  Prior Level of Function: Shoulder pain and discomfort prior to and after surgery        The Plan of Care and following information is based on the information from the initial evaluation. Assessment/ key information: Patient is a 63 y.o. male referred to PT with the above Dx. Patient seen today for for c/o Left Shoulder pain since surgery for a RCR in Aug 2021. Patient was seen in this clinic after surgery with a difficult recovery period and limited ROM. Patient return for additional treatment to aid with functional activity and progress towards a permanent disability rating. Patient reports pain with reaching up resulting in an achy feeling and limitations with overhead activity. Patient presents to PT with decreased strength, decreased flexibility, and decreased mobility of the Left shoulder. Patient s/s appear to be consistent w/ diagnosis. Patient demonstrates the potential to make functional gains within a reasonable time frame and will benefit from skilled PT to address impairments and improve functional mobility and strength for an improved quality of life.   Fall Risk Assessment: Patient demonstrates no Fall Risk    Evaluation Complexity History HIGH Complexity :3+ comorbidities / personal factors will impact the outcome/ POC ; Examination LOW Complexity : 1-2 Standardized tests and measures addressing body structure, function, activity limitation and / or participation in recreation  ;Presentation LOW Complexity : Stable, uncomplicated  ;Clinical Decision Making MEDIUM Complexity : FOTO score of 26-74  Overall Complexity Rating: LOW   Problem List: pain affecting function, decrease ROM, decrease strength, decrease ADL/ functional abilitiies, decrease activity tolerance, decrease flexibility/ joint mobility, decrease transfer abilities, and other FOTO = 53    Treatment Plan may include any combination of the following: Therapeutic exercise, Neuromuscular reeducation, Manual therapy, Therapeutic activity, Self care/home management, Electric stim unattended , Ultrasound, Mechanical traction, Electric stim attended, Needle insertion w/o injection (1 or 2 muscles), and Needle insertion w/o injection (3+ muscles)  Patient / Family readiness to learn indicated by: asking questions, trying to perform skills, and interest  Persons(s) to be included in education: patient (P)  Barriers to Learning/Limitations: None  Patient Goal (s): Get better  Patient Self Reported Health Status: good  Rehabilitation Potential: good    Short Term Goals: To be accomplished in 5 treatments:  1. Pt will be compliant and independent with HEP in order to facilitate PT sessions and aid with self management   Eval Status:  Initiated   Current Status:  2. Pt to tolerate 30 min or more of TE and/or Interventions w/o increased s/s   Eval Status:  Initiated   Current Status:    Long Term Goals: To be accomplished in 10 treatments:  1. Pt will report 50% improvement or better with Left Shoulder dysfunction to show a significant increase in ability to tolerate ADL   Eval Status:  Initiated   Current Status:  2.   Pt will have decreased pain at 2/10 or better with activity for carryover to performing increased ADLs with less discomfort for an improved quality of life    Eval Status:  Pain 4/10 at rest   Current Status:  3. Pt will demonstrate MMT left shoulder Flx/ABD at 4/10 for progress to performing more activity with less difficulty     Eval Status:  MMT Left shoulder Flx/ABD 2+   Current Status:  4. Pt will demonstrate AROM left shoulder Flx/ABD at 110* or better for carryover to more function with overhead activity. Eval Status:  AROM Left Shoulder Flx 85* ABD 62   Current Status:  5. Pt will improve FOTO score to 67 in 15 visits to show significant improvement for progress to good shoulder function of the Left UE   Eval Status: FOTO = 53   Current Status:   Frequency / Duration: Patient to be seen 2-3 times per week for 10 treatments. Patient/ Caregiver education and instruction: Diagnosis, prognosis, self care, activity modification, and exercises   [x]  Plan of care has been reviewed with DAYSI Perdue, PT 11/22/2022 10:33 AM  _____________________________________________________________________  I certify that the above Therapy Services are being furnished while the patient is under my care. I agree with the treatment plan and certify that this therapy is necessary.     Physician's Signature:____________Date:_________TIME:________     Stacie Sebastian MD  ** Signature, Date and Time must be completed for valid certification **    Please sign and return to [facility] at [address] [facility]  [place], [facility] [address] [facility] [address]  Phone: [phone]      Fax:  [phone]

## 2022-11-22 NOTE — PROGRESS NOTES
PT DAILY TREATMENT NOTE/SHOULDER EVAL     Patient Name: María Benítez. Date:2022  : 1960  [x]  Patient  Verified  Payor: [facility] / Plan: [facility] [address] / Product Type: PPO /    In time:1020  Out time:1113  Total Treatment Time (min): 48  Visit #: 1 of 10    Medicare/BCBS Only   Total Timed Codes (min):    1:1 Treatment Time:          Treatment Area: Pain in left shoulder [M25.512]  Strain of muscle(s) and tendon(s) of the rotator cuff of left shoulder, subsequent encounter [S46.012D]      SUBJECTIVE  Pain Level (0-10 scale): 4  []constant [x]intermittent []improving []worsening []no change since onset     Any medication changes, allergies to medications, adverse drug reactions, diagnosis change, or new procedure performed?: [x] No    [] Yes (see summary sheet for update)  Subjective functional status/changes:       Subjective: Patient is a 63 y.o. male referred to PT with the above Dx. Patient seen today for for c/o Left Shoulder pain since surgery for a RCR in Aug 2021. Patient was seen in this clinic after surgery with a difficult recovery period and limited ROM. Patient return for additional treatment to aid with functional activity and progress towards a permanent disability rating. Patient reports pain with reaching up resulting in an achy feeling and limitations with overhead activity. Patient presents to PT with decreased strength, decreased flexibility, and decreased mobility of the Left shoulder. Patient s/s appear to be consistent w/ diagnosis. Patient demonstrates the potential to make functional gains within a reasonable time frame and will benefit from skilled PT to address impairments and improve functional mobility and strength for an improved quality of life.   Fall Risk Assessment: Patient demonstrates no Fall Risk      Mechanism of Injury: No injury, Pt S/P RCR due to wear and tear of the Rotator Cuff    Comorbidities: OA, HTN  Prior Level of Function: Shoulder pain and discomfort prior to and after surgery       FOTO: 53 / 67 in 15 visits    Goal: Get better    Health Status: good      What type of work do you do? N/A    Living Situation/Domestic Life:    Mobility: (I)  Self Care (I)    What type of daily activities/hobbies? House and yard work    Limitations to Activity/Recreation/PLOF: Reaching overhead. Weakness Left UE, reaching to the side         Barriers: []pain []financial []time []transportation []other    Motivation: High    FABQ Score: []low []elevate    Cognition: A & O x 3    Other:    Risk For Falls:   []No  []low []elevate           OBJECTIVE/EXAMINATION  Posture:   PROM limited by pain  - TTP with muscle tightness at the left Pec/pec minor/ant deltoid/infrapinatus with multiple TrPs       AROM PROM Strength Pain? ?    Right Left Right Left Right Left    Cx Rot          Cx SB          Cx Flx          Cx Ext          Shoulder Flx  85  128      Shoulder Ext  32  51      Shoulder ABD  62  121      Shoulder ADD    5*      Shoulder IR  NT  -5      Shoulder ER  8  32                    Modality rationale: decrease inflammation and decrease pain to improve the patients ability to tolerate post treatment soreness    Min Type Additional Details      [] Estim:  []Unatt       []IFC  []Premod                        []Other:  []w/ice   []w/heat  Position:  Location:      [] Estim: []Att    []TENS instruct  []NMES                    []Other:  []w/US   []w/ice   []w/heat  Position:  Location:      []  Traction: [] Cervical       []Lumbar                       [] Prone          []Supine                       []Intermittent   []Continuous Lbs:  [] before manual  [] after manual      []  Ultrasound: []Continuous   [] Pulsed                           []1MHz   []3MHz W/cm2:  Location:      []  Iontophoresis with dexamethasone         Location: [] Take home patch   [] In clinic     15 [x]  Ice     []  heat  []  Ice massage  []  Laser   []  Anodyne Position: Supine  Location:Left Shoulder      []  Laser with stim  []  Other:  Position:  Location:      []  Vasopneumatic Device Pressure:       [] lo [] med [] hi   Temperature: [] lo [] med [] hi    [] Skin assessment post-treatment:  []intact []redness- no adverse reaction    []redness - adverse reaction:      20 min [x]Eval                  []Re-Eval         10 min Therapeutic Exercise:  [x] See flow sheet : Develop and instruct HEP   Rationale: increase ROM, increase strength, and improve coordination to improve the patients ability to Initiate HEP and improve daily function     8 min Manual Therapy:  PROM with over stretch, STM/DTM Left Ant Deltoid/Pec and infrapinatus   The manual therapy interventions were performed at a separate and distinct time from the therapeutic activities interventions. Rationale: decrease pain, increase ROM, increase tissue extensibility, and decrease trigger points to improve daily function                                                                   With   [x] TE   [] TA   [] neuro   [] other: Patient Education: [x] Review HEP    [] Progressed/Changed HEP based on:   [] positioning   [] body mechanics   [] transfers   [] heat/ice application    [] other:       Other Objective/Functional Measures:      Access Code: [email or web address]  URL: [email or web address]  Date: 11/22/2022  Prepared by: Anjel Tadeo    Exercises  Modified Posterior Capsule Stretch - 2 x daily - 7 x weekly - 3 sets - 10 reps  Standing Shoulder Posterior Capsule Stretch - 2 x daily - 7 x weekly - 3 sets - 10 reps  Circular Shoulder Pendulum with Table Support - 2 x daily - 7 x weekly - 3 sets - 10 reps  Horizontal Shoulder Pendulum with Table Support - 2 x daily - 7 x weekly - 3 sets - 10 reps  Seated Shoulder Flexion Towel Slide at Table Top - 2 x daily - 7 x weekly - 3 sets - 10 reps  Standing Shoulder Shrugs - 2 x daily - 7 x weekly - 3 sets - 10 reps  Standing Scapular Retraction - 2 x daily - 7 x weekly - 3 sets - 10 reps  Standing Shoulder Shrug Circles AROM Backward - 2 x daily - 7 x weekly - 3 sets - 10 reps  Isometric Shoulder Flexion at Wall - 2 x daily - 7 x weekly - 3 sets - 10 reps  Isometric Shoulder Extension at Wall - 2 x daily - 7 x weekly - 3 sets - 10 reps  Isometric Shoulder Abduction at Wall - 2 x daily - 7 x weekly - 3 sets - 10 reps    Pain Level (0-10 scale) post treatment: 4     ASSESSMENT/Changes in Function:    - Pt demo understanding of the HEP      [x]  See Plan of Care  []  See progress note/recertification  []  See Discharge Summary         Progress towards goals / Updated goals:  Short Term Goals: To be accomplished in 5 treatments:  1. Pt will be compliant and independent with HEP in order to facilitate PT sessions and aid with self management   Eval Status:  Initiated   Current Status:  2. Pt to tolerate 30 min or more of TE and/or Interventions w/o increased s/s   Eval Status:  Initiated   Current Status:    Long Term Goals: To be accomplished in 10 treatments:  1. Pt will report 50% improvement or better with Left Shoulder dysfunction to show a significant increase in ability to tolerate ADL   Eval Status:  Initiated   Current Status:  2. Pt will have decreased pain at 2/10 or better with activity for carryover to performing increased ADLs with less discomfort for an improved quality of life    Eval Status:  Pain 4/10 at rest   Current Status:  3. Pt will demonstrate MMT left shoulder Flx/ABD at 4/10 for progress to performing more activity with less difficulty     Eval Status:  MMT Left shoulder Flx/ABD 2+   Current Status:  4. Pt will demonstrate AROM left shoulder Flx/ABD at 110* or better for carryover to more function with overhead activity. Eval Status:  AROM Left Shoulder Flx 85* ABD 62   Current Status:  5.   Pt will improve FOTO score to 67 in 15 visits to show significant improvement for progress to good shoulder function of the Left UE   Eval Status: FOTO = 53   Current Status:      PLAN  [x]  Upgrade activities as tolerated     [x]  Continue plan of care  []  Update interventions per flow sheet       []  Discharge due to:_  []  Other:_       Tobias Aviles, PT 11/22/2022  10:37 AM

## 2022-11-28 ENCOUNTER — HOSPITAL ENCOUNTER (OUTPATIENT)
Dept: PHYSICAL THERAPY | Age: 62
Discharge: HOME | End: 2022-11-28
Attending: ORTHOPAEDIC SURGERY
Payer: COMMERCIAL

## 2022-11-28 PROCEDURE — 97140 MANUAL THERAPY 1/> REGIONS: CPT

## 2022-11-28 PROCEDURE — 97035 APP MDLTY 1+ULTRASOUND EA 15: CPT

## 2022-11-28 PROCEDURE — 97110 THERAPEUTIC EXERCISES: CPT

## 2022-11-28 NOTE — PROGRESS NOTES
PT DAILY TREATMENT NOTE     Patient Name: Víctor Rios.   Date:2022  : 1960  [x]  Patient  Verified  Payor: [facility] HEALTH / Plan: [facility] [address] / Product Type: PPO /    In time:930  Out time:103  Total Treatment Time (min): 66  Visit #: 2 of 10    Medicare/BCBS Only   Total Timed Codes (min):    1:1 Treatment Time:          Treatment Area: Pain in left shoulder [M25.512]  Strain of muscle(s) and tendon(s) of the rotator cuff of left shoulder, subsequent encounter [S46.012D]    SUBJECTIVE  Pain Level (0-10 scale): 5  Any medication changes, allergies to medications, adverse drug reactions, diagnosis change, or new procedure performed?: [x] No    [] Yes (see summary sheet for update)  Subjective functional status/changes:   [] No changes reported  Mostly stiffness    OBJECTIVE    Modality rationale: decrease inflammation, decrease pain, and increase tissue extensibility to improve the patients ability to improve daily activity   Min Type Additional Details    [] Estim:  []Unatt       []IFC  []Premod                        []Other:  []w/ice   []w/heat  Position:  Location:    [] Estim: []Att    []TENS instruct  []NMES                    []Other:  []w/US   []w/ice   []w/heat  Position:  Location:    []  Traction: [] Cervical       []Lumbar                       [] Prone          []Supine                       []Intermittent   []Continuous Lbs:  [] before manual  [] after manual   8 [x]  Ultrasound: [x]Continuous   [] Pulsed                           [x]1MHz   []3MHz W/cm2: 1.7  Location: Left Anterior deltoid/Teres minor    []  Iontophoresis with dexamethasone         Location: [] Take home patch   [] In clinic    []  Ice     []  heat  []  Ice massage  []  Laser   []  Anodyne Position:  Location:    []  Laser with stim  []  Other:  Position:  Location:    []  Vasopneumatic Device    []  Right     []  Left  Pre-treatment girth:  Post-treatment girth:  Measured at (location): Pressure:       [] lo [] med [] hi   Temperature: [] lo [] med [] hi   [x] Skin assessment post-treatment:  [x]intact []redness- no adverse reaction    []redness - adverse reaction:         50 min Therapeutic Exercise:  [x] See flow sheet :   Rationale: increase ROM and increase strength to improve the patients ability to tolerate increased activity leves      8 min Manual Therapy:  KT 2I-Strip for spacing at the left Teres minor and anterior deltoid     The manual therapy interventions were performed at a separate and distinct time from the therapeutic activities interventions. Rationale: decrease pain, increase ROM, increase tissue extensibility, and decrease trigger points to improve daily function              With   [x] TE   [] TA   [] neuro   [] other: Patient Education: [x] Review HEP    [] Progressed/Changed HEP based on:   [] positioning   [] body mechanics   [] transfers   [] heat/ice application    [] other:      Other Objective/Functional Measures:   - Initiate treatment per flow sheet  - Continued muscle tightness at the left anterior deltoid and Teres Minor region     Pain Level (0-10 scale) post treatment: 4    ASSESSMENT/Changes in Function:   - Good tolerance with first full treatment  - Emphasized the need for strength training at the left shoulder    Patient will continue to benefit from skilled PT services to modify and progress therapeutic interventions, address functional mobility deficits, address ROM deficits, address strength deficits, analyze and address soft tissue restrictions, and analyze and cue movement patterns to attain remaining goals. []  See Plan of Care  []  See progress note/recertification  []  See Discharge Summary         Progress towards goals / Updated goals:  Short Term Goals: To be accomplished in 5 treatments:  1.   Pt will be compliant and independent with HEP in order to facilitate PT sessions and aid with self management             Eval Status:  Initiated Current Status: Pt reports compliance with HEP 11/28/22  2. Pt to tolerate 30 min or more of TE and/or Interventions w/o increased s/s             Eval Status:  Initiated             Current Status: Pt tolerate 66 min of treatment with some discomfort during exercises 11/28/22     Long Term Goals: To be accomplished in 10 treatments:  1. Pt will report 50% improvement or better with Left Shoulder dysfunction to show a significant increase in ability to tolerate ADL             Eval Status:  Initiated             Current Status:  2. Pt will have decreased pain at 2/10 or better with activity for carryover to performing increased ADLs with less discomfort for an improved quality of life              Eval Status:  Pain 4/10 at rest             Current Status: Progressing with pain 5/10 11/28/22  3. Pt will demonstrate MMT left shoulder Flx/ABD at 4/10 for progress to performing more activity with less difficulty               Eval Status:  MMT Left shoulder Flx/ABD 2+             Current Status:  4. Pt will demonstrate AROM left shoulder Flx/ABD at 110* or better for carryover to more function with overhead activity. Eval Status:  AROM Left Shoulder Flx 85* ABD 62             Current Status:  5.   Pt will improve FOTO score to 67 in 15 visits to show significant improvement for progress to good shoulder function of the Left UE             Eval Status: FOTO = 53             Current Status:    PLAN  [x]  Upgrade activities as tolerated     [x]  Continue plan of care  []  Update interventions per flow sheet       []  Discharge due to:_  []  Other:_      Florencio Fuentes, PT 11/28/2022  10:37 AM    Future Appointments   Date Time Provider Billie Turcios   12/2/2022  8:45 AM Fanny Merida, PT [facility]   12/5/2022  9:30 AM Cristina Madrid PTA [facility]   12/9/2022  8:45 AM Fanny Merida, PT [facility]   12/12/2022  9:30 AM Cristina Madrid PTA [facility]   12/16/2022  8:45 AM Michelle Sanchez, Janna Villa, PT [facility]   12/19/2022  8:45 AM Fanny Merida, PT [facility]   12/23/2022  9:30 AM Cristina Madrid PTA [facility]   12/26/2022  9:30 AM Cristina Madrid PTA [facility]

## 2022-12-02 ENCOUNTER — APPOINTMENT (OUTPATIENT)
Dept: PHYSICAL THERAPY | Age: 62
End: 2022-12-02
Attending: ORTHOPAEDIC SURGERY
Payer: COMMERCIAL

## 2022-12-05 ENCOUNTER — HOSPITAL ENCOUNTER (OUTPATIENT)
Dept: PHYSICAL THERAPY | Age: 62
Discharge: HOME | End: 2022-12-05
Attending: ORTHOPAEDIC SURGERY
Payer: COMMERCIAL

## 2022-12-05 PROCEDURE — 97035 APP MDLTY 1+ULTRASOUND EA 15: CPT

## 2022-12-05 PROCEDURE — 97110 THERAPEUTIC EXERCISES: CPT

## 2022-12-05 PROCEDURE — 97140 MANUAL THERAPY 1/> REGIONS: CPT

## 2022-12-05 NOTE — PROGRESS NOTES
PT DAILY TREATMENT NOTE     Patient Name: Sudha Barros.   Date:2022  : 1960  [x]  Patient  Verified  Payor: [facility] HEALTH / Plan: [facility] [address] / Product Type: PPO /    In time:929  Out time:1015  Total Treatment Time (min): 55  Visit #: 3 of 10      Treatment Area: Pain in left shoulder [M25.512]  Strain of muscle(s) and tendon(s) of the rotator cuff of left shoulder, subsequent encounter [S46.269D]    SUBJECTIVE  Pain Level (0-10 scale): 5/10  Any medication changes, allergies to medications, adverse drug reactions, diagnosis change, or new procedure performed?: [x] No    [] Yes (see summary sheet for update)  Subjective functional status/changes:   [] No changes reported  Continues to report 5/10 pain     OBJECTIVE  decrease inflammation, decrease pain, and increase tissue extensibility to improve the patients ability to improve daily activity      Min Type Additional Details     [] Estim:  []Unatt       []IFC  []Premod                        []Other:  []w/ice   []w/heat  Position:  Location:     [] Estim: []Att    []TENS instruct  []NMES                    []Other:  []w/US   []w/ice   []w/heat  Position:  Location:     []  Traction: [] Cervical       []Lumbar                       [] Prone          []Supine                       []Intermittent   []Continuous Lbs:  [] before manual  [] after manual   8 [x]  Ultrasound: [x]Continuous   [] Pulsed                           [x]1MHz   []3MHz W/cm2: 1.7  Location: Left Anterior deltoid/Teres minor     []  Iontophoresis with dexamethasone         Location: [] Take home patch   [] In clinic     []  Ice     []  heat  []  Ice massage  []  Laser   []  Anodyne Position:  Location:     []  Laser with stim  []  Other:  Position:  Location:     []  Vasopneumatic Device    []  Right     []  Left  Pre-treatment girth:  Post-treatment girth:  Measured at (location):  Pressure:       [] lo [] med [] hi   Temperature: [] lo [] med [] hi   [x] Skin assessment post-treatment:  [x]intact []redness- no adverse reaction    []redness - adverse reaction:             30 min Therapeutic Exercise:  [x] See flow sheet :   Rationale: increase ROM and increase strength to improve the patients ability to perform ADLs    8 min Manual Therapy:  KT 2I-Strip for spacing at the left Teres minor and anterior deltoid     The manual therapy interventions were performed at a separate and distinct time from the therapeutic activities interventions. Rationale: decrease pain, increase ROM, increase tissue extensibility, and decrease trigger points to improve daily function                  With   [x] TE   [] TA   [] neuro   [] other: Patient Education: [x] Review HEP    [] Progressed/Changed HEP based on:   [] positioning   [] body mechanics   [] transfers   [] heat/ice application    [] other:      Other Objective/Functional Measures:   Noted difficulty with inf cap str   Shoulder flexion with orange Sball on wall      Pain Level (0-10 scale) post treatment: 4/10    ASSESSMENT/Changes in Function: Patient actively participates in therapy. Reports increased pain with ROM, and requires occasional VC/TC for form     Patient will continue to benefit from skilled PT services to modify and progress therapeutic interventions, address functional mobility deficits, address ROM deficits, address strength deficits, analyze and address soft tissue restrictions, analyze and cue movement patterns, and analyze and modify body mechanics/ergonomics to attain remaining goals. []  See Plan of Care  []  See progress note/recertification  []  See Discharge Summary         Progress towards goals / Updated goals:  Short Term Goals: To be accomplished in 5 treatments:  1. Pt will be compliant and independent with HEP in order to facilitate PT sessions and aid with self management             Eval Status:  Initiated             Current Status: Pt reports compliance with HEP 11/28/22  2.   Pt to tolerate 30 min or more of TE and/or Interventions w/o increased s/s             Eval Status:  Initiated             Current Status: Pt tolerate 66 min of treatment with some discomfort during exercises 11/28/22     Long Term Goals: To be accomplished in 10 treatments:  1. Pt will report 50% improvement or better with Left Shoulder dysfunction to show a significant increase in ability to tolerate ADL             Eval Status:  Initiated             Current Status:  2. Pt will have decreased pain at 2/10 or better with activity for carryover to performing increased ADLs with less discomfort for an improved quality of life              Eval Status:  Pain 4/10 at rest             Current Status: Continued 5/10 pain 12/5/22   3. Pt will demonstrate MMT left shoulder Flx/ABD at 4/10 for progress to performing more activity with less difficulty               Eval Status:  MMT Left shoulder Flx/ABD 2+             Current Status:  4. Pt will demonstrate AROM left shoulder Flx/ABD at 110* or better for carryover to more function with overhead activity. Eval Status:  AROM Left Shoulder Flx 85* ABD 62             Current Status:  5.   Pt will improve FOTO score to 67 in 15 visits to show significant improvement for progress to good shoulder function of the Left UE             Eval Status: FOTO = 53             Current Status:    PLAN  [x]  Upgrade activities as tolerated     [x]  Continue plan of care  []  Update interventions per flow sheet       []  Discharge due to:_  []  Other:_      Rosa Hamm PTA 12/5/2022  9:32 AM    Future Appointments   Date Time Provider Billie Turcios   12/9/2022 11:00 AM [address] [place] [address] [facility]   12/12/2022  9:30 AM Amisha Goldstein PTA [facility]   12/16/2022  8:45 AM Jaramillo Point, PT [facility]   12/19/2022  8:45 AM Jaramillo Point, PT [facility]   12/23/2022  9:30 AM Amisha Goldstein PTA [facility]   12/26/2022  9:30 AM Amisha Goldstein PTA [facility]

## 2022-12-09 ENCOUNTER — HOSPITAL ENCOUNTER (OUTPATIENT)
Dept: PHYSICAL THERAPY | Age: 62
Discharge: HOME | End: 2022-12-09
Attending: ORTHOPAEDIC SURGERY
Payer: COMMERCIAL

## 2022-12-09 PROCEDURE — 97530 THERAPEUTIC ACTIVITIES: CPT

## 2022-12-09 PROCEDURE — 97140 MANUAL THERAPY 1/> REGIONS: CPT

## 2022-12-09 PROCEDURE — 97110 THERAPEUTIC EXERCISES: CPT

## 2022-12-09 NOTE — PROGRESS NOTES
PT DAILY TREATMENT NOTE     Patient Name: Janel Augustin. Date:2022  : 1960  [x]  Patient  Verified  Payor: [facility] HEALTH / Plan: [facility] [address] / Product Type: PPO /    In time:11:10A  Out time:11:53A  Total Treatment Time (min): 43  Visit #: 4 of 10      Treatment Area: Pain in left shoulder [M25.512]  Strain of muscle(s) and tendon(s) of the rotator cuff of left shoulder, subsequent encounter [S46.012D]    SUBJECTIVE  Pain Level (0-10 scale): 5/10  Any medication changes, allergies to medications, adverse drug reactions, diagnosis change, or new procedure performed?: [x] No    [] Yes (see summary sheet for update)  Subjective functional status/changes:   [] No changes reported  Patient reports he felt better after last visit; therapy loosens him up. He still has the most difficulty and pain with reaching up. He has not noticed a difference with taping. Doctor recommended another surgery. OBJECTIVE    25 min Therapeutic Exercise:  [] See flow sheet :   Rationale: increase ROM, increase strength, and increase proprioception to improve the patients ability to perform ADLs with increased ease    8 min Therapeutic Activity:  []  See flow sheet : patient education   Rationale: increase ROM, increase proprioception, and decrease pain   to improve the patients ability to perform ADLs with increased ease       10 min Manual Therapy:  STM to right UT, biceps, triceps; grade I inferior and posture GHJ mobilizations to left shoulder    The manual therapy interventions were performed at a separate and distinct time from the therapeutic activities interventions.   Rationale: decrease pain, increase ROM, increase tissue extensibility, and decrease trigger points to perform ADLs with increased ease              With   [] TE   [] TA   [] neuro   [] other: Patient Education: [x] Review HEP    [] Progressed/Changed HEP based on:   [] positioning   [] body mechanics   [] transfers   [] heat/ice application    [x] other: tennis/stress ball for self TPR/massage for home; guarded posture and prolonged sling wear contributing to increased cervical tightness; purpose of cervical stretching      Other Objective/Functional Measures:      Subjective information obtained during UBE  Max TTP to left levator; mod TTP to other parascapular musculature   Positive subjective response to use of tennis ball for self massage  Limited tolerance to PROM/manual intervention  Limited range with shoulder AAROM flex    Patient reported feeling \"looser\" at end of session    Pain Level (0-10 scale) post treatment: 4/10    ASSESSMENT/Changes in Function: Patient presents with limited passive, AA, and active AROM with pain as contributing factor. He presents with cervical and parascapular tightness and impaired posture with increased right cervical sidebend. Patient with good overall tolerance to session reporting decreased pain/tightness following. Patient will continue to benefit from skilled PT services to modify and progress therapeutic interventions, address functional mobility deficits, address ROM deficits, address strength deficits, analyze and address soft tissue restrictions, analyze and cue movement patterns, analyze and modify body mechanics/ergonomics, assess and modify postural abnormalities, and instruct in home and community integration to attain remaining goals. Progress towards goals / Updated goals:  Short Term Goals: To be accomplished in 5 treatments:  1. Pt will be compliant and independent with HEP in order to facilitate PT sessions and aid with self management             Eval Status:  Initiated             Current Status: Pt reports compliance with HEP 11/28/22  2.   Pt to tolerate 30 min or more of TE and/or Interventions w/o increased s/s             Eval Status:  Initiated             Current Status: Pt tolerate 66 min of treatment with some discomfort during exercises 11/28/22     Long Term Goals: To be accomplished in 10 treatments:  1. Pt will report 50% improvement or better with Left Shoulder dysfunction to show a significant increase in ability to tolerate ADL             Eval Status:  Initiated             Current Status:  2. Pt will have decreased pain at 2/10 or better with activity for carryover to performing increased ADLs with less discomfort for an improved quality of life              Eval Status:  Pain 4/10 at rest             Current Status: Continued 5/10 pain 12/5/22   3. Pt will demonstrate MMT left shoulder Flx/ABD at 4/10 for progress to performing more activity with less difficulty               Eval Status:  MMT Left shoulder Flx/ABD 2+             Current Status:  4. Pt will demonstrate AROM left shoulder Flx/ABD at 110* or better for carryover to more function with overhead activity. Eval Status:  AROM Left Shoulder Flx 85* ABD 62             Current Status:  5.   Pt will improve FOTO score to 67 in 15 visits to show significant improvement for progress to good shoulder function of the Left UE             Eval Status: FOTO = 53             Current Status:    PLAN  [x]  Upgrade activities as tolerated     [x]  Continue plan of care  []  Update interventions per flow sheet       []  Discharge due to:_  []  Other:_      Susanne Liner, PT 12/9/2022  9:05 AM    Future Appointments   Date Time Provider Billie Turcois   12/9/2022 11:00 [address] [facility]   12/12/2022  9:30 AM Reina Mayen PTA [facility]   12/16/2022  8:45 AM Reina Mayen PTA [facility]   12/19/2022  8:45 AM Re Monzon PT [facility]   12/23/2022  9:30 AM Reina Mayen PTA [facility]

## 2022-12-12 ENCOUNTER — APPOINTMENT (OUTPATIENT)
Dept: PHYSICAL THERAPY | Age: 62
End: 2022-12-12
Attending: ORTHOPAEDIC SURGERY
Payer: COMMERCIAL

## 2022-12-12 ENCOUNTER — DOCUMENTATION ONLY (OUTPATIENT)
Dept: ORTHOPEDIC SURGERY | Age: 62
End: 2022-12-12

## 2022-12-12 NOTE — PROGRESS NOTES
[employer] faxed over LTD Form to [place] [facility]. Form sent to  for completion. Blank copy scanned in to CC.

## 2022-12-15 ENCOUNTER — DOCUMENTATION ONLY (OUTPATIENT)
Dept: ORTHOPEDIC SURGERY | Age: 62
End: 2022-12-15

## 2022-12-16 ENCOUNTER — HOSPITAL ENCOUNTER (OUTPATIENT)
Dept: PHYSICAL THERAPY | Age: 62
Discharge: HOME | End: 2022-12-16
Attending: ORTHOPAEDIC SURGERY
Payer: COMMERCIAL

## 2022-12-16 PROCEDURE — 97110 THERAPEUTIC EXERCISES: CPT

## 2022-12-16 PROCEDURE — 97530 THERAPEUTIC ACTIVITIES: CPT

## 2022-12-16 PROCEDURE — 97140 MANUAL THERAPY 1/> REGIONS: CPT

## 2022-12-19 ENCOUNTER — HOSPITAL ENCOUNTER (OUTPATIENT)
Dept: PHYSICAL THERAPY | Age: 62
Discharge: HOME | End: 2022-12-19
Attending: ORTHOPAEDIC SURGERY
Payer: COMMERCIAL

## 2022-12-19 PROCEDURE — 97110 THERAPEUTIC EXERCISES: CPT

## 2022-12-19 PROCEDURE — 97014 ELECTRIC STIMULATION THERAPY: CPT

## 2022-12-19 NOTE — PROGRESS NOTES
PT DAILY TREATMENT NOTE     Patient Name: Zaki Torres. Date:2022  : 1960  [x]  Patient  Verified  Payor: [facility] HEALTH / Plan: [address] / Product Type: PPO /    In WZHF:[id number]  Out time:09  Total Treatment Time (min): 50  Visit #: 6 of 10    Medicare/BCBS Only   Total Timed Codes (min):   1:1 Treatment Time:          Treatment Area: Pain in left shoulder [M25.512]  Strain of muscle(s) and tendon(s) of the rotator cuff of left shoulder, subsequent encounter [S46.012D]    SUBJECTIVE  Pain Level (0-10 scale): 5  Any medication changes, allergies to medications, adverse drug reactions, diagnosis change, or new procedure performed?: [x] No    [] Yes (see summary sheet for update)  Subjective functional status/changes:   [] No changes reported  I feel better. Was aching last week when I came in.     OBJECTIVE    Modality rationale: decrease inflammation, decrease pain, and increase tissue extensibility to improve the patients ability to improve activity tolerance   Min Type Additional Details   12 [x] Estim:  [x]Unatt       []IFC  []Premod                        [x]Other: [place] []w/ice   []w/heat  Position: Supine  Location: Left shoulder anterior deltoid and infraspinatus    [] Estim: []Att    []TENS instruct  []NMES                    []Other:  []w/US   []w/ice   []w/heat  Position:  Location:    []  Traction: [] Cervical       []Lumbar                       [] Prone          []Supine                       []Intermittent   []Continuous Lbs:  [] before manual  [] after manual    []  Ultrasound: []Continuous   [] Pulsed                           []1MHz   []3MHz W/cm2:  Location:    []  Iontophoresis with dexamethasone         Location: [] Take home patch   [] In clinic    []  Ice     []  heat  []  Ice massage  []  Laser   []  Anodyne Position:  Location:    []  Laser with stim  []  Other:  Position:  Location:    []  Vasopneumatic Device    []  Right     [] Left  Pre-treatment girth:  Post-treatment girth:  Measured at (location):  Pressure:       [] lo [] med [] hi   Temperature: [] lo [] med [] hi   [x] Skin assessment post-treatment:  [x]intact []redness- no adverse reaction    []redness - adverse reaction:         38 min Therapeutic Exercise:  [] See flow sheet : Assess mobility   Rationale: increase ROM, increase strength, and improve coordination to improve the patients ability to improve functional mobility          With   [x] TE   [x] TA   [x] neuro   [] other: Patient Education: [x] Review HEP    [] Progressed/Changed HEP based on:   [] positioning   [] body mechanics   [] transfers   [] heat/ice application    [] other:      Other Objective/Functional Measures:  - Continued TTP with muscle tightness at the left anterior deltoid and infraspinatus  - Guarding with PROM but improved  - Limited AROM limited by pain  - Trial E-Stim for muscle relaxation at the anterior deltoid and infraspinatus      AROM PROM Strength Pain? ?    Right Left Right Left Right Left    Shoulder Flx  62  127 p!   y   Shoulder Ext  28  51 p! Shoulder ABD  55  133 p! Shoulder ADD          Shoulder IR    33      Shoulder ER  28  56           Post E-Stim AROM PROM Strength Pain? ?    Right Left Right Left Right Left    Shoulder Flx  79        Shoulder Ext  31        Shoulder ABD  65        Shoulder ADD          Shoulder IR          Shoulder ER  29              Pain Level (0-10 scale) post treatment: 4    ASSESSMENT/Changes in Function:   - Improved Mobility and decreased stiffness after treatment    Patient will continue to benefit from skilled PT services to modify and progress therapeutic interventions, address functional mobility deficits, address ROM deficits, address strength deficits, analyze and address soft tissue restrictions, and analyze and cue movement patterns to attain remaining goals.      []  See Plan of Care  []  See progress note/recertification  []  See Discharge Summary         Progress towards goals / Updated goals:  Short Term Goals: To be accomplished in 5 treatments:  1. Pt will be compliant and independent with HEP in order to facilitate PT sessions and aid with self management             Eval Status:  Initiated             Current Status: Pt reports compliance with HEP 11/28/22  2. Pt to tolerate 30 min or more of TE and/or Interventions w/o increased s/s             Eval Status:  Initiated             Current Status: Pt tolerate 66 min of treatment with some discomfort during exercises 11/28/22     Long Term Goals: To be accomplished in 10 treatments:  1. Pt will report 50% improvement or better with Left Shoulder dysfunction to show a significant increase in ability to tolerate ADL             Eval Status:  Initiated             Current Status:  Pt reports that it is better at about 50% better 12/19/22  2. Pt will have decreased pain at 2/10 or better with activity for carryover to performing increased ADLs with less discomfort for an improved quality of life              Eval Status:  Pain 4/10 at rest             Current Status: Continued 5/10 pain decreased to 4/10 after tx 12/19/22   3. Pt will demonstrate MMT left shoulder Flx/ABD at 4/10 for progress to performing more activity with less difficulty               Eval Status:  MMT Left shoulder Flx/ABD 2+             Current Status:  4. Pt will demonstrate AROM left shoulder Flx/ABD at 110* or better for carryover to more function with overhead activity. Eval Status:  AROM Left Shoulder Flx 85* ABD 62             Current Status: AROM Left Shoulder Flx 62* ABD 55 12/19/22  5.   Pt will improve FOTO score to 67 in 15 visits to show significant improvement for progress to good shoulder function of the Left UE             Eval Status: FOTO = 53             Current Status: Decreased to 41 12/16/22     PLAN  [x]  Upgrade activities as tolerated     [x]  Continue plan of care  []  Update interventions per flow sheet       []  Discharge due to:_  []  Other:_      Duane Keys, PT 12/19/2022  8:34 AM    Future Appointments   Date Time Provider Billie Turcios   12/19/2022  8:45 AM John Counter, PT [facility]   12/23/2022  9:30 AM John Counter, PT [facility]

## 2022-12-21 ENCOUNTER — HOSPITAL ENCOUNTER (OUTPATIENT)
Dept: PHYSICAL THERAPY | Age: 62
Discharge: HOME | End: 2022-12-21
Attending: ORTHOPAEDIC SURGERY
Payer: COMMERCIAL

## 2022-12-21 PROCEDURE — 97110 THERAPEUTIC EXERCISES: CPT

## 2022-12-21 PROCEDURE — 97530 THERAPEUTIC ACTIVITIES: CPT

## 2022-12-21 NOTE — PROGRESS NOTES
[facility] at [address]  [address]  Phone: [phone]      Fax:  [phone]    Progress Note  Patient name: Veronica Paredes. Start of Care: 2022   Referral source: Serina Jerry MD : 1960               Medical Diagnosis: Pain in left shoulder [M25.512]  Strain of muscle(s) and tendon(s) of the rotator cuff of left shoulder, subsequent encounter [S46.012D]  Payor: [facility] [employer] / Plan: [address] / Product Type: PPO /  Onset Date: Aug 2021               Treatment Diagnosis: Left Shoulder Pain   Prior Hospitalization: see medical history Provider#: [id number]   Medications: Verified on Patient summary List   Comorbidities: OA, HTN  Prior Level of Function: Shoulder pain and discomfort prior to and after surgery     Visits from Start of Care: 6    Missed Visits: 1    Established Goals:       Short Term Goals: To be accomplished in 5 treatments:  1. Pt will be compliant and independent with HEP in order to facilitate PT sessions and aid with self management             Eval Status:  Initiated             Current Status: Pt reports compliance with HEP   2.  Pt to tolerate 30 min or more of TE and/or Interventions w/o increased s/s             Eval Status:  Initiated             Current Status: Pt tolerate 66 min of treatment with some discomfort during exercises      Long Term Goals: To be accomplished in 10 treatments:  1. Pt will report 50% improvement or better with Left Shoulder dysfunction to show a significant increase in ability to tolerate ADL             Eval Status:  Initiated             Current Status:  Pt reports that it is better at about 50% better   2.   Pt will have decreased pain at 2/10 or better with activity for carryover to performing increased ADLs with less discomfort for an improved quality of life              Eval Status:  Pain 4/10 at rest             Current Status: Continued 5/10 pain decreased to 4/10 after tx    3. Pt will demonstrate MMT left shoulder Flx/ABD at 4/10 for progress to performing more activity with less difficulty               Eval Status:  MMT Left shoulder Flx/ABD 2+             Current Status: Progressing Flx 3-/5 and Abd 2+/5  4. Pt will demonstrate AROM left shoulder Flx/ABD at 110* or better for carryover to more function with overhead activity. Eval Status:  AROM Left Shoulder Flx 85* ABD 62             Current Status: AROM Left Shoulder Flx 62* ABD 55   5. Pt will improve FOTO score to 67 in 15 visits to show significant improvement for progress to good shoulder function of the Left UE             Eval Status: FOTO = 53             Current Status: Decreased to 41         Key Functional Changes: Patient has shown good progress with this treatment program. Pain as of last visit was 5/10. Patient has shown decreased pain and increased strength and mobility. Patient is showing fairly good PROM with reported pain. AROM is significantly limited by pain and strength. NMES was initiated at the left infraspinatus and anterior deltoid to aid with pain and strength gains. Patient reports 50% improvement with overall involvement. FOTO score is 41. Updated Goals: to be achieved in 10 treatments:  1..  Pt to tolerate 30 min or more of TE and/or Interventions w/o increased s/s             PN Status:  Pt tolerate 66 min of treatment with some discomfort during exercises              Current Status:   2. Pt will report 75% improvement or better with Left Shoulder dysfunction to show a significant increase in ability to tolerate ADL             PN Status: Pt reports that it is better at about 50% better              Current Status:    3.   Pt will have decreased pain at 2/10 or better with activity for carryover to performing increased ADLs with less discomfort for an improved quality of life              PN Status:  Continued 5/10 pain decreased to 4/10 after tx               Current Status:   4. Pt will demonstrate MMT left shoulder Flx/ABD at 4/10 for progress to performing more activity with less difficulty               PN Status: Progressing Flx 3-/5 and Abd 2+/5             Current Status:  5. Pt will demonstrate AROM left shoulder Flx/ABD at 110* or better for carryover to more function with overhead activity. PN Status:  AROM Left Shoulder Flx 62* ABD 55              Current Status:   6. Pt will improve FOTO score to 67 in 15 visits to show significant improvement for progress to good shoulder function of the Left UE             PN Status: Decreased to 41              Current Status:      ASSESSMENT/RECOMMENDATIONS:  [x]Continue therapy per initial plan/protocol at a frequency of  2 x per week for 10 visits  []Continue therapy with the following recommended changes:_____________________      _____________________________________________________________________  []Discontinue therapy progressing towards or have reached established goals  []Discontinue therapy due to lack of appreciable progress towards goals  []Discontinue therapy due to lack of attendance or compliance  []Await Physician's recommendations/decisions regarding therapy  []Other:________________________________________________________________    Thank you for this referral.   Kendra Nj, PTA 12/21/2022 4:31 PM  Gennaro Goodson, PT 12/22/2022   NOTE TO PHYSICIAN:  PLEASE COMPLETE THE ORDERS BELOW AND   FAX TO [facility]: [phone]  If you are unable to process this request in 24 hours please contact our office:   [phone]  []  I have read the above report and request that my patient continue as recommended.   []  I have read the above report and request that my patient continue therapy with the following changes/special instructions:________________________________________  []I have read the above report and request that my patient be discharged from therapy.     Physician's Signature:____________Date:_________TIME:________     Noé Lewis MD  ** Signature, Date and Time must be completed for valid certification **

## 2022-12-21 NOTE — PROGRESS NOTES
PT DAILY TREATMENT NOTE     Patient Name: Barbara Robins.   Date:2022  : 1960  [x]  Patient  Verified  Payor: [employer] / Plan: [facility] [address] / Product Type: PPO /    In time:417  Out time:505  Total Treatment Time (min): 48  Visit #: 7 of 10      Treatment Area: Pain in left shoulder [M25.512]  Strain of muscle(s) and tendon(s) of the rotator cuff of left shoulder, subsequent encounter [S46.012D]    SUBJECTIVE  Pain Level (0-10 scale): 5/10  Any medication changes, allergies to medications, adverse drug reactions, diagnosis change, or new procedure performed?: [x] No    [] Yes (see summary sheet for update)  Subjective functional status/changes:   [] No changes reported  Continues to report 5/10 pain     OBJECTIVE           Modality rationale: decrease inflammation, decrease pain, and increase tissue extensibility to improve the patients ability to improve activity tolerance    Min Type Additional Details   10 [x] Estim:  [x]Unatt       []IFC  []Premod                        [x]Other: [place] []w/ice   []w/heat  Position: Supine  Location: Left shoulder anterior deltoid and infraspinatus     [] Estim: []Att    []TENS instruct  []NMES                    []Other:  []w/US   []w/ice   []w/heat  Position:  Location:     []  Traction: [] Cervical       []Lumbar                       [] Prone          []Supine                       []Intermittent   []Continuous Lbs:  [] before manual  [] after manual     []  Ultrasound: []Continuous   [] Pulsed                           []1MHz   []3MHz W/cm2:  Location:     []  Iontophoresis with dexamethasone         Location: [] Take home patch   [] In clinic     []  Ice     []  heat  []  Ice massage  []  Laser   []  Anodyne Position:  Location:     []  Laser with stim  []  Other:  Position:  Location:     []  Vasopneumatic Device    []  Right     []  Left  Pre-treatment girth:  Post-treatment girth:  Measured at (location):  Pressure:       [] lo [] med [] hi   Temperature: [] lo [] med [] hi   [x] Skin assessment post-treatment:  [x]intact []redness- no adverse reaction    []redness - adverse reaction      23 min Therapeutic Exercise:  [x] See flow sheet :   Rationale: increase ROM and increase strength to improve the patients ability to perform ADLs    15 min Therapeutic Activity:  [x]  See flow sheet :   Rationale: increase ROM, increase strength, and improve coordination  to improve the patients ability to perform ADLs          With   [x] TE   [] TA   [] neuro   [] other: Patient Education: [x] Review HEP    [] Progressed/Changed HEP based on:   [] positioning   [] body mechanics   [] transfers   [] heat/ice application    [] other:      Other Objective/Functional Measures:   Left shoulder Flx 3-/5 and Abd 2+/5     Pain Level (0-10 scale) post treatment: 4    ASSESSMENT/Changes in Function: Patient has shown good progress with this treatment program. Pain as of last visit was 5/10. Patient has shown decreased pain and increased strength and mobility. Patient reports 50% improvement with overall involvement. FOTO score is 41. Patient will continue to benefit from skilled PT services to modify and progress therapeutic interventions, address functional mobility deficits, address ROM deficits, address strength deficits, analyze and address soft tissue restrictions, analyze and cue movement patterns, and analyze and modify body mechanics/ergonomics to attain remaining goals. []  See Plan of Care  []  See progress note/recertification  []  See Discharge Summary         Progress towards goals / Updated goals:  Short Term Goals: To be accomplished in 5 treatments:  1.   Pt will be compliant and independent with HEP in order to facilitate PT sessions and aid with self management             Eval Status:  Initiated             Current Status: Pt reports compliance with HEP   2.  Pt to tolerate 30 min or more of TE and/or Interventions w/o increased s/s             Eval Status:  Initiated             Current Status: Pt tolerate 66 min of treatment with some discomfort during exercises      Long Term Goals: To be accomplished in 10 treatments:  1. Pt will report 50% improvement or better with Left Shoulder dysfunction to show a significant increase in ability to tolerate ADL             Eval Status:  Initiated             Current Status:  Pt reports that it is better at about 50% better   2. Pt will have decreased pain at 2/10 or better with activity for carryover to performing increased ADLs with less discomfort for an improved quality of life              Eval Status:  Pain 4/10 at rest             Current Status: Continued 5/10 pain decreased to 4/10 after tx  12/21/22  3. Pt will demonstrate MMT left shoulder Flx/ABD at 4/10 for progress to performing more activity with less difficulty               Eval Status:  MMT Left shoulder Flx/ABD 2+             Current Status: Progressing Flx 3-/5 and Abd 2+/5 12/21/22  4. Pt will demonstrate AROM left shoulder Flx/ABD at 110* or better for carryover to more function with overhead activity. Eval Status:  AROM Left Shoulder Flx 85* ABD 62             Current Status: AROM Left Shoulder Flx 62* ABD 55   5. Pt will improve FOTO score to 67 in 15 visits to show significant improvement for progress to good shoulder function of the Left UE             Eval Status: FOTO = 53             Current Status: Decreased to 41      PLAN  [x]  Upgrade activities as tolerated     [x]  Continue plan of care  []  Update interventions per flow sheet       []  Discharge due to:_  []  Other:_      Nan Oswald PTA 12/21/2022  4:29 PM    No future appointments.

## 2022-12-23 ENCOUNTER — APPOINTMENT (OUTPATIENT)
Dept: PHYSICAL THERAPY | Age: 62
End: 2022-12-23
Attending: ORTHOPAEDIC SURGERY
Payer: COMMERCIAL

## 2022-12-26 ENCOUNTER — APPOINTMENT (OUTPATIENT)
Dept: PHYSICAL THERAPY | Age: 62
End: 2022-12-26
Attending: ORTHOPAEDIC SURGERY
Payer: COMMERCIAL

## 2023-01-06 ENCOUNTER — HOSPITAL ENCOUNTER (OUTPATIENT)
Dept: PHYSICAL THERAPY | Age: 63
Discharge: HOME | End: 2023-01-06
Attending: ORTHOPAEDIC SURGERY
Payer: COMMERCIAL

## 2023-01-06 PROCEDURE — 97110 THERAPEUTIC EXERCISES: CPT

## 2023-01-06 PROCEDURE — 97140 MANUAL THERAPY 1/> REGIONS: CPT

## 2023-01-06 PROCEDURE — 97035 APP MDLTY 1+ULTRASOUND EA 15: CPT

## 2023-01-06 NOTE — PROGRESS NOTES
PT DAILY TREATMENT NOTE     Patient Name: Celestino Rajput.   Date:2023  : 1960  [x]  Patient  Verified  Payor: [facility] HEALTH / Plan: [facility] [address] / Product Type: PPO /    In time:930  Out time:  Total Treatment Time (min): 55  Visit #: 1 of 10    Medicare/BCBS Only   Total Timed Codes (min):  55 1:1 Treatment Time:  55       Treatment Area: Pain in left shoulder [M25.512]  Strain of muscle(s) and tendon(s) of the rotator cuff of left shoulder, subsequent encounter [S46.012D]    SUBJECTIVE  Pain Level (0-10 scale): 4  Any medication changes, allergies to medications, adverse drug reactions, diagnosis change, or new procedure performed?: [x] No    [] Yes (see summary sheet for update)  Subjective functional status/changes:   [] No changes reported  Feeling a little better today    OBJECTIVE    Modality rationale: decrease inflammation, decrease pain, and increase tissue extensibility to improve the patients ability to improve activity tolerance   Min Type Additional Details    [] Estim:  []Unatt       []IFC  []Premod                        []Other:  []w/ice   []w/heat  Position:  Location:    [] Estim: []Att    []TENS instruct  []NMES                    []Other:  []w/US   []w/ice   []w/heat  Position:  Location:    []  Traction: [] Cervical       []Lumbar                       [] Prone          []Supine                       []Intermittent   []Continuous Lbs:  [] before manual  [] after manual   8 [x]  Ultrasound: [x]Continuous   [] Pulsed                           []1MHz   [x]3MHz W/cm2: 1.3  Location: Left anterior deltoid    []  Iontophoresis with dexamethasone         Location: [] Take home patch   [] In clinic    []  Ice     []  heat  []  Ice massage  []  Laser   []  Anodyne Position:  Location:    []  Laser with stim  []  Other:  Position:  Location:    []  Vasopneumatic Device    []  Right     []  Left  Pre-treatment girth:  Post-treatment girth:  Measured at (location): Pressure:       [] lo [] med [] hi   Temperature: [] lo [] med [] hi   [x] Skin assessment post-treatment:  [x]intact []redness- no adverse reaction    []redness - adverse reaction:     39 min Therapeutic Exercise:  [x] See flow sheet :   Rationale: increase ROM, increase strength, and improve coordination to improve the patients ability to improve tolerance to activity    8 min Manual Therapy:  STM/DTM/Effleurage left anterior deltoid, KT I-Strip for spacing at the left anterior deltoid     The manual therapy interventions were performed at a separate and distinct time from the therapeutic activities interventions. Rationale: decrease pain, increase ROM, increase tissue extensibility, and decrease trigger points to improve tolerance to activity            With   [x] TE   [] TA   [] neuro   [] other: Patient Education: [x] Review HEP    [] Progressed/Changed HEP based on:   [] positioning   [] body mechanics   [] transfers   [] heat/ice application    [] other:      Other Objective/Functional Measures:   - AROM Right shoulder Flx 73 progressing to 83 with VC's and TC's  - AAROM with finger ladder to 90*  - Pain at the left anterior deltoid     Pain Level (0-10 scale) post treatment: 2    ASSESSMENT/Changes in Function:   - Good response to treatment with less discomfort reported after use of MT followed by US and KT    Patient will continue to benefit from skilled PT services to modify and progress therapeutic interventions, address functional mobility deficits, address ROM deficits, address strength deficits, analyze and address soft tissue restrictions, and analyze and cue movement patterns to attain remaining goals.      []  See Plan of Care  []  See progress note/recertification  []  See Discharge Summary         Progress towards goals / Updated goals:  Updated Goals: to be achieved in 10 treatments:  1..  Pt to tolerate 30 min or more of TE and/or Interventions w/o increased s/s             PN Status:  Pt tolerate 66 min of treatment with some discomfort during exercises              Current Status:   2. Pt will report 75% improvement or better with Left Shoulder dysfunction to show a significant increase in ability to tolerate ADL             PN Status: Pt reports that it is better at about 50% better              Current Status:    3. Pt will have decreased pain at 2/10 or better with activity for carryover to performing increased ADLs with less discomfort for an improved quality of life              PN Status:  Continued 5/10 pain decreased to 4/10 after tx               Current Status: Progressing with pain 4/10 today 1/6/23  4. Pt will demonstrate MMT left shoulder Flx/ABD at 4/10 for progress to performing more activity with less difficulty               PN Status: Progressing Flx 3-/5 and Abd 2+/5             Current Status:  5. Pt will demonstrate AROM left shoulder Flx/ABD at 110* or better for carryover to more function with overhead activity. PN Status:  AROM Left Shoulder Flx 62* ABD 55              Current Status: Progressing at Flx 73 for today ABD not measured 1/6/23  6. Pt will improve FOTO score to 67 in 15 visits to show significant improvement for progress to good shoulder function of the Left UE             PN Status: Decreased to 41              Current Status:    PLAN  [x]  Upgrade activities as tolerated     [x]  Continue plan of care  []  Update interventions per flow sheet       []  Discharge due to:_  []  Other:_      Kaylie Stevens, PT 1/6/2023  9:39 AM    No future appointments.

## 2023-01-13 ENCOUNTER — HOSPITAL ENCOUNTER (OUTPATIENT)
Dept: PHYSICAL THERAPY | Age: 63
Discharge: HOME | End: 2023-01-13
Attending: ORTHOPAEDIC SURGERY
Payer: COMMERCIAL

## 2023-01-13 PROCEDURE — 97035 APP MDLTY 1+ULTRASOUND EA 15: CPT

## 2023-01-13 PROCEDURE — 97140 MANUAL THERAPY 1/> REGIONS: CPT

## 2023-01-13 PROCEDURE — 97110 THERAPEUTIC EXERCISES: CPT

## 2023-01-13 NOTE — PROGRESS NOTES
PT DAILY TREATMENT NOTE     Patient Name: Meredith Fraser.   Date:2023  : 1960  [x]  Patient  Verified  Payor: [facility] HEALTH / Plan: [facility] [address] / Product Type: PPO /    In time:930  Out time:103  Total Treatment Time (min): 69  Visit #: 2 of 10    Medicare/BCBS Only   Total Timed Codes (min):  69 1:1 Treatment Time:  69       Treatment Area: Pain in left shoulder [M25.512]  Strain of muscle(s) and tendon(s) of the rotator cuff of left shoulder, subsequent encounter [S46.012D]    SUBJECTIVE  Pain Level (0-10 scale): 4  Any medication changes, allergies to medications, adverse drug reactions, diagnosis change, or new procedure performed?: [x] No    [] Yes (see summary sheet for update)  Subjective functional status/changes:   [] No changes reported  Feeling a little better today    OBJECTIVE    Modality rationale: decrease inflammation, decrease pain, and increase tissue extensibility to improve the patients ability to improve activity tolerance   Min Type Additional Details    [] Estim:  []Unatt       []IFC  []Premod                        []Other:  []w/ice   []w/heat  Position:  Location:    [] Estim: []Att    []TENS instruct  []NMES                    []Other:  []w/US   []w/ice   []w/heat  Position:  Location:    []  Traction: [] Cervical       []Lumbar                       [] Prone          []Supine                       []Intermittent   []Continuous Lbs:  [] before manual  [] after manual   8 [x]  Ultrasound: [x]Continuous   [] Pulsed                           []1MHz   [x]3MHz W/cm2: 1.3  Location: Left anterior deltoid    []  Iontophoresis with dexamethasone         Location: [] Take home patch   [] In clinic    []  Ice     []  heat  []  Ice massage  []  Laser   []  Anodyne Position:  Location:    []  Laser with stim  []  Other:  Position:  Location:    []  Vasopneumatic Device    []  Right     []  Left  Pre-treatment girth:  Post-treatment girth:  Measured at (location): Pressure:       [] lo [] med [] hi   Temperature: [] lo [] med [] hi   [x] Skin assessment post-treatment:  [x]intact []redness- no adverse reaction    []redness - adverse reaction:     43 min Therapeutic Exercise:  [x] See flow sheet :   Rationale: increase ROM, increase strength, and improve coordination to improve the patients ability to improve tolerance to activity    18 min Manual Therapy:  STM/DTM/Effleurage left anterior deltoid, KT I-Strip for spacing at the left anterior deltoid     The manual therapy interventions were performed at a separate and distinct time from the therapeutic activities interventions. Rationale: decrease pain, increase ROM, increase tissue extensibility, and decrease trigger points to improve tolerance to activity            With   [x] TE   [] TA   [] neuro   [] other: Patient Education: [x] Review HEP    [] Progressed/Changed HEP based on:   [] positioning   [] body mechanics   [] transfers   [] heat/ice application    [] other:      Other Objective/Functional Measures:   - AROM Left shoulder Flx  83 ABD 80*   - PROM Left Shoulder Flx 130 in standing  - AAROM with finger ladder to 106*  - Pain continues at the left anterior deltoid with muscle tightness     Pain Level (0-10 scale) post treatment: 2    ASSESSMENT/Changes in Function:   - Good response to treatment with less discomfort after use of MT followed by US and KYLER    Patient will continue to benefit from skilled PT services to modify and progress therapeutic interventions, address functional mobility deficits, address ROM deficits, address strength deficits, analyze and address soft tissue restrictions, and analyze and cue movement patterns to attain remaining goals.      []  See Plan of Care  []  See progress note/recertification  []  See Discharge Summary         Progress towards goals / Updated goals:  Updated Goals: to be achieved in 10 treatments:  1..  Pt to tolerate 30 min or more of TE and/or Interventions w/o increased s/s             PN Status:  Pt tolerate 66 min of treatment with some discomfort during exercises              Current Status:   2. Pt will report 75% improvement or better with Left Shoulder dysfunction to show a significant increase in ability to tolerate ADL             PN Status: Pt reports that it is better at about 50% better              Current Status:    3. Pt will have decreased pain at 2/10 or better with activity for carryover to performing increased ADLs with less discomfort for an improved quality of life              PN Status:  Continued 5/10 pain decreased to 4/10 after tx               Current Status: Progressing with pain 4/10 today 1/13/23  4. Pt will demonstrate MMT left shoulder Flx/ABD at 4/10 for progress to performing more activity with less difficulty               PN Status: Progressing Flx 3-/5 and Abd 2+/5             Current Status: (B) 3- 1/13/23  5. Pt will demonstrate AROM left shoulder Flx/ABD at 110* or better for carryover to more function with overhead activity. PN Status:  AROM Left Shoulder Flx 62* ABD 55              Current Status: Progressing at Flx 83 ABD 80 1/13/23  6.   Pt will improve FOTO score to 67 in 15 visits to show significant improvement for progress to good shoulder function of the Left UE             PN Status: Decreased to 41              Current Status:    PLAN  [x]  Upgrade activities as tolerated     [x]  Continue plan of care  []  Update interventions per flow sheet       []  Discharge due to:_  []  Other:_      Rufus Batista, PT 1/13/2023  10:06 AM    Future Appointments   Date Time Provider Billie Turcios   1/20/2023  9:30 AM Micah Mccormack, PT [facility]   1/27/2023  9:30 AM Micah Mccormack, PT [facility]   2/3/2023  9:30 AM Micah Mccormack, PT [facility]

## 2023-01-20 ENCOUNTER — HOSPITAL ENCOUNTER (OUTPATIENT)
Dept: PHYSICAL THERAPY | Age: 63
Discharge: HOME | End: 2023-01-20
Attending: ORTHOPAEDIC SURGERY
Payer: COMMERCIAL

## 2023-01-20 PROCEDURE — 97032 APPL MODALITY 1+ESTIM EA 15: CPT

## 2023-01-20 PROCEDURE — 97110 THERAPEUTIC EXERCISES: CPT

## 2023-01-20 PROCEDURE — 97140 MANUAL THERAPY 1/> REGIONS: CPT

## 2023-01-20 NOTE — PROGRESS NOTES
PT DAILY TREATMENT NOTE     Patient Name: Varghese Rowe. Date:2023  : 1960  [x]  Patient  Verified  Payor: [facility] HEALTH / Plan: [address] / Product Type: PPO /    In time:930  Out time:1030  Total Treatment Time (min): 60  Visit #: 3 of 10    Medicare/BCBS Only   Total Timed Codes (min):  60 1:1 Treatment Time:  60       Treatment Area: Pain in left shoulder [M25.512]  Strain of muscle(s) and tendon(s) of the rotator cuff of left shoulder, subsequent encounter [S46.012D]    SUBJECTIVE  Pain Level (0-10 scale): 4  Any medication changes, allergies to medications, adverse drug reactions, diagnosis change, or new procedure performed?: [x] No    [] Yes (see summary sheet for update)  Subjective functional status/changes:   [] No changes reported  Doing okay.   Not as painful    OBJECTIVE     decrease inflammation, decrease pain, and increase tissue extensibility to improve the patients ability to improve activity tolerance    Min Type Additional Details     [] Estim:  []Unatt       []IFC  []Premod                        []Other:  []w/ice   []w/heat  Position:  Location:    12 [x] Estim: [x]Att    []TENS instruct  []NMES                    [x]Other: Combo  [x]w/US   []w/ice   []w/heat  Position: Right SL  Location: Left Teres Minor/Infraspinatus/Post Delt/Ant Delt     []  Traction: [] Cervical       []Lumbar                       [] Prone          []Supine                       []Intermittent   []Continuous Lbs:  [] before manual  [] after manual     []  Ultrasound: []Continuous   [] Pulsed                           []1MHz   []3MHz W/cm2:   Location:      []  Iontophoresis with dexamethasone         Location: [] Take home patch   [] In clinic     []  Ice     []  heat  []  Ice massage  []  Laser   []  Anodyne Position:  Location:     []  Laser with stim  []  Other:  Position:  Location:     []  Vasopneumatic Device    []  Right     []  Left  Pre-treatment girth:  Post-treatment girth: Measured at (location):  Pressure:       [] lo [] med [] hi   Temperature: [] lo [] med [] hi   [x] Skin assessment post-treatment:  [x]intact []redness- no adverse reaction    []redness - adverse reaction:      33 min Therapeutic Exercise:  [x] See flow sheet :   Rationale: increase ROM, increase strength, and improve coordination to improve the patients ability to improve tolerance to activity     15 min Manual Therapy:  STM/DTM/Effleurage left anterior deltoid, STM/DTM/TPR Left Teres minor/Major/infraspinatus   The manual therapy interventions were performed at a separate and distinct time from the therapeutic activities interventions. Rationale: decrease pain, increase ROM, increase tissue extensibility, and decrease trigger points to improve tolerance to activity             With   [x] TE   [] TA   [] neuro   [] other: Patient Education: [x] Review HEP    [] Progressed/Changed HEP based on:   [] positioning   [] body mechanics   [] transfers   [] heat/ice application    [] other:      Other Objective/Functional Measures:   - AROM Left Shoulder Flx 98* with discomfort  - PROM Left Shoulder Flx 117 with pain noted at the Teres Minor region  - TTP with muscle tightness left Teres minor/Major  - - Pain in this area may limit shoulder elevation     Pain Level (0-10 scale) post treatment: 5    ASSESSMENT/Changes in Function:   - Slow but steady progress with left shoulder elevation  - Decreased muscle tightness noted at the anterior shoulder/deltoid with use of KT    Patient will continue to benefit from skilled PT services to modify and progress therapeutic interventions, address functional mobility deficits, address ROM deficits, address strength deficits, analyze and address soft tissue restrictions, and analyze and cue movement patterns to attain remaining goals.      []  See Plan of Care  []  See progress note/recertification  []  See Discharge Summary         Progress towards goals / Updated goals:  Updated Goals: to be achieved in 10 treatments:  1..  Pt to tolerate 30 min or more of TE and/or Interventions w/o increased s/s             PN Status:  Pt tolerate 66 min of treatment with some discomfort during exercises              Current Status:   2. Pt will report 75% improvement or better with Left Shoulder dysfunction to show a significant increase in ability to tolerate ADL             PN Status: Pt reports that it is better at about 50% better              Current Status:    3. Pt will have decreased pain at 2/10 or better with activity for carryover to performing increased ADLs with less discomfort for an improved quality of life              PN Status:  Continued 5/10 pain decreased to 4/10 after tx               Current Status: Progressing with pain 4/10 today 1/13/23  4. Pt will demonstrate MMT left shoulder Flx/ABD at 4/10 for progress to performing more activity with less difficulty               PN Status: Progressing Flx 3-/5 and Abd 2+/5             Current Status: (B) 3- 1/13/23  5. Pt will demonstrate AROM left shoulder Flx/ABD at 110* or better for carryover to more function with overhead activity. PN Status:  AROM Left Shoulder Flx 62* ABD 55              Current Status: Progressing at Flx 83 ABD 80 1/13/23  6.   Pt will improve FOTO score to 67 in 15 visits to show significant improvement for progress to good shoulder function of the Left UE             PN Status: Decreased to 41              Current Status:    PLAN  [x]  Upgrade activities as tolerated     [x]  Continue plan of care  []  Update interventions per flow sheet       []  Discharge due to:_  []  Other:_      Estefania Riggs PT 1/20/2023  10:01 AM    Future Appointments   Date Time Provider Billie Turcios   1/27/2023  9:30 AM Noble Estrada, PT [facility]   2/3/2023  9:30 AM Noble Estrada PT [facility]   3/1/2023  9:00 AM Eren Tatum MD [facility]

## 2023-01-23 ENCOUNTER — HOSPITAL ENCOUNTER (OUTPATIENT)
Dept: PHYSICAL THERAPY | Age: 63
Discharge: HOME | End: 2023-01-23
Attending: ORTHOPAEDIC SURGERY
Payer: COMMERCIAL

## 2023-01-23 PROCEDURE — 97110 THERAPEUTIC EXERCISES: CPT

## 2023-01-23 PROCEDURE — 97032 APPL MODALITY 1+ESTIM EA 15: CPT

## 2023-01-23 NOTE — PROGRESS NOTES
PT DAILY TREATMENT NOTE     Patient Name: Gearl Romberg. Date:2023  : 1960  [x]  Patient  Verified  Payor: [facility] HEALTH / Plan: [address] / Product Type: PPO /    In time:930  Out time:1024  Total Treatment Time (min): 56  Visit #: 4 of 10    Medicare/BCBS Only   Total Timed Codes (min):    1:1 Treatment Time:          Treatment Area: Pain in left shoulder [M25.512]  Strain of muscle(s) and tendon(s) of the rotator cuff of left shoulder, subsequent encounter [S46.012D]    SUBJECTIVE  Pain Level (0-10 scale): 5  Any medication changes, allergies to medications, adverse drug reactions, diagnosis change, or new procedure performed?: [x] No    [] Yes (see summary sheet for update)  Subjective functional status/changes:   [] No changes reported  Increased pain today, not sure why.     OBJECTIVE    Modality rationale: decrease inflammation, decrease pain, and increase tissue extensibility to improve the patients ability to improve activity tolerance   Min Type Additional Details    [] Estim:  []Unatt       []IFC  []Premod                        []Other:  []w/ice   []w/heat  Position:  Location:   10 [x] Estim: [x]Att    []TENS instruct  []NMES                    [x]Other:  [x]w/US   []w/ice   []w/heat  Position: Supine and Right SL  Location:    []  Traction: [] Cervical       []Lumbar                       [] Prone          []Supine                       []Intermittent   []Continuous Lbs:  [] before manual  [] after manual    []  Ultrasound: []Continuous   [] Pulsed                           []1MHz   []3MHz W/cm2:  Location:    []  Iontophoresis with dexamethasone         Location: [] Take home patch   [] In clinic    []  Ice     []  heat  []  Ice massage  []  Laser   []  Anodyne Position:  Location:    []  Laser with stim  []  Other:  Position:  Location:    []  Vasopneumatic Device    []  Right     []  Left  Pre-treatment girth:  Post-treatment girth:  Measured at (location): Pressure:       [] lo [] med [] hi   Temperature: [] lo [] med [] hi   [x] Skin assessment post-treatment:  [x]intact []redness- no adverse reaction    []redness - adverse reaction:       40 min Therapeutic Exercise:  [x] See flow sheet :   Rationale: increase ROM, increase strength, and improve coordination to improve the patients ability to improve activity tolerance            With   [x] TE   [] TA   [] neuro   [] other: Patient Education: [x] Review HEP    [] Progressed/Changed HEP based on:   [] positioning   [] body mechanics   [] transfers   [] heat/ice application    [] other:      Other Objective/Functional Measures:   - Stiff mobility today  - Add Ceiling punch with assistance needed to maintain left shoulder at 90* Flx  - Continued TTP with muscle tightness at the left anterior deltoid and Terres minor but at a lesser degree  - Increased reps per flow sheet     Pain Level (0-10 scale) post treatment: 3-4    ASSESSMENT/Changes in Function:   \- Good response to treatment with decreased karolina and tightness noted after treatment  - Good tolerance with increased reps with exercises      Patient will continue to benefit from skilled PT services to modify and progress therapeutic interventions, address functional mobility deficits, address ROM deficits, address strength deficits, analyze and address soft tissue restrictions, and analyze and cue movement patterns to attain remaining goals. []  See Plan of Care  []  See progress note/recertification  []  See Discharge Summary         Progress towards goals / Updated goals:  Updated Goals: to be achieved in 10 treatments:  1..  Pt to tolerate 30 min or more of TE and/or Interventions w/o increased s/s             PN Status:  Pt tolerate 66 min of treatment with some discomfort during exercises              Current Status:   2.   Pt will report 75% improvement or better with Left Shoulder dysfunction to show a significant increase in ability to tolerate ADL PN Status: Pt reports that it is better at about 50% better              Current Status:    3. Pt will have decreased pain at 2/10 or better with activity for carryover to performing increased ADLs with less discomfort for an improved quality of life              PN Status:  Continued 5/10 pain decreased to 4/10 after tx               Current Status: Progressing with pain 5/10 today 1/23/23  4. Pt will demonstrate MMT left shoulder Flx/ABD at 4/10 for progress to performing more activity with less difficulty               PN Status: Progressing Flx 3-/5 and Abd 2+/5             Current Status: (B) 3- 1/13/23  5. Pt will demonstrate AROM left shoulder Flx/ABD at 110* or better for carryover to more function with overhead activity. PN Status:  AROM Left Shoulder Flx 62* ABD 55              Current Status: Progressing at Flx 83 ABD 80 1/13/23  6.   Pt will improve FOTO score to 67 in 15 visits to show significant improvement for progress to good shoulder function of the Left UE             PN Status: Decreased to 41              Current Status:    PLAN  [x]  Upgrade activities as tolerated     [x]  Continue plan of care  []  Update interventions per flow sheet       []  Discharge due to:_  []  Other:_      Susan Norton, PT 1/23/2023  9:51 AM    Future Appointments   Date Time Provider Billie Turcios   1/27/2023  9:30 AM Evgeny Montes, PT [facility]   1/30/2023  9:00 AM Evgeny Montes PT [facility]   2/3/2023  9:30 AM Evgeny Montes PT [facility]   3/1/2023  9:00 AM Wilfrid Lucero MD [facility]

## 2023-01-27 ENCOUNTER — HOSPITAL ENCOUNTER (OUTPATIENT)
Dept: PHYSICAL THERAPY | Age: 63
Discharge: HOME | End: 2023-01-27
Attending: ORTHOPAEDIC SURGERY
Payer: COMMERCIAL

## 2023-01-27 PROCEDURE — 97110 THERAPEUTIC EXERCISES: CPT

## 2023-01-27 PROCEDURE — 97032 APPL MODALITY 1+ESTIM EA 15: CPT

## 2023-01-27 NOTE — PROGRESS NOTES
PT DAILY TREATMENT NOTE     Patient Name: Aixa Vázquez.   Date:2023  : 1960  [x]  Patient  Verified  Payor: [facility] HEALTH / Plan: [facility] / Product Type: PPO /    In time:930  Out time:1028  Total Treatment Time (min): 58  Visit #: 5 of 24    Medicare/BCBS Only   Total Timed Codes (min):    1:1 Treatment Time:          Treatment Area: Pain in left shoulder [M25.512]  Strain of muscle(s) and tendon(s) of the rotator cuff of left shoulder, subsequent encounter [S46.012D]    SUBJECTIVE  Pain Level (0-10 scale): 4  Any medication changes, allergies to medications, adverse drug reactions, diagnosis change, or new procedure performed?: [x] No    [] Yes (see summary sheet for update)  Subjective functional status/changes:   [] No changes reported  Stiff    OBJECTIVE    Modality rationale: decrease inflammation, decrease pain, and increase tissue extensibility to improve the patients ability to improve function   Min Type Additional Details    [] Estim:  []Unatt       []IFC  []Premod                        []Other:  []w/ice   []w/heat  Position:  Location:   10 [x] Estim: [x]Att    []TENS instruct  [x]NMES                    [x]Other: E-Stim US Combo []w/US   []w/ice   []w/heat  Position: Right SL  Location: Left Teres Minor/Anterior deltoid    []  Traction: [] Cervical       []Lumbar                       [] Prone          []Supine                       []Intermittent   []Continuous Lbs:  [] before manual  [] after manual     []  Ultrasound: []Continuous   [] Pulsed                           []1MHz   []3MHz W/cm2:   Location:     []  Iontophoresis with dexamethasone         Location: [] Take home patch   [] In clinic    []  Ice     []  heat  []  Ice massage  []  Laser   []  Anodyne Position:  Location:    []  Laser with stim  []  Other:  Position:  Location:    []  Vasopneumatic Device    []  Right     []  Left  Pre-treatment girth:  Post-treatment girth:  Measured at (location): Pressure:       [] lo [] med [] hi   Temperature: [] lo [] med [] hi   [x] Skin assessment post-treatment:  [x]intact []redness- no adverse reaction    []redness - adverse reaction:         48 min Therapeutic Exercise:  [x] See flow sheet :   Rationale: increase ROM, increase strength, and improve coordination to improve the patients ability to improve function           With   [x] TE   [] TA   [x] neuro   [] other: Patient Education: [x] Review HEP    [] Progressed/Changed HEP based on:   [] positioning   [] body mechanics   [] transfers   [] heat/ice application    [] other:      Other Objective/Functional Measures:   - AROM supine to 95*  - TTP with muscle tightness at the left anterior deltoid and teres minor region     Pain Level (0-10 scale) post treatment: 3    ASSESSMENT/Changes in Function:   - Pt demo improved ability to hold left UE at 90 in supine to perform ceiling punch  - Pt with better mobility during treatment session    Patient will continue to benefit from skilled PT services to modify and progress therapeutic interventions, address functional mobility deficits, address ROM deficits, address strength deficits, analyze and address soft tissue restrictions, and analyze and cue movement patterns to attain remaining goals. []  See Plan of Care  []  See progress note/recertification  []  See Discharge Summary         Progress towards goals / Updated goals:  Updated Goals: to be achieved in 10 treatments:  1..  Pt to tolerate 30 min or more of TE and/or Interventions w/o increased s/s             PN Status:  Pt tolerate 66 min of treatment with some discomfort during exercises              Current Status:   2. Pt will report 75% improvement or better with Left Shoulder dysfunction to show a significant increase in ability to tolerate ADL             PN Status: Pt reports that it is better at about 50% better              Current Status:    3.   Pt will have decreased pain at 2/10 or better with activity for carryover to performing increased ADLs with less discomfort for an improved quality of life              PN Status:  Continued 5/10 pain decreased to 4/10 after tx               Current Status: Progressing with pain 5/10 today 1/23/23  4. Pt will demonstrate MMT left shoulder Flx/ABD at 4/10 for progress to performing more activity with less difficulty               PN Status: Progressing Flx 3-/5 and Abd 2+/5             Current Status: (B) 3- 1/13/23  5. Pt will demonstrate AROM left shoulder Flx/ABD at 110* or better for carryover to more function with overhead activity. PN Status:  AROM Left Shoulder Flx 62* ABD 55              Current Status: Progressing at Flx 83 ABD 80 1/13/23  6.   Pt will improve FOTO score to 67 in 15 visits to show significant improvement for progress to good shoulder function of the Left UE             PN Status: Decreased to 41              Current Status:    PLAN  [x]  Upgrade activities as tolerated     [x]  Continue plan of care  []  Update interventions per flow sheet       []  Discharge due to:_  []  Other:_      Aric Villareal, PT 1/27/2023  10:09 AM    Future Appointments   Date Time Provider Billie Turcios   1/30/2023  9:00 AM Moreno Noe, PT [facility]   2/3/2023  9:30 AM Moreno Noe PT [facility]   3/1/2023  9:00 AM Meli Dorsey MD [facility]

## 2023-01-30 ENCOUNTER — HOSPITAL ENCOUNTER (OUTPATIENT)
Dept: PHYSICAL THERAPY | Age: 63
Discharge: HOME | End: 2023-01-30
Attending: ORTHOPAEDIC SURGERY
Payer: COMMERCIAL

## 2023-01-30 PROCEDURE — 97110 THERAPEUTIC EXERCISES: CPT

## 2023-01-30 PROCEDURE — 97032 APPL MODALITY 1+ESTIM EA 15: CPT

## 2023-01-30 NOTE — PROGRESS NOTES
PT DAILY TREATMENT NOTE     Patient Name: Cm Gold.   Date:2023  : 1960  [x]  Patient  Verified  Payor: [facility] HEALTH / Plan: [facility] [address] / Product Type: PPO /    In time:903  Out time:1007  Total Treatment Time (min): 63  Visit #: 6 of 24    Medicare/BCBS Only   Total Timed Codes (min):    1:1 Treatment Time:          Treatment Area: Pain in left shoulder [M25.512]  Strain of muscle(s) and tendon(s) of the rotator cuff of left shoulder, subsequent encounter [S46.012D]    SUBJECTIVE  Pain Level (0-10 scale): 4  Any medication changes, allergies to medications, adverse drug reactions, diagnosis change, or new procedure performed?: [x] No    [] Yes (see summary sheet for update)  Subjective functional status/changes:   [] No changes reported  Stiff    OBJECTIVE    Modality rationale: decrease inflammation, decrease pain, and increase tissue extensibility to improve the patients ability to improve function   Min Type Additional Details    [] Estim:  []Unatt       []IFC  []Premod                        []Other:  []w/ice   []w/heat  Position:  Location:   10 [x] Estim: [x]Att    []TENS instruct  [x]NMES                    [x]Other: E-Stim US Combo []w/US   []w/ice   []w/heat  Position: Right SL  Location: Left Teres Minor/Anterior deltoid    []  Traction: [] Cervical       []Lumbar                       [] Prone          []Supine                       []Intermittent   []Continuous Lbs:  [] before manual  [] after manual     []  Ultrasound: []Continuous   [] Pulsed                           []1MHz   []3MHz W/cm2:   Location:     []  Iontophoresis with dexamethasone         Location: [] Take home patch   [] In clinic    []  Ice     []  heat  []  Ice massage  []  Laser   []  Anodyne Position:  Location:    []  Laser with stim  []  Other:  Position:  Location:    []  Vasopneumatic Device    []  Right     []  Left  Pre-treatment girth:  Post-treatment girth:  Measured at (location): Pressure:       [] lo [] med [] hi   Temperature: [] lo [] med [] hi   [x] Skin assessment post-treatment:  [x]intact []redness- no adverse reaction    []redness - adverse reaction:         53 min Therapeutic Exercise:  [x] See flow sheet :   Rationale: increase ROM, increase strength, and improve coordination to improve the patients ability to improve function           With   [x] TE   [] TA   [x] neuro   [] other: Patient Education: [x] Review HEP    [] Progressed/Changed HEP based on:   [] positioning   [] body mechanics   [] transfers   [] heat/ice application    [] other:      Other Objective/Functional Measures:   - AROM supine continues to be about 95*  - TTP with muscle tightness at the left anterior deltoid and teres minor region  - - Slight decreased pain at the Teres minor with palpation     Pain Level (0-10 scale) post treatment: 5    ASSESSMENT/Changes in Function:   - Pt demo improved ability to hold left UE at 90 in supine to perform ceiling punch  - Pt with better mobility during treatment session with less discomfort toda4    Patient will continue to benefit from skilled PT services to modify and progress therapeutic interventions, address functional mobility deficits, address ROM deficits, address strength deficits, analyze and address soft tissue restrictions, and analyze and cue movement patterns to attain remaining goals. []  See Plan of Care  []  See progress note/recertification  []  See Discharge Summary         Progress towards goals / Updated goals:  Updated Goals: to be achieved in 10 treatments:  1..  Pt to tolerate 30 min or more of TE and/or Interventions w/o increased s/s             PN Status:  Pt tolerate 66 min of treatment with some discomfort during exercises              Current Status:   2.   Pt will report 75% improvement or better with Left Shoulder dysfunction to show a significant increase in ability to tolerate ADL             PN Status: Pt reports that it is better at about 50% better              Current Status:    3. Pt will have decreased pain at 2/10 or better with activity for carryover to performing increased ADLs with less discomfort for an improved quality of life              PN Status:  Continued 5/10 pain decreased to 4/10 after tx               Current Status: Progressing with pain 5/10 today 1/30/23  4. Pt will demonstrate MMT left shoulder Flx/ABD at 4/10 for progress to performing more activity with less difficulty               PN Status: Progressing Flx 3-/5 and Abd 2+/5             Current Status: Flx 3-3+ ABD 3- 1/30/23  5. Pt will demonstrate AROM left shoulder Flx/ABD at 110* or better for carryover to more function with overhead activity. PN Status:  AROM Left Shoulder Flx 62* ABD 55              Current Status: Progressing at Flx 83 ABD 80 1/13/23  6.   Pt will improve FOTO score to 67 in 15 visits to show significant improvement for progress to good shoulder function of the Left UE             PN Status: Decreased to 41              Current Status:    PLAN  [x]  Upgrade activities as tolerated     [x]  Continue plan of care  []  Update interventions per flow sheet       []  Discharge due to:_  []  Other:_      Antoni Perdue, PT 1/30/2023  10:08 AM    Future Appointments   Date Time Provider Billie Turcios   2/3/2023  9:30 AM Marian Vance, PT [place] [facility]

## 2023-01-31 NOTE — PROGRESS NOTES
Beverly Valencia. is a 66 y.o. male  presents for follow up. He has history of kidney stones. He states that it is mild pain no fever chills or SOB. No Known Allergies    Patient Active Problem List   Diagnosis Code    Advanced directives, counseling/discussion Z71.89    Elevated blood pressure R03.0    Pain of right hip joint M25.551     Past Medical History:   Diagnosis Date    Hypertension      Social History     Socioeconomic History    Marital status:      Spouse name: Not on file    Number of children: Not on file    Years of education: Not on file    Highest education level: Not on file   Tobacco Use    Smoking status: Never Smoker    Smokeless tobacco: Never Used   Substance and Sexual Activity    Alcohol use: Yes     Alcohol/week: 4.8 oz     Types: 8 Cans of beer per week     Comment: weekends    Drug use: No    Sexual activity: Yes     No family history on file. Review of Systems   Constitutional: Negative for chills, fever, malaise/fatigue and weight loss. Eyes: Negative for blurred vision. Respiratory: Negative for shortness of breath and wheezing. Cardiovascular: Negative for chest pain. Gastrointestinal: Negative for nausea and vomiting. Musculoskeletal: Positive for joint pain (right shoulder. ). Negative for myalgias. Skin: Negative for rash. Neurological: Negative for weakness. Vitals:    04/04/19 1416   BP: 132/80   Pulse: 86   Resp: 12   Temp: 97.9 °F (36.6 °C)   TempSrc: Oral   SpO2: 96%   Weight: 162 lb 6.4 oz (73.7 kg)   Height: 5' 6\" (1.676 m)       Physical Exam   Constitutional: He is oriented to person, place, and time and well-developed, well-nourished, and in no distress. Neck: Normal range of motion. Neck supple. No thyromegaly present. Cardiovascular: Normal rate, regular rhythm and normal heart sounds. Pulmonary/Chest: Effort normal and breath sounds normal.   Musculoskeletal: Normal range of motion.  He exhibits no edema or Spoke with patient , attempting to schedule patient for a procedure with Dr Arellano , patient reports she can not have procedure this week as her mother is going into the hospital, will clarify with Dr Arellano where he would like this procedure done. Patient expressed understanding   tenderness. Neurological: He is alert and oriented to person, place, and time. Gait normal.   Skin: Skin is warm and dry. Nursing note and vitals reviewed. Assessment/Plan      ICD-10-CM ICD-9-CM    1. Chronic right shoulder pain M25.511 719.41     G89.29 338.29    2. Flank pain R10.9 789.09 URIC ACID   3. Vitamin D deficiency E55.9 268.9 VITAMIN D, 25 HYDROXY     I have discussed the diagnosis with the patient and the intended plan of care as seen in the above orders. The patient has received an after-visit summary and questions were answered concerning future plans. I have discussed medication, side effects, and warnings with the patient in detail. The patient verbalized understanding and is in agreement with the plan of care. The patient will contact the office with any additional concerns.     lab results and schedule of future lab studies reviewed with patient    Norah Butcher MD

## 2023-02-07 ENCOUNTER — HOSPITAL ENCOUNTER (OUTPATIENT)
Dept: PHYSICAL THERAPY | Age: 63
Discharge: HOME | End: 2023-02-07
Attending: ORTHOPAEDIC SURGERY
Payer: COMMERCIAL

## 2023-02-07 PROCEDURE — 97140 MANUAL THERAPY 1/> REGIONS: CPT

## 2023-02-07 PROCEDURE — 97035 APP MDLTY 1+ULTRASOUND EA 15: CPT

## 2023-02-07 PROCEDURE — 97110 THERAPEUTIC EXERCISES: CPT

## 2023-02-07 NOTE — PROGRESS NOTES
PT DAILY TREATMENT NOTE     Patient Name: Meredith Fraser.   Date:2023  : 1960  [x]  Patient  Verified  Payor: [facility] HEALTH / Plan: [address] / Product Type: PPO /    In time:806  Out time:911  Total Treatment Time (min):  65  Visit #: 8 of 24    Medicare/BCBS Only   Total Timed Codes (min):    1:1 Treatment Time:          Treatment Area: Pain in left shoulder [M25.512]  Strain of muscle(s) and tendon(s) of the rotator cuff of left shoulder, subsequent encounter [S46.012D]    SUBJECTIVE  Pain Level (0-10 scale): 5  Any medication changes, allergies to medications, adverse drug reactions, diagnosis change, or new procedure performed?: [x] No    [] Yes (see summary sheet for update)  Subjective functional status/changes:   [] No changes reported  Stiff    OBJECTIVE    Modality rationale: decrease inflammation, decrease pain, and increase tissue extensibility to improve the patients ability to improve shoulder function   Min Type Additional Details    [] Estim:  []Unatt       []IFC  []Premod                        []Other:  []w/ice   []w/heat  Position:  Location:    [] Estim: []Att    []TENS instruct  []NMES                    []Other:  []w/US   []w/ice   []w/heat  Position:  Location:    []  Traction: [] Cervical       []Lumbar                       [] Prone          []Supine                       []Intermittent   []Continuous Lbs:  [] before manual  [] after manual   8 [x]  Ultrasound: []Continuous   [x] Pulsed 50%                           [x]1MHz   []3MHz Location: Left Teres Major/infraspinatus/Anterior Deltoid, Latissimus Dorsi  W/cm2:    []  Iontophoresis with dexamethasone         Location: [] Take home patch   [] In clinic    []  Ice     []  heat  []  Ice massage  []  Laser   []  Anodyne Position:  Location:    []  Laser with stim  []  Other: Position:  Location:    []  Vasopneumatic Device  Pre-treatment girth:  Post-treatment girth:  Measured at (location):  Pressure: [] lo [] med [] hi   Temperature: [] lo [] med [] hi   [x] Skin assessment post-treatment:  [x]intact []redness- no adverse reaction    []redness - adverse reaction:          30 min Therapeutic Exercise:  [x] See flow sheet :   Rationale: increase ROM, increase strength, and improve coordination to improve the patients ability to improve function    27 min Manual Therapy: STM/DTM/Effleurage Left Teres Major/Minor/Infraspinatus   The manual therapy interventions were performed at a separate and distinct time from the therapeutic activities interventions. Rationale: decrease pain, increase ROM, increase tissue extensibility, and decrease trigger points to improve activity tolerance            With   [x] TE   [] TA   [x] neuro   [] other: Patient Education: [x] Review HEP    [] Progressed/Changed HEP based on:   [] positioning   [] body mechanics   [] transfers   [] heat/ice application    [] other:      Other Objective/Functional Measures:   - Increased TTP and muscle tightness at the left anterior deltoid/Teres Major/Latissimus Dorsi  - Add ER Str  - MMT Left IR 4+/5 ER 3+/5  - Add T-Band/SL ER  - Finger ladder to 17 today    Pain Level (0-10 scale) post treatment: 5    ASSESSMENT/Changes in Function:   - Good tolerance with added exercises today  - Decreased muscle tightness at the left Teres major and latissimus dorsi after MT    Patient will continue to benefit from skilled PT services to modify and progress therapeutic interventions, address functional mobility deficits, address ROM deficits, address strength deficits, analyze and address soft tissue restrictions, and analyze and cue movement patterns to attain remaining goals.      []  See Plan of Care  []  See progress note/recertification  []  See Discharge Summary         Progress towards goals / Updated goals:  Updated Goals: to be achieved in 10 treatments:  1..  Pt to tolerate 30 min or more of TE and/or Interventions w/o increased s/s             PN Status:  Pt tolerate 66 min of treatment with some discomfort during exercises              Current Status: Little tolerance to treatment today with increased pain 2/3/23  2. Pt will report 75% improvement or better with Left Shoulder dysfunction to show a significant increase in ability to tolerate ADL             PN Status: Pt reports that it is better at about 50% better              Current Status:    3. Pt will have decreased pain at 2/10 or better with activity for carryover to performing increased ADLs with less discomfort for an improved quality of life              PN Status:  Continued 5/10 pain decreased to 4/10 after tx               Current Status: Progressing with pain 4/10 today 2/7/23  4. Pt will demonstrate MMT left shoulder Flx/ABD at 4/10 for progress to performing more activity with less difficulty               PN Status: Progressing Flx 3-/5 and Abd 2+/5             Current Status: Flx 3-3+ ABD 3- 1/30/23  5. Pt will demonstrate AROM left shoulder Flx/ABD at 110* or better for carryover to more function with overhead activity. PN Status:  AROM Left Shoulder Flx 62* ABD 55              Current Status: Significant decline in mobility today as noted above 2/3/23  6. Pt will improve FOTO score to 67 in 15 visits to show significant improvement for progress to good shoulder function of the Left UE             PN Status: Decreased to 41              Current Status:    PLAN  [x]  Upgrade activities as tolerated     [x]  Continue plan of care  []  Update interventions per flow sheet       []  Discharge due to:_  []  Other:_      Geo Peñaloza, PT 2/7/2023  8:16 AM    No future appointments.

## 2023-02-09 ENCOUNTER — HOSPITAL ENCOUNTER (OUTPATIENT)
Dept: PHYSICAL THERAPY | Age: 63
Discharge: HOME | End: 2023-02-09
Attending: ORTHOPAEDIC SURGERY
Payer: COMMERCIAL

## 2023-02-09 PROCEDURE — 97140 MANUAL THERAPY 1/> REGIONS: CPT

## 2023-02-09 PROCEDURE — 97110 THERAPEUTIC EXERCISES: CPT

## 2023-02-09 PROCEDURE — 97112 NEUROMUSCULAR REEDUCATION: CPT

## 2023-02-09 PROCEDURE — 97035 APP MDLTY 1+ULTRASOUND EA 15: CPT

## 2023-02-09 NOTE — PROGRESS NOTES
PT DAILY TREATMENT NOTE     Patient Name: Dannielle Cordon   Date:2023  : 1960  [x]  Patient  Verified  Payor: [email or web address] HEALTH / Plan: [facility] [address] / Product Type: PPO /    In time:1:00  Out time: 1:59  Total Treatment Time (min):  58  Visit #: 9 of 24    Medicare/BCBS Only   Total Timed Codes (min):    1:1 Treatment Time:          Treatment Area: Pain in left shoulder [M25.512]  Strain of muscle(s) and tendon(s) of the rotator cuff of left shoulder, subsequent encounter [S46.012D]    SUBJECTIVE  Pain Level (0-10 scale): 3  Any medication changes, allergies to medications, adverse drug reactions, diagnosis change, or new procedure performed?: [x] No    [] Yes (see summary sheet for update)  Subjective functional status/changes:   [] No changes reported  Doing a little better today    OBJECTIVE    Modality rationale: decrease inflammation, decrease pain, and increase tissue extensibility to improve the patients ability to improve shoulder function   Min Type Additional Details    [] Estim:  []Unatt       []IFC  []Premod                        []Other:  []w/ice   []w/heat  Position:  Location:    [] Estim: []Att    []TENS instruct  []NMES                    []Other:  []w/US   []w/ice   []w/heat  Position:  Location:    []  Traction: [] Cervical       []Lumbar                       [] Prone          []Supine                       []Intermittent   []Continuous Lbs:  [] before manual  [] after manual   8 [x]  Ultrasound: []Continuous   [x] Pulsed 50%                           [x]1MHz   []3MHz Location: Left Teres Major/infraspinatus/Anterior Deltoid, Latissimus Dorsi  W/cm2: 1.7    []  Iontophoresis with dexamethasone         Location: [] Take home patch   [] In clinic    []  Ice     []  heat  []  Ice massage  []  Laser   []  Anodyne Position:  Location:    []  Laser with stim  []  Other: Position:  Location:    []  Vasopneumatic Device  Pre-treatment girth:  Post-treatment girth:  Measured at (location):  Pressure:       [] lo [] med [] hi   Temperature: [] lo [] med [] hi   [x] Skin assessment post-treatment:  [x]intact []redness- no adverse reaction    []redness - adverse reaction:          30 min Therapeutic Exercise:  [x] See flow sheet :   Rationale: increase ROM, increase strength, and improve coordination to improve the patients ability to improve function    8 min Neuromuscular Re-education:  [x]  See flow sheet :   Rationale: increase ROM, increase strength, and improve coordination  to improve the patients ability to improve tolerance to activity       12 min Manual Therapy: STM/DTM/Effleurage Left Teres Major/Minor   The manual therapy interventions were performed at a separate and distinct time from the therapeutic activities interventions. Rationale: decrease pain, increase ROM, increase tissue extensibility, and decrease trigger points to improve activity tolerance            With   [x] TE   [] TA   [x] neuro   [] other: Patient Education: [x] Review HEP    [] Progressed/Changed HEP based on:   [] positioning   [] body mechanics   [] transfers   [] heat/ice application    [] other:      Other Objective/Functional Measures:   Continued TTP at the left Teres Major/Latissimus Dorsi/        Pain Level (0-10 scale) post treatment: 3    ASSESSMENT/Changes in Function:   - Improved tolerance with exercises today  - Less stiffness and better mobility with shoulder and incline press     Patient will continue to benefit from skilled PT services to modify and progress therapeutic interventions, address functional mobility deficits, address ROM deficits, address strength deficits, analyze and address soft tissue restrictions, and analyze and cue movement patterns to attain remaining goals.      []  See Plan of Care  []  See progress note/recertification  []  See Discharge Summary         Progress towards goals / Updated goals:  Updated Goals: to be achieved in 10 treatments:  1..  Pt to tolerate 30 min or more of TE and/or Interventions w/o increased s/s             PN Status:  Pt tolerate 66 min of treatment with some discomfort during exercises              Current Status: Little tolerance to treatment today with increased pain 2/3/23  2. Pt will report 75% improvement or better with Left Shoulder dysfunction to show a significant increase in ability to tolerate ADL             PN Status: Pt reports that it is better at about 50% better              Current Status:    3. Pt will have decreased pain at 2/10 or better with activity for carryover to performing increased ADLs with less discomfort for an improved quality of life              PN Status:  Continued 5/10 pain decreased to 4/10 after tx               Current Status: Progressing with pain 3/10 today 2/7/23  4. Pt will demonstrate MMT left shoulder Flx/ABD at 4/10 for progress to performing more activity with less difficulty               PN Status: Progressing Flx 3-/5 and Abd 2+/5             Current Status: Flx 3-3+ ABD 3- 1/30/23  5. Pt will demonstrate AROM left shoulder Flx/ABD at 110* or better for carryover to more function with overhead activity. PN Status:  AROM Left Shoulder Flx 62* ABD 55              Current Status: Significant decline in mobility today as noted above 2/3/23  6.   Pt will improve FOTO score to 67 in 15 visits to show significant improvement for progress to good shoulder function of the Left UE             PN Status: Decreased to 41              Current Status:    PLAN  [x]  Upgrade activities as tolerated     [x]  Continue plan of care  []  Update interventions per flow sheet       []  Discharge due to:_  []  Other:_      Rhona Win, PT 2/9/2023  1:26 PM    Future Appointments   Date Time Provider Billie Turcios   2/9/2023  1:00 PM Steven Valladares, PT [place] [facility]

## 2023-02-10 ENCOUNTER — APPOINTMENT (OUTPATIENT)
Dept: PHYSICAL THERAPY | Age: 63
End: 2023-02-10
Attending: ORTHOPAEDIC SURGERY
Payer: COMMERCIAL

## 2023-02-13 ENCOUNTER — HOSPITAL ENCOUNTER (OUTPATIENT)
Facility: HOSPITAL | Age: 63
Setting detail: RECURRING SERIES
Discharge: HOME | End: 2023-02-16
Payer: COMMERCIAL

## 2023-02-13 PROCEDURE — 97110 THERAPEUTIC EXERCISES: CPT

## 2023-02-13 PROCEDURE — 97140 MANUAL THERAPY 1/> REGIONS: CPT

## 2023-02-13 PROCEDURE — 97035 APP MDLTY 1+ULTRASOUND EA 15: CPT

## 2023-02-13 NOTE — PROGRESS NOTES
PHYSICAL / OCCUPATIONAL THERAPY - DAILY TREATMENT NOTE (updated )    Patient Name: Parker Bueno. Date: 2023    : 1960  Insurance: Payor: Mika First / Plan: Mika Fernandez / Product Type: *No Product type* /      Patient  verified yes     Visit #   Current / Total 10 24   Time   In / Out 0935 1046   Pain   In / Out 5 4   Subjective Functional Status/Changes: Stiff!!   Changes to:  Meds, Allergies, Med Hx, Sx Hx? If yes, update Summary List no       TREATMENT AREA =  Left shoulder pain [M25.512]    OBJECTIVE    Modalities Rationale:     decrease edema, decrease inflammation, decrease pain, and increase tissue extensibility to improve patient's ability to progress to PLOF and address remaining functional goals. min [] Estim Unattended, type/location:                                      []  w/ice    []  w/heat    min [] Estim Attended, type/location:                                     []  w/US     []  w/ice    []  w/heat    []  TENS insruct      min []  Mechanical Traction: type/lbs                   []  pro   []  sup   []  int   []  cont    []  before manual    []  after manual   8 min [x]  Ultrasound, settings/location:  1Mhz. Pulsed @ 50% 1.7 W/cm2     min []  Iontophoresis w/ dexamethasone, location:                                               []  take home patch       []  in clinic    min  unbilled []  Ice     []  Heat    location/position:     min []  Paraffin,  details:     min []  Vasopneumatic Device, press/temp:     min []  Ramonita Prashant / Eliot Valle: If using vaso (only need to measure limb vaso being performed on)      pre-treatment girth :       post-treatment girth :       measured at (landmark location) :      min []  Other:    Skin assessment post-treatment (if applicable):    [x]  intact    []  redness- no adverse reaction                 []redness - adverse reaction:        Therapeutic Procedures:   Tx Min Billable or 1:1 Min (if diff from [place]) Procedure, Rationale, Specifics    18 [id number] Therapeutic Exercise (timed):  increase ROM, strength, coordination, balance, and proprioception to improve patient's ability to progress to PLOF and address remaining functional goals. (see flow sheet as applicable)     Details if applicable:       12 12 05575 Manual Therapy (timed):  decrease pain, increase ROM, increase tissue extensibility, and decrease trigger points to improve patient's ability to progress to PLOF and address remaining functional goals. The manual therapy interventions were performed at a separate and distinct time from the therapeutic activities interventions . (see flow sheet as applicable)     Details if applicable:  STM/DTM/TPR Left Latissimus Dorsi/Teres major/minor/Anterior Deltoid                   40 38 [facility] Totals Reminder: bill using total billable min of TIMED therapeutic procedures (example: do not include dry needle or estim unattended, both untimed codes, in totals to left)  8-22 min = 1 unit; 23-37 min = 2 units; 38-52 min = 3 units; 53-67 min = 4 units; 68-82 min = 5 units   Total Total     [x]  Patient Education billed concurrently with other procedures   [x] Review HEP    [] Progressed/Changed HEP, detail:    [] Other detail:       Objective Information/Functional Measures/Assessment    - Less muscle tightness and TTP at the left latissimus dorsi and Teres major region but continues to have tissue tightness with limitations to left shoulder mobility  - AROM left shoulder Flx 73 ABD 52    Fair - good tolerance with treatment session today. Patient had no complaints with interventions and demonstrated good effort and participation with treatment program to aid with decreased pain and shoulder elevation.  Patient continues to make slow progress toward goals      Patient will continue to benefit from skilled PT / OT services to modify and progress therapeutic interventions, analyze and address functional mobility deficits, analyze and address ROM deficits, analyze and address strength deficits, analyze and address soft tissue restrictions, and analyze and cue for proper movement patterns to address functional deficits and attain remaining goals. Progress toward goals / Updated goals:  []  See Progress Note/Recertification     Updated Goals: to be achieved in 10 treatments:  1..  Pt to tolerate 30 min or more of TE and/or Interventions w/o increased s/s             PN Status:  Pt tolerate 66 min of treatment with some discomfort during exercises              Current Status: Little tolerance to treatment today with increased pain 2/3/23  2. Pt will report 75% improvement or better with Left Shoulder dysfunction to show a significant increase in ability to tolerate ADL             PN Status: Pt reports that it is better at about 50% better              Current Status:    3. Pt will have decreased pain at 2/10 or better with activity for carryover to performing increased ADLs with less discomfort for an improved quality of life              PN Status:  Continued 5/10 pain decreased to 4/10 after tx               Current Status: Progressing with pain 3/10 today 2/7/23  4. Pt will demonstrate MMT left shoulder Flx/ABD at 4/10 for progress to performing more activity with less difficulty               PN Status: Progressing Flx 3-/5 and Abd 2+/5             Current Status: Flx 3-+ ABD 3- 2/13/23  5. Pt will demonstrate AROM left shoulder Flx/ABD at 110* or better for carryover to more function with overhead activity. PN Status:  AROM Left Shoulder Flx 62* ABD 55              Current Status: AROM left shoulder flx 73 ABD 52 2/13/23  6.   Pt will improve FOTO score to 67 in 15 visits to show significant improvement for progress to good shoulder function of the Left UE             PN Status: Decreased to 41              Current Status:    PLAN  yes Continue plan of care  [x]  Upgrade activities as tolerated  []  Discharge due to :  []  Other:    Antoinette Ramirez, PT 2/13/2023    10:07 AM    Future Appointments   Date Time Provider Lolis Mayersi   2/17/2023  9:30 AM Melvi Campos, PT [facility]   2/20/2023  8:00 AM Melvi Campos, PT [facility]   2/24/2023  9:30 AM Melvi Campos, PT [facility]   2/27/2023  8:00 AM Melvi Campos, PT [facility]   3/1/2023  9:00 AM MD AXEL Pearl AMB

## 2023-02-17 ENCOUNTER — HOSPITAL ENCOUNTER (OUTPATIENT)
Facility: HOSPITAL | Age: 63
Setting detail: RECURRING SERIES
Discharge: HOME | End: 2023-02-20
Payer: COMMERCIAL

## 2023-02-17 PROCEDURE — 97035 APP MDLTY 1+ULTRASOUND EA 15: CPT

## 2023-02-17 PROCEDURE — 97140 MANUAL THERAPY 1/> REGIONS: CPT

## 2023-02-17 PROCEDURE — 97110 THERAPEUTIC EXERCISES: CPT

## 2023-02-17 NOTE — PROGRESS NOTES
PHYSICAL / OCCUPATIONAL THERAPY - DAILY TREATMENT NOTE (updated )    Patient Name: Rasheeda Ng. Date: 2023    : 1960  Insurance: Payor:  Fall / Plan:  Fall / Product Type: *No Product type* /      Patient  verified yes     Visit #   Current / Total 11 24   Time   In / Out 0935 1050   Pain   In / Out 4 4   Subjective Functional Status/Changes: Stiff!!   Changes to:  Meds, Allergies, Med Hx, Sx Hx? If yes, update Summary List no       TREATMENT AREA =  Left shoulder pain [M25.512]    OBJECTIVE    Modalities Rationale:     decrease edema, decrease inflammation, decrease pain, and increase tissue extensibility to improve patient's ability to progress to PLOF and address remaining functional goals. min [] Estim Unattended, type/location:                                      []  w/ice    []  w/heat    min [] Estim Attended, type/location:                                     []  w/US     []  w/ice    []  w/heat    []  TENS insruct      min []  Mechanical Traction: type/lbs                   []  pro   []  sup   []  int   []  cont    []  before manual    []  after manual   8 min [x]  Ultrasound, settings/location:  1Mhz. Pulsed @ 50% 1.7 W/cm2 Anterior Deltoid, Latissimus Dorsi, Terres Major/Minor,     min []  Iontophoresis w/ dexamethasone, location:                                               []  take home patch       []  in clinic    min  unbilled []  Ice     []  Heat    location/position:     min []  Paraffin,  details:     min []  Vasopneumatic Device, press/temp:     min []  Lesly Smith / Maryse Shi: If using vaso (only need to measure limb vaso being performed on)      pre-treatment girth :       post-treatment girth :       measured at (landmark location) :      min []  Other:    Skin assessment post-treatment (if applicable):    [x]  intact    []  redness- no adverse reaction                 []redness - adverse reaction:        Therapeutic Procedures:   Tx Min Billable or 1:1 Min (if diff from Tx Min) Procedure, Rationale, Specifics   48  23301 Therapeutic Exercise (timed):  increase ROM, strength, coordination, balance, and proprioception to improve patient's ability to progress to PLOF and address remaining functional goals. (see flow sheet as applicable)     Details if applicable:  FOTO Assessment 10' 24  38752 Manual Therapy (timed):  decrease pain, increase ROM, increase tissue extensibility, and decrease trigger points to improve patient's ability to progress to PLOF and address remaining functional goals. The manual therapy interventions were performed at a separate and distinct time from the therapeutic activities interventions . (see flow sheet as applicable)     Details if applicable:  STM/DTM/TPR Left Latissimus Dorsi/Teres major/minor/Rhomboids/Middle Traps     85350 Neuromuscular Re-Education (timed):  improve balance, coordination, kinesthetic sense, posture, core stability and proprioception to improve patient's ability to develop conscious control of individual muscles and awareness of position of extremities in order to progress to PLOF and address remaining functional goals.  (see flow sheet as applicable)               [de-identified]  [facility] Totals Reminder: bill using total billable min of TIMED therapeutic procedures (example: do not include dry needle or estim unattended, both untimed codes, in totals to left)  8-22 min = 1 unit; 23-37 min = 2 units; 38-52 min = 3 units; 53-67 min = 4 units; 68-82 min = 5 units   Total Total     [x]  Patient Education billed concurrently with other procedures   [x] Review HEP    [] Progressed/Changed HEP, detail:    [] Other detail:       Objective Information/Functional Measures/Assessment    - Less muscle tightness and TTP at the left latissimus dorsi region but continues to have tissue tightness with limitations to left shoulder mobility  - Possible scar tissue at the distal Teres minor region  - - Treat with DTM/TPR followed by Kyrie 86 = 43    Improved tolerance with treatment session today. Slight increased discomfort during resisted training and ROM with interventions but demonstrated good effort and participation with treatment program to aid with decreasing pain and increasing shoulder elevation and ER function. Patient continues to make slow progress toward goals      Patient will continue to benefit from skilled PT / OT services to modify and progress therapeutic interventions, analyze and address functional mobility deficits, analyze and address ROM deficits, analyze and address strength deficits, analyze and address soft tissue restrictions, and analyze and cue for proper movement patterns to address functional deficits and attain remaining goals. Progress toward goals / Updated goals:  []  See Progress Note/Recertification     Updated Goals: to be achieved in 10 treatments:  1..  Pt to tolerate 30 min or more of TE and/or Interventions w/o increased s/s             PN Status:  Pt tolerate 66 min of treatment with some discomfort during exercises              Current Status: Little tolerance to treatment today with increased pain 2/3/23  2. Pt will report 75% improvement or better with Left Shoulder dysfunction to show a significant increase in ability to tolerate ADL             PN Status: Pt reports that it is better at about 50% better              Current Status:    3. Pt will have decreased pain at 2/10 or better with activity for carryover to performing increased ADLs with less discomfort for an improved quality of life              PN Status:  Continued 5/10 pain decreased to 4/10 after tx               Current Status: Progressing with pain 4/10 today 2/17/23  4. Pt will demonstrate MMT left shoulder Flx/ABD at 4/10 for progress to performing more activity with less difficulty               PN Status: Progressing Flx 3-/5 and Abd 2+/5             Current Status: Flx 3-+ ABD 3- 2/13/23  5.   Pt will demonstrate AROM left shoulder Flx/ABD at 110* or better for carryover to more function with overhead activity. PN Status:  AROM Left Shoulder Flx 62* ABD 55              Current Status: AROM left shoulder flx 73 ABD 52 2/13/23  6.   Pt will improve FOTO score to 67 in 15 visits to show significant improvement for progress to good shoulder function of the Left UE             PN Status: Decreased to 41              Current Status: FOTO increased to 43    PLAN  yes Continue plan of care  [x]  Upgrade activities as tolerated  []  Discharge due to :  []  Other:    Adalid Daniel, PT    2/17/2023    10:08 AM    Future Appointments   Date Time Provider Lolis Solis   2/20/2023  8:00 AM Adalid Daniel PT [facility]   2/24/2023  9:30 AM Adalid Daniel PT [facility]   2/27/2023  8:00 AM Adalid Daniel PT [facility]   3/1/2023  9:00 AM Sindy Nielson MD [facility] AM[facility]

## 2023-02-24 ENCOUNTER — HOSPITAL ENCOUNTER (OUTPATIENT)
Facility: HOSPITAL | Age: 63
Setting detail: RECURRING SERIES
Discharge: HOME | End: 2023-02-27
Payer: COMMERCIAL

## 2023-02-24 PROCEDURE — 97112 NEUROMUSCULAR REEDUCATION: CPT

## 2023-02-24 PROCEDURE — 97035 APP MDLTY 1+ULTRASOUND EA 15: CPT

## 2023-02-24 PROCEDURE — 97140 MANUAL THERAPY 1/> REGIONS: CPT

## 2023-02-24 PROCEDURE — 97110 THERAPEUTIC EXERCISES: CPT

## 2023-02-26 NOTE — PROGRESS NOTES
PHYSICAL / OCCUPATIONAL THERAPY - DAILY TREATMENT NOTE (updated )    Patient Name: Jose Cole. Date: 23    : 1960  Insurance: Payor: Kandi Osborne / Plan: Kandi Osborne / Product Type: *No Product type* /      Patient  verified yes     Visit #   Current / Total 13 24   Time   In / Out 0803 0912   Pain   In / Out 4 3   Subjective Functional Status/Changes: Stiff and tight. Reports 60% improvement   Changes to:  Meds, Allergies, Med Hx, Sx Hx? If yes, update Summary List no       TREATMENT AREA =  Left shoulder pain [M25.512]    OBJECTIVE    Modalities Rationale:     decrease edema, decrease inflammation, decrease pain, and increase tissue extensibility to improve patient's ability to progress to PLOF and address remaining functional goals. min [] Estim Unattended, type/location:                                      []  w/ice    []  w/heat    min [] Estim Attended, type/location:                                     []  w/US     []  w/ice    []  w/heat    []  TENS insruct      min []  Mechanical Traction: type/lbs                   []  pro   []  sup   []  int   []  cont    []  before manual    []  after manual   8 min [x]  Ultrasound, settings/location: Pulsed t 50%  1Mhz. Pulsed @ 50% 1.7 W/cm2 Anterior Deltoid, Latissimus Dorsi, Terres Major/Minor, posterior Deltoid    min []  Iontophoresis w/ dexamethasone, location:                                               []  take home patch       []  in clinic    min  unbilled []  Ice     []  Heat    location/position:     min []  Paraffin,  details:     min []  Vasopneumatic Device, press/temp:     min []  Velia Munroe / Krystin Cabello:     If using vaso (only need to measure limb vaso being performed on)      pre-treatment girth :       post-treatment girth :       measured at (landmark location) :      min []  Other:    Skin assessment post-treatment (if applicable):    [x]  intact    []  redness- no adverse reaction                 []redness - adverse reaction: Therapeutic Procedures: Tx Min Billable or 1:1 Min (if diff from Tx Min) Procedure, Rationale, Specifics   24  46081 Therapeutic Exercise (timed):  increase ROM, strength, coordination, balance, and proprioception to improve patient's ability to progress to PLOF and address remaining functional goals. (see flow sheet as applicable)     Details if applicable:       14  97074 Manual Therapy (timed):  decrease pain, increase ROM, increase tissue extensibility, and decrease trigger points to improve patient's ability to progress to PLOF and address remaining functional goals. The manual therapy interventions were performed at a separate and distinct time from the therapeutic activities interventions . (see flow sheet as applicable)     Details if applicable:  STM/DTM/TPR Teres minor/Post Delt/Ant Delt   10  [id number] Neuromuscular Re-Education (timed):  improve balance, coordination, kinesthetic sense, posture, core stability and proprioception to improve patient's ability to develop conscious control of individual muscles and awareness of position of extremities in order to progress to PLOF and address remaining functional goals. (see flow sheet as applicable)               64  [facility] Totals Reminder: bill using total billable min of TIMED therapeutic procedures (example: do not include dry needle or estim unattended, both untimed codes, in totals to left)  8-22 min = 1 unit; 23-37 min = 2 units; 38-52 min = 3 units; 53-67 min = 4 units; 68-82 min = 5 units   Total Total     [x]  Patient Education billed concurrently with other procedures   [x] Review HEP    [] Progressed/Changed HEP, detail:    [] Other detail:       Objective Information/Functional Measures/Assessment  - Less muscle tightness at the latissimus dorsi  - TTP at the left posterior/Lateral/and Anterior delt  - Continue to address scar tissue and TrPs  - - Treat with DTM/TPR followed by US      Improved tolerance with treatment session today.  Slight increased discomfort during resisted training and ROM with interventions but demonstrated good effort and participation with treatment program to aid with decreasing pain and increasing shoulder elevation and ER function. Patient continues to make slow progress toward goals      Patient will continue to benefit from skilled PT / OT services to modify and progress therapeutic interventions, analyze and address functional mobility deficits, analyze and address ROM deficits, analyze and address strength deficits, analyze and address soft tissue restrictions, and analyze and cue for proper movement patterns to address functional deficits and attain remaining goals. Progress toward goals / Updated goals:  []  See Progress Note/Recertification     Updated Goals: to be achieved in 10 treatments:  1..  Pt to tolerate 30 min or more of TE and/or Interventions w/o increased s/s             PN Status:  Pt tolerate 66 min of treatment with some discomfort during exercises              Current Status: Little tolerance to treatment today with increased pain 2/3/23  2. Pt will report 75% improvement or better with Left Shoulder dysfunction to show a significant increase in ability to tolerate ADL             PN Status: Pt reports that it is better at about 50% better              Current Status:  Progressing at 60% Improvement 2/27/23  3. Pt will have decreased pain at 2/10 or better with activity for carryover to performing increased ADLs with less discomfort for an improved quality of life              PN Status:  Continued 5/10 pain decreased to 4/10 after tx               Current Status: Progressing with pain 4/10 today 2/27/23  4. Pt will demonstrate MMT left shoulder Flx/ABD at 4/10 for progress to performing more activity with less difficulty               PN Status: Progressing Flx 3-/5 and Abd 2+/5             Current Status: Flx 3-+ ABD 3- 2/13/23  5.   Pt will demonstrate AROM left shoulder Flx/ABD at 110* or better for carryover to more function with overhead activity. PN Status:  AROM Left Shoulder Flx 62* ABD 55              Current Status: AROM left shoulder flx 73 ABD 52 2/13/23  6.   Pt will improve FOTO score to 67 in 15 visits to show significant improvement for progress to good shoulder function of the Left UE             PN Status: Decreased to 41              Current Status: FOTO increased to 43 2/17/23    PLAN  yes Continue plan of care  [x]  Upgrade activities as tolerated  []  Discharge due to :  []  Other:    Arsenio Camejo PT    2/27/23   8:26 AM    Future Appointments   Date Time Provider Lolis Solis   2/27/2023  8:00 AM Arsenio Camejo PT [facility] [address] [facility]   3/1/2023  9:00 AM Pedrito Frias MD [facility] AM[facility]

## 2023-02-27 ENCOUNTER — HOSPITAL ENCOUNTER (OUTPATIENT)
Facility: HOSPITAL | Age: 63
Setting detail: RECURRING SERIES
Discharge: HOME | End: 2023-03-02
Payer: COMMERCIAL

## 2023-02-27 PROCEDURE — 97112 NEUROMUSCULAR REEDUCATION: CPT

## 2023-02-27 PROCEDURE — 97110 THERAPEUTIC EXERCISES: CPT

## 2023-02-27 PROCEDURE — 97140 MANUAL THERAPY 1/> REGIONS: CPT

## 2023-02-27 PROCEDURE — 97035 APP MDLTY 1+ULTRASOUND EA 15: CPT

## 2023-03-01 ENCOUNTER — OFFICE VISIT (OUTPATIENT)
Facility: CLINIC | Age: 63
End: 2023-03-01
Payer: COMMERCIAL

## 2023-03-01 ENCOUNTER — HOSPITAL ENCOUNTER (OUTPATIENT)
Facility: HOSPITAL | Age: 63
Setting detail: SPECIMEN
Discharge: HOME | End: 2023-03-04
Payer: COMMERCIAL

## 2023-03-01 VITALS
BODY MASS INDEX: 24.07 KG/M2 | HEIGHT: 67 IN | WEIGHT: 153.38 LBS | RESPIRATION RATE: 16 BRPM | OXYGEN SATURATION: 98 % | SYSTOLIC BLOOD PRESSURE: 148 MMHG | HEART RATE: 73 BPM | TEMPERATURE: 97.8 F | DIASTOLIC BLOOD PRESSURE: 91 MMHG

## 2023-03-01 DIAGNOSIS — Z12.5 PROSTATE CANCER SCREENING: ICD-10-CM

## 2023-03-01 DIAGNOSIS — I10 ESSENTIAL (PRIMARY) HYPERTENSION: Primary | ICD-10-CM

## 2023-03-01 DIAGNOSIS — N52.9 ERECTILE DYSFUNCTION, UNSPECIFIED ERECTILE DYSFUNCTION TYPE: ICD-10-CM

## 2023-03-01 DIAGNOSIS — E55.9 VITAMIN D DEFICIENCY, UNSPECIFIED: ICD-10-CM

## 2023-03-01 PROBLEM — N20.1 CALCULUS OF URETER: Status: ACTIVE | Noted: 2021-10-23

## 2023-03-01 PROBLEM — N20.0 NEPHROLITHIASIS: Status: ACTIVE | Noted: 2022-06-08

## 2023-03-01 LAB
25(OH)D3 SERPL-MCNC: 54.7 NG/ML (ref 30–100)
ALBUMIN SERPL-MCNC: 3.8 G/DL (ref 3.4–5)
ALBUMIN/GLOB SERPL: 1 (ref 0.8–1.7)
ALP SERPL-CCNC: 96 U/L (ref 45–117)
ALT SERPL-CCNC: 45 U/L (ref 16–61)
ANION GAP SERPL CALC-SCNC: 4 MMOL/L (ref 3–18)
AST SERPL-CCNC: 22 U/L (ref 10–38)
BILIRUB SERPL-MCNC: 0.5 MG/DL (ref 0.2–1)
BUN SERPL-MCNC: 23 MG/DL (ref 7–18)
BUN/CREAT SERPL: 26 (ref 12–20)
CALCIUM SERPL-MCNC: 9.5 MG/DL (ref 8.5–10.1)
CHLORIDE SERPL-SCNC: 106 MMOL/L (ref 100–111)
CHOLEST SERPL-MCNC: 167 MG/DL
CO2 SERPL-SCNC: 27 MMOL/L (ref 21–32)
CREAT SERPL-MCNC: 0.87 MG/DL (ref 0.6–1.3)
ERYTHROCYTE [DISTWIDTH] IN BLOOD BY AUTOMATED COUNT: 15.9 % (ref 11.6–14.5)
GLOBULIN SER CALC-MCNC: 4 G/DL (ref 2–4)
GLUCOSE SERPL-MCNC: 144 MG/DL (ref 74–99)
HCT VFR BLD AUTO: 49.9 % (ref 36–48)
HDLC SERPL-MCNC: 65 MG/DL (ref 40–60)
HDLC SERPL: 2.6 (ref 0–5)
HGB BLD-MCNC: 14.9 G/DL (ref 13–16)
LDLC SERPL CALC-MCNC: 81.6 MG/DL (ref 0–100)
LIPID PANEL: ABNORMAL
MCH RBC QN AUTO: 23.8 PG (ref 24–34)
MCHC RBC AUTO-ENTMCNC: 29.9 G/DL (ref 31–37)
MCV RBC AUTO: 79.6 FL (ref 78–100)
NRBC # BLD: 0 K/UL (ref 0–0.01)
NRBC BLD-RTO: 0 PER 100 WBC
PLATELET # BLD AUTO: 311 K/UL (ref 135–420)
PMV BLD AUTO: 10.6 FL (ref 9.2–11.8)
POTASSIUM SERPL-SCNC: 4.3 MMOL/L (ref 3.5–5.5)
PROT SERPL-MCNC: 7.8 G/DL (ref 6.4–8.2)
PSA SERPL-MCNC: 0.4 NG/ML (ref 0–4)
RBC # BLD AUTO: 6.27 M/UL (ref 4.35–5.65)
SODIUM SERPL-SCNC: 137 MMOL/L (ref 136–145)
TRIGL SERPL-MCNC: 102 MG/DL
VLDLC SERPL CALC-MCNC: 20.4 MG/DL
WBC # BLD AUTO: 5.5 K/UL (ref 4.6–13.2)

## 2023-03-01 PROCEDURE — 36415 COLL VENOUS BLD VENIPUNCTURE: CPT

## 2023-03-01 PROCEDURE — 99396 PREV VISIT EST AGE 40-64: CPT | Performed by: FAMILY MEDICINE

## 2023-03-01 PROCEDURE — 85027 COMPLETE CBC AUTOMATED: CPT

## 2023-03-01 PROCEDURE — 80061 LIPID PANEL: CPT

## 2023-03-01 PROCEDURE — 80053 COMPREHEN METABOLIC PANEL: CPT

## 2023-03-01 PROCEDURE — 3077F SYST BP >= 140 MM HG: CPT | Performed by: FAMILY MEDICINE

## 2023-03-01 PROCEDURE — 84402 ASSAY OF FREE TESTOSTERONE: CPT

## 2023-03-01 PROCEDURE — 3080F DIAST BP >= 90 MM HG: CPT | Performed by: FAMILY MEDICINE

## 2023-03-01 PROCEDURE — 84153 ASSAY OF PSA TOTAL: CPT

## 2023-03-01 PROCEDURE — 82306 VITAMIN D 25 HYDROXY: CPT

## 2023-03-01 RX ORDER — AMLODIPINE BESYLATE 5 MG/1
5 TABLET ORAL DAILY
Qty: 30 TABLET | Refills: 5 | Status: SHIPPED | OUTPATIENT
Start: 2023-03-01

## 2023-03-01 RX ORDER — ERGOCALCIFEROL 1.25 MG/1
50000 CAPSULE ORAL
Qty: 16 CAPSULE | Refills: 1 | Status: SHIPPED | OUTPATIENT
Start: 2023-03-01

## 2023-03-01 SDOH — ECONOMIC STABILITY: INCOME INSECURITY: HOW HARD IS IT FOR YOU TO PAY FOR THE VERY BASICS LIKE FOOD, HOUSING, MEDICAL CARE, AND HEATING?: SOMEWHAT HARD

## 2023-03-01 SDOH — ECONOMIC STABILITY: FOOD INSECURITY: WITHIN THE PAST 12 MONTHS, THE FOOD YOU BOUGHT JUST DIDN'T LAST AND YOU DIDN'T HAVE MONEY TO GET MORE.: SOMETIMES TRUE

## 2023-03-01 SDOH — ECONOMIC STABILITY: FOOD INSECURITY: WITHIN THE PAST 12 MONTHS, YOU WORRIED THAT YOUR FOOD WOULD RUN OUT BEFORE YOU GOT MONEY TO BUY MORE.: SOMETIMES TRUE

## 2023-03-01 SDOH — ECONOMIC STABILITY: HOUSING INSECURITY
IN THE LAST 12 MONTHS, WAS THERE A TIME WHEN YOU DID NOT HAVE A STEADY PLACE TO SLEEP OR SLEPT IN A SHELTER (INCLUDING NOW)?: NO

## 2023-03-01 ASSESSMENT — ENCOUNTER SYMPTOMS
RESPIRATORY NEGATIVE: 1
VOMITING: 0
COUGH: 0
NAUSEA: 0

## 2023-03-01 ASSESSMENT — PATIENT HEALTH QUESTIONNAIRE - PHQ9
6. FEELING BAD ABOUT YOURSELF - OR THAT YOU ARE A FAILURE OR HAVE LET YOURSELF OR YOUR FAMILY DOWN: 0
5. POOR APPETITE OR OVEREATING: 0
7. TROUBLE CONCENTRATING ON THINGS, SUCH AS READING THE NEWSPAPER OR WATCHING TELEVISION: 0
SUM OF ALL RESPONSES TO PHQ QUESTIONS 1-9: 4
SUM OF ALL RESPONSES TO PHQ QUESTIONS 1-9: 4
4. FEELING TIRED OR HAVING LITTLE ENERGY: 1
10. IF YOU CHECKED OFF ANY PROBLEMS, HOW DIFFICULT HAVE THESE PROBLEMS MADE IT FOR YOU TO DO YOUR WORK, TAKE CARE OF THINGS AT HOME, OR GET ALONG WITH OTHER PEOPLE: 1
2. FEELING DOWN, DEPRESSED OR HOPELESS: 1
SUM OF ALL RESPONSES TO PHQ QUESTIONS 1-9: 4
1. LITTLE INTEREST OR PLEASURE IN DOING THINGS: 1
SUM OF ALL RESPONSES TO PHQ QUESTIONS 1-9: 4
3. TROUBLE FALLING OR STAYING ASLEEP: 1
9. THOUGHTS THAT YOU WOULD BE BETTER OFF DEAD, OR OF HURTING YOURSELF: 0
SUM OF ALL RESPONSES TO PHQ9 QUESTIONS 1 & 2: 2
8. MOVING OR SPEAKING SO SLOWLY THAT OTHER PEOPLE COULD HAVE NOTICED. OR THE OPPOSITE, BEING SO FIGETY OR RESTLESS THAT YOU HAVE BEEN MOVING AROUND A LOT MORE THAN USUAL: 0

## 2023-03-01 NOTE — PROGRESS NOTES
Chief Complaint   Patient presents with    Annual Exam     bloodwork     Patient would like to get liver checked with bloodwork. 1. \"Have you been to the ER, urgent care clinic since your last visit? Hospitalized since your last visit? \" Yes Reason for visit: left shoulder pain and kidney stones. 2. \"Have you seen or consulted any other health care providers outside of the [address] [place] [address] since your last visit? \" Yes Where: kidney stones [facility] view      3. For patients aged 39-70: Has the patient had a colonoscopy / FIT/ Cologuard? No      If the patient is female:    4. For patients aged 41-77: Has the patient had a mammogram within the past 2 years? NA - based on age or sex      11. For patients aged 21-65: Has the patient had a pap smear?  NA - based on age or sex

## 2023-03-01 NOTE — PROGRESS NOTES
Ryan Harding is a 63 y.o. male  presents for physical exam.  No new complaints    Chief Complaint   Patient presents with    Annual Exam     bloodwork        No Known Allergies    Current Outpatient Medications:     amLODIPine (NORVASC) 5 MG tablet, Take 5 mg by mouth daily (Patient not taking: Reported on 3/1/2023), Disp: , Rfl:     diclofenac (VOLTAREN) 50 MG EC tablet, Take 50 mg by mouth 2 times daily (Patient not taking: Reported on 3/1/2023), Disp: , Rfl:     ergocalciferol (ERGOCALCIFEROL) 1.25 MG (66099 UT) capsule, Take 50,000 Units by mouth every 7 days (Patient not taking: Reported on 3/1/2023), Disp: , Rfl:     methocarbamol (ROBAXIN) 500 MG tablet, Take 500 mg by mouth 2 times daily as needed (Patient not taking: Reported on 3/1/2023), Disp: , Rfl:    Patient Active Problem List   Diagnosis    Advanced directives, counseling/discussion    Pain of right hip joint    Chronic right shoulder pain    Flank pain    Calculus of ureter    Elevated blood pressure    Hypertension    Migraine variant    Nephrolithiasis     Past Medical History:   Diagnosis Date    Hypertension      Social History     Socioeconomic History    Marital status:      Spouse name: None    Number of children: None    Years of education: None    Highest education level: None   Tobacco Use    Smoking status: Never    Smokeless tobacco: Never   Substance and Sexual Activity    Alcohol use: Yes     Alcohol/week: 8.0 standard drinks    Drug use: No     Social Determinants of Health     Financial Resource Strain: Medium Risk    Difficulty of Paying Living Expenses: Somewhat hard   Food Insecurity: Food Insecurity Present    Worried About Running Out of Food in the Last Year: Sometimes true    Ran Out of Food in the Last Year: Sometimes true   Transportation Needs: Unknown    Lack of Transportation (Non-Medical): No   Housing Stability: Unknown    Unstable Housing in the Last Year: No     No family history on file.      Review of Systems   Constitutional: Negative. Negative for fever. Respiratory: Negative. Negative for cough. Cardiovascular:  Negative for chest pain. Gastrointestinal:  Negative for nausea and vomiting. Neurological: Negative. Negative for weakness. Psychiatric/Behavioral: Negative. Negative for behavioral problems and suicidal ideas. Vitals:    03/01/23 0858   BP: (!) 148/91   Pulse: 73   Resp: 16   Temp: 97.8 °F (36.6 °C)   SpO2: 98%        Physical Exam  Constitutional:       Appearance: Normal appearance. HENT:      Mouth/Throat:      Mouth: Mucous membranes are moist.      Pharynx: Oropharynx is clear. Eyes:      Extraocular Movements: Extraocular movements intact. Conjunctiva/sclera: Conjunctivae normal.      Pupils: Pupils are equal, round, and reactive to light. Cardiovascular:      Rate and Rhythm: Normal rate and regular rhythm. Pulses: Normal pulses. Heart sounds: Normal heart sounds. No murmur heard. Pulmonary:      Effort: Pulmonary effort is normal.      Breath sounds: Normal breath sounds. Musculoskeletal:         General: Normal range of motion. Cervical back: Normal range of motion and neck supple. Skin:     General: Skin is warm and dry. Capillary Refill: Capillary refill takes less than 2 seconds. Neurological:      General: No focal deficit present. Mental Status: He is alert and oriented to person, place, and time. Psychiatric:         Mood and Affect: Mood normal.         Behavior: Behavior normal.         Thought Content: Thought content normal.         Judgment: Judgment normal.        Assessment/Plan     Diagnosis Orders   1. Essential (primary) hypertension  Comprehensive Metabolic Panel    CBC    Lipid Panel    amLODIPine (NORVASC) 5 MG tablet    Lipid Panel    CBC    Comprehensive Metabolic Panel      2.  Vitamin D deficiency, unspecified  Vitamin D 25 Hydroxy    ergocalciferol (ERGOCALCIFEROL) 1.25 MG (13760 UT) capsule    Vitamin D 25 Hydroxy      3. Prostate cancer screening  PSA Screening    PSA Screening      4. Erectile dysfunction, unspecified erectile dysfunction type  Testosterone, free, total    Testosterone, free, total        I have discussed the diagnosis with the patient and the intended plan of care as seen in the above orders. The patient has received an after-visit summary and questions were answered concerning future plans. I have discussed medication, side effects, and warnings with the patient in detail. The patient verbalized understanding and is in agreement with the plan of care. The patient will contact the office with any additional concerns.   lab results and schedule of future lab studies reviewed with patient    Elisha Almanza MD

## 2023-03-02 LAB
TESTOST FREE SERPL-MCNC: NORMAL PG/ML
TESTOST SERPL-MCNC: 323 NG/DL (ref 264–916)

## 2023-03-03 ENCOUNTER — HOSPITAL ENCOUNTER (OUTPATIENT)
Facility: HOSPITAL | Age: 63
Setting detail: RECURRING SERIES
Discharge: HOME | End: 2023-03-06
Payer: COMMERCIAL

## 2023-03-03 ENCOUNTER — HOSPITAL ENCOUNTER (OUTPATIENT)
Facility: HOSPITAL | Age: 63
Setting detail: SPECIMEN
End: 2023-03-03
Payer: COMMERCIAL

## 2023-03-03 DIAGNOSIS — Z12.11 COLON CANCER SCREENING: ICD-10-CM

## 2023-03-03 DIAGNOSIS — Z12.11 COLON CANCER SCREENING: Primary | ICD-10-CM

## 2023-03-03 PROCEDURE — 97112 NEUROMUSCULAR REEDUCATION: CPT

## 2023-03-03 PROCEDURE — 97110 THERAPEUTIC EXERCISES: CPT

## 2023-03-03 PROCEDURE — 82274 ASSAY TEST FOR BLOOD FECAL: CPT

## 2023-03-03 PROCEDURE — 97035 APP MDLTY 1+ULTRASOUND EA 15: CPT

## 2023-03-03 PROCEDURE — 97140 MANUAL THERAPY 1/> REGIONS: CPT

## 2023-03-03 NOTE — PROGRESS NOTES
[facility] at [address] [facility]  [address]. [facility] [address]  Phone: [phone]      Fax:  [phone]    TENS Request    Patient name: Karl Bee. Referral source: Devon Flower MD  : 1960     Diagnosis: Left shoulder pain [M25.512]    Visits from Start of Care: 22    Missed Visits: 1    Dr Devon Flower MD,   Your patient, Karl Morrow, is a candidate for a home tens unit for independent symptom management and improved functional mobility. Please sign this order if you agree the patient will benefit from this device. Thank you. Dorothea Peres, PT 3/3/2023 1:50 PM  NOTE TO PHYSICIAN:  PLEASE COMPLETE THE ORDERS BELOW AND   FAX TO [facility]: [phone]  If you are unable to process this request in 24 hours please contact our office:   [phone]      []  I have read the above request and agree that my patient is an appropriate candidate.   []  Other: _______________________________________________________________      [id number] Signature:____________Date:_________TIME:________     Devon Flower MD  ** Signature, Date and Time must be completed for valid certification **

## 2023-03-03 NOTE — PROGRESS NOTES
PHYSICAL / OCCUPATIONAL THERAPY - DAILY TREATMENT NOTE (updated )    Patient Name: Lonnie Singh. Date: 23    : 1960  Insurance: Payor: [employer] / Plan: [employer] / Product Type: *No Product type* /      Patient  verified yes     Visit #   Current / Total 14 24   Time   In / Out 0942 1058   Pain   In / Out 4 3   Subjective Functional Status/Changes: Stiff and tight. Reports 60% improvement   Changes to:  Meds, Allergies, Med Hx, Sx Hx? If yes, update Summary List no       TREATMENT AREA =  Left shoulder pain [M25.512]    OBJECTIVE    Modalities Rationale:     decrease edema, decrease inflammation, decrease pain, and increase tissue extensibility to improve patient's ability to progress to PLOF and address remaining functional goals. min [] Estim Unattended, type/location:                                      []  w/ice    []  w/heat    min [] Estim Attended, type/location:                                     []  w/US     []  w/ice    []  w/heat    []  TENS insruct      min []  Mechanical Traction: type/lbs                   []  pro   []  sup   []  int   []  cont    []  before manual    []  after manual   8 min [x]  Ultrasound, settings/location: Pulsed t 50%  3.3Mhz. Pulsed @ 50% 1.3 W/cm2 Anterior Deltoid,    posterior Deltoid    min []  Iontophoresis w/ dexamethasone, location:                                               []  take home patch       []  in clinic    min  unbilled []  Ice     []  Heat    location/position:     min []  Paraffin,  details:     min []  Vasopneumatic Device, press/temp:     min []  Junious Latus / Nicholas Elvira: If using vaso (only need to measure limb vaso being performed on)      pre-treatment girth :       post-treatment girth :       measured at (landmark location) :      min []  Other:    Skin assessment post-treatment (if applicable):    [x]  intact    []  redness- no adverse reaction                 []redness - adverse reaction:        Therapeutic Procedures:   Tx Min Billable or 1:1 Min (if diff from Tx Min) Procedure, Rationale, Specifics   28  59934 Therapeutic Exercise (timed):  increase ROM, strength, coordination, balance, and proprioception to improve patient's ability to progress to PLOF and address remaining functional goals. (see flow sheet as applicable)     Details if applicable:       16  67069 Manual Therapy (timed):  decrease pain, increase ROM, increase tissue extensibility, and decrease trigger points to improve patient's ability to progress to PLOF and address remaining functional goals. The manual therapy interventions were performed at a separate and distinct time from the therapeutic activities interventions . (see flow sheet as applicable)     Details if applicable:  STM/DTM/TPR Teres minor/Post Delt/Ant Delt   12  [id number] Neuromuscular Re-Education (timed):  improve balance, coordination, kinesthetic sense, posture, core stability and proprioception to improve patient's ability to develop conscious control of individual muscles and awareness of position of extremities in order to progress to PLOF and address remaining functional goals.  (see flow sheet as applicable)               59  [facility] Totals Reminder: bill using total billable min of TIMED therapeutic procedures (example: do not include dry needle or estim unattended, both untimed codes, in totals to left)  8-22 min = 1 unit; 23-37 min = 2 units; 38-52 min = 3 units; 53-67 min = 4 units; 68-82 min = 5 units   Total Total     [x]  Patient Education billed concurrently with other procedures   [x] Review HEP    [] Progressed/Changed HEP, detail:    [] Other detail:       Objective Information/Functional Measures/Assessment    - Continued TTP at the anterior deltoid  - less muscle tightness at the lateral and posterior deltoid muscles and latissimus dorsi  - Less muscle tightness at the latissimus dorsi and Teres Minor/Major region  - Continue to address scar tissue and TrPs  - - Treat with DTM/TPR followed by Brandy Cope    At this point, I believe that this pt will benefit from a home E-Stim/IFC unit for pain modulation of his Left Sholder, as he has seen benefit from it in the clinic. S[facility] to [facility]     Improved tolerance with treatment session today. Slight increased discomfort during resisted training and ROM with interventions but demonstrated good effort and participation with treatment program to aid with decreasing pain and increasing shoulder elevation and ER function. Patient continues to make slow progress toward goals      Patient will continue to benefit from skilled PT / OT services to modify and progress therapeutic interventions, analyze and address functional mobility deficits, analyze and address ROM deficits, analyze and address strength deficits, analyze and address soft tissue restrictions, and analyze and cue for proper movement patterns to address functional deficits and attain remaining goals. Progress toward goals / Updated goals:  []  See Progress Note/Recertification     Updated Goals: to be achieved in 10 treatments:  1..  Pt to tolerate 30 min or more of TE and/or Interventions w/o increased s/s             PN Status:  Pt tolerate 66 min of treatment with some discomfort during exercises              Current Status: Little tolerance to treatment today with increased pain 2/3/23  2. Pt will report 75% improvement or better with Left Shoulder dysfunction to show a significant increase in ability to tolerate ADL             PN Status: Pt reports that it is better at about 50% better              Current Status:  Progressing at 60% Improvement 2/27/23  3. Pt will have decreased pain at 2/10 or better with activity for carryover to performing increased ADLs with less discomfort for an improved quality of life              PN Status:  Continued 5/10 pain decreased to 4/10 after tx               Current Status: Progressing with pain 4/10 today 2/27/23  4.   Pt will demonstrate MMT left shoulder Flx/ABD at 4/10 for progress to performing more activity with less difficulty               PN Status: Progressing Flx 3-/5 and Abd 2+/5             Current Status: Flx 3-+ ABD 3- 2/13/23  5. Pt will demonstrate AROM left shoulder Flx/ABD at 110* or better for carryover to more function with overhead activity. PN Status:  AROM Left Shoulder Flx 62* ABD 55              Current Status: AROM left shoulder flx 73 ABD 52 2/13/23  6.   Pt will improve FOTO score to 67 in 15 visits to show significant improvement for progress to good shoulder function of the Left UE             PN Status: Decreased to 41              Current Status: FOTO increased to 43 2/17/23    PLAN  yes Continue plan of care  [x]  Upgrade activities as tolerated  []  Discharge due to :  []  Other:    Jv Cuello, PT    2/27/23   8:26 AM    Future Appointments   Date Time Provider Lolis Solis   3/10/2023  9:30 AM Jv Cuello, PT [facility]   3/17/2023  9:30 AM Jv Cuello, PT [facility]   3/24/2023  9:30 AM Harpal Escoto

## 2023-03-03 NOTE — PROGRESS NOTES
[facility] at [address]  [place] [address] [address] [facility] [address]  Phone: [phone]      Fax:  [phone]    Progress Note  Patient name: Bess Lunsford. Start of Care: 2022   Referral source: Michael Black MD : 1960               Medical Diagnosis: Pain in left shoulder [M25.512]  Strain of muscle(s) and tendon(s) of the rotator cuff of left shoulder, subsequent encounter [S46.012D]  Payor: [address] / Plan: [address] / Product Type: PPO /  Onset Date: Aug 2021               Treatment Diagnosis: Left Shoulder Pain   Prior Hospitalization: see medical history Provider#: [id number]   Medications: Verified on Patient summary List   Comorbidities: OA, HTN  Prior Level of Function: Shoulder pain and discomfort prior to and after surgery  Visits from Start of Care: 21                                                Missed Visits: 1    Updated Goals/Measure of Progress: To be achieved in 10 visits:    1..  Pt to tolerate 30 min or more of TE and/or Interventions w/o increased s/s             PN Status:  Pt tolerate 66 min of treatment with some discomfort during exercises              Current Status: Little tolerance to treatment today with increased pain 2/3/23  2. Pt will report 75% improvement or better with Left Shoulder dysfunction to show a significant increase in ability to tolerate ADL             PN Status: Pt reports that it is better at about 50% better              Current Status:  Progressing at 60% Improvement 23  3. Pt will have decreased pain at 2/10 or better with activity for carryover to performing increased ADLs with less discomfort for an improved quality of life              PN Status:  Continued 5/10 pain decreased to 4/10 after tx               Current Status: Progressing with pain 4/10 today 23  4.   Pt will demonstrate MMT left shoulder Flx/ABD at 4/10 for progress to performing more activity with less difficulty PN Status: Progressing Flx 3-/5 and Abd 2+/5             Current Status: Flx 3-+ ABD 3- 2/13/23  5. Pt will demonstrate AROM left shoulder Flx/ABD at 110* or better for carryover to more function with overhead activity. PN Status:  AROM Left Shoulder Flx 62* ABD 55              Current Status: AROM left shoulder flx 73 ABD 52 2/13/23  6. Pt will improve FOTO score to 67 in 15 visits to show significant improvement for progress to good shoulder function of the Left UE             PN Status: Decreased to 41              Current Status: FOTO increased to 43 2/17/23    Summary of Care/ Key Functional Changes: Patient has shown good progress with this treatment program. Pain as of last visit was 4/10 with more c/o stiffness. Patient has shown pain at 3-4/10 with slight increased mobility and strength of the left shoulder. Gains are minimal and slow but steady. Patient reports 50 - 60% improvement with overall involvement with reports that he can now use it more and is able to get it up. At one point patient reports that he was not able to use it much at all. FOTO score is 43. All STG/LTGs achieved as identified above.           ASSESSMENT/RECOMMENDATIONS:  [x]Continue therapy per initial plan/protocol at a frequency of  2 x per week for 19 treatments   []Continue therapy with the following recommended changes:_____________________      _____________________________________________________________________  []Discontinue therapy progressing towards or have reached established goals  []Discontinue therapy due to lack of appreciable progress towards goals  []Discontinue therapy due to lack of attendance or compliance  []Await Physician's recommendations/decisions regarding therapy  []Other:________________________________________________________________    Thank you for this referral.   Dorothea Peres, PT 3/3/2023 10:14 AM

## 2023-03-04 LAB — HEMOCCULT STL QL IA: NEGATIVE

## 2023-03-07 ENCOUNTER — HOSPITAL ENCOUNTER (OUTPATIENT)
Facility: HOSPITAL | Age: 63
Setting detail: RECURRING SERIES
Discharge: HOME | End: 2023-03-10
Payer: COMMERCIAL

## 2023-03-07 PROCEDURE — 97530 THERAPEUTIC ACTIVITIES: CPT

## 2023-03-07 PROCEDURE — 97110 THERAPEUTIC EXERCISES: CPT

## 2023-03-07 NOTE — PROGRESS NOTES
PHYSICAL / OCCUPATIONAL THERAPY - DAILY TREATMENT NOTE (updated )    Patient Name: Malu Sumner. Date: 3/7/2023    : 1960  Insurance: Payor: [employer] / Plan: Wil [employer]ell / Product Type: *No Product type* /      Patient  verified Yes     Visit #   Current / Total 15 24   Time   In / Out 739 810   Pain   In / Out 5/10 4/10   Subjective Functional Status/Changes: Slightly increased pain compared to last visit. Patient reports no change in function over the weekend. Patient arrives 9 minutes late    Changes to:  Meds, Allergies, Med Hx, Sx Hx? If yes, update Summary List no       TREATMENT AREA =  Left shoulder pain [M25.512]    OBJECTIVE        Therapeutic Procedures: Tx Min Billable or 1:1 Min (if diff from Tx Min) Procedure, Rationale, Specifics   21  81943 Therapeutic Exercise (timed):  increase ROM, strength, coordination, balance, and proprioception to improve patient's ability to progress to PLOF and address remaining functional goals. (see flow sheet as applicable)     Details if applicable:       10  71287 Therapeutic Activity (timed):  use of dynamic activities replicating functional movements to increase ROM, strength, coordination, balance, and proprioception in order to improve patient's ability to progress to PLOF and address remaining functional goals.   (see flow sheet as applicable)     Details if applicable:            Details if applicable:            Details if applicable:            Details if applicable:     32  [facility] Totals Reminder: bill using total billable min of TIMED therapeutic procedures (example: do not include dry needle or estim unattended, both untimed codes, in totals to left)  8-22 min = 1 unit; 23-37 min = 2 units; 38-52 min = 3 units; 53-67 min = 4 units; 68-82 min = 5 units   Total Total     TOTAL TREATMENT TIME:        31     [x]  Patient Education billed concurrently with other procedures   [x] Review HEP    [] Progressed/Changed HEP, detail:    [] Other detail: Objective Information/Functional Measures/Assessment    Patient tolerated treatment session well today. Patient had no complaints with exercise program to accomplish increased functional mobility and strength. Patient continues to make steady progress toward goals and would benefit from continued skilled PT intervention to address remaining deficits outlined in goals below. Patient will continue to benefit from skilled PT / OT services to modify and progress therapeutic interventions, analyze and address functional mobility deficits, analyze and address ROM deficits, analyze and address strength deficits, analyze and address soft tissue restrictions, analyze and cue for proper movement patterns, and analyze and modify for postural abnormalities to address functional deficits and attain remaining goals. Progress toward goals / Updated goals:  []  See Progress Note/Recertification    1..  Pt to tolerate 30 min or more of TE and/or Interventions w/o increased s/s             PN Status:  Pt tolerate 66 min of treatment with some discomfort during exercises              Current Status: Little tolerance to treatment today with increased pain 2/3/23  2. Pt will report 75% improvement or better with Left Shoulder dysfunction to show a significant increase in ability to tolerate ADL             PN Status: Pt reports that it is better at about 50% better              Current Status:  Progressing at 60% Improvement 2/27/23  3. Pt will have decreased pain at 2/10 or better with activity for carryover to performing increased ADLs with less discomfort for an improved quality of life              PN Status:  Continued 5/10 pain decreased to 4/10 after tx               Current Status: Progressing with pain 4/10 today 2/27/23  4.   Pt will demonstrate MMT left shoulder Flx/ABD at 4/10 for progress to performing more activity with less difficulty               PN Status: Progressing Flx 3-/5 and Abd 2+/5 Current Status: Flx 3-+ ABD 3- 2/13/23  5. Pt will demonstrate AROM left shoulder Flx/ABD at 110* or better for carryover to more function with overhead activity. PN Status:  AROM Left Shoulder Flx 62* ABD 55              Current Status: AROM left shoulder flx 73 ABD 52 2/13/23  6.   Pt will improve FOTO score to 67 in 15 visits to show significant improvement for progress to good shoulder function of the Left UE             PN Status: Decreased to 41              Current Status: FOTO increased to 43 2/17/23       PLAN  Yes  Continue plan of care  [x]  Upgrade activities as tolerated  []  Discharge due to :  []  Other:    Caprice Mcneill PTA    3/7/2023    7:49 AM    Future Appointments   Date Time Provider Lolis Solis   3/10/2023  9:30 AM Kennedy Stokes, PT [facility]   3/14/2023  7:30 [address] [facility]   3/17/2023  9:30 AM Kennedy Stokes, VITO [facility]   3/21/2023  7:30 AM Caprice Mcneill PTA [facility]   3/24/2023  9:30 AM Harpal Johnson

## 2023-03-10 ENCOUNTER — HOSPITAL ENCOUNTER (OUTPATIENT)
Facility: HOSPITAL | Age: 63
Setting detail: RECURRING SERIES
Discharge: HOME | End: 2023-03-13
Payer: COMMERCIAL

## 2023-03-10 PROCEDURE — G0283 ELEC STIM OTHER THAN WOUND: HCPCS

## 2023-03-10 PROCEDURE — 97112 NEUROMUSCULAR REEDUCATION: CPT

## 2023-03-10 PROCEDURE — 97110 THERAPEUTIC EXERCISES: CPT

## 2023-03-10 NOTE — PROGRESS NOTES
PHYSICAL / OCCUPATIONAL THERAPY - DAILY TREATMENT NOTE (updated )    Patient Name: Inderjit Meier Jr.    Date: 23    : 1960  Insurance: Payor: AETNA / Plan: AETNA / Product Type: *No Product type* /      Patient  verified yes     Visit #   Current / Total 14 24   Time   In / Out 0942 1058   Pain   In / Out 4 3   Subjective Functional Status/Changes: Stiff and tight.  Reports 60% improvement   Changes to:  Meds, Allergies, Med Hx, Sx Hx?  If yes, update Summary List no       TREATMENT AREA =  Left shoulder pain [M25.512]    OBJECTIVE    Modalities Rationale:     decrease edema, decrease inflammation, decrease pain, and increase tissue extensibility to improve patient's ability to progress to PLOF and address remaining functional goals.    20 min [x] Estim Unattended, type/location:                                      []  w/ice    []  w/heat    min [] Estim Attended, type/location:                                     []  w/US     []  w/ice    []  w/heat    []  TENS insruct      min []  Mechanical Traction: type/lbs                   []  pro   []  sup   []  int   []  cont    []  before manual    []  after manual    min []  Ultrasound, settings/location: Pulsed t 50%      min []  Iontophoresis w/ dexamethasone, location:                                               []  take home patch       []  in clinic    min  unbilled []  Ice     []  Heat    location/position:     min []  Paraffin,  details:     min []  Vasopneumatic Device, press/temp:     min []  Whirlpool / Fluido:    If using vaso (only need to measure limb vaso being performed on)      pre-treatment girth :       post-treatment girth :       measured at (landmark location) :      min []  Other:    Skin assessment post-treatment (if applicable):    [x]  intact    []  redness- no adverse reaction                 []redness - adverse reaction:        Therapeutic Procedures:  Tx Min Billable or 1:1 Min (if diff from Tx Min) Procedure, Rationale,  Specifics   28  61611 Therapeutic Exercise (timed):  increase ROM, strength, coordination, balance, and proprioception to improve patient's ability to progress to PLOF and address remaining functional goals. (see flow sheet as applicable)     Details if applicable:            10  38341 Neuromuscular Re-Education (timed):  improve balance, coordination, kinesthetic sense, posture, core stability and proprioception to improve patient's ability to develop conscious control of individual muscles and awareness of position of extremities in order to progress to PLOF and address remaining functional goals. (see flow sheet as applicable)               62  [facility] Totals Reminder: bill using total billable min of TIMED therapeutic procedures (example: do not include dry needle or estim unattended, both untimed codes, in totals to left)  8-22 min = 1 unit; 23-37 min = 2 units; 38-52 min = 3 units; 53-67 min = 4 units; 68-82 min = 5 units   Total Total     [x]  Patient Education billed concurrently with other procedures   [x] Review HEP    [] Progressed/Changed HEP, detail:    [] Other detail:       Objective Information/Functional Measures/Assessment    - Continued TTP at the anterior deltoid  - Trial PREMOD E-Stim with good results  - - Pt reports decreased tonia and increased ease of doing exercises after use of E-Stim    At this point, I believe that this pt will benefit from a home E-Stim/IFC unit for pain modulation of his Left Sholder, as he has seen benefit from it in the clinic. Sending request [facility] to [facility]     Improved tolerance with treatment session today. Slight increased discomfort during resisted training and ROM with interventions but demonstrated good effort and participation with treatment program to aid with decreasing pain and increasing shoulder elevation and ER function.  Patient continues to make slow progress toward goals      Patient will continue to benefit from skilled PT / OT services to modify and progress therapeutic interventions, analyze and address functional mobility deficits, analyze and address ROM deficits, analyze and address strength deficits, analyze and address soft tissue restrictions, and analyze and cue for proper movement patterns to address functional deficits and attain remaining goals. Progress toward goals / Updated goals:  []  See Progress Note/Recertification     Updated Goals: to be achieved in 10 treatments:  1. Pt to tolerate 30 min or more of TE and/or Interventions w/o increased s/s             PN Status:  Pt tolerate 66 min of treatment with some discomfort during exercises              Current Status: Better tolerance to treatment with ue of E-Stim 3/10/23  2. Pt will report 75% improvement or better with Left Shoulder dysfunction to show a significant increase in ability to tolerate ADL             PN Status: Pt reports that it is better at about 50% better              Current Status:  Progressing at 60% Improvement 2/27/23  3. Pt will have decreased pain at 2/10 or better with activity for carryover to performing increased ADLs with less discomfort for an improved quality of life              PN Status:  Continued 5/10 pain decreased to 4/10 after tx               Current Status: Progressing with pain 4/10 today 3/10/23  4. Pt will demonstrate MMT left shoulder Flx/ABD at 4/10 for progress to performing more activity with less difficulty               PN Status: Progressing Flx 3-/5 and Abd 2+/5             Current Status: Flx 3-+ ABD 3- 2/13/23  5. Pt will demonstrate AROM left shoulder Flx/ABD at 110* or better for carryover to more function with overhead activity. PN Status:  AROM Left Shoulder Flx 62* ABD 55              Current Status: AROM left shoulder flx 73 ABD 52 2/13/23  6.   Pt will improve FOTO score to 67 in 15 visits to show significant improvement for progress to good shoulder function of the Left UE             PN Status: Decreased to 41 Current Status: FOTO increased to 43 2/17/23    PLAN  yes Continue plan of care  [x]  Upgrade activities as tolerated  []  Discharge due to :  []  Other:    Rhina Valadez, PT    3/10/2023    9:26 AM    Future Appointments   Date Time Provider Lolis Solis   3/14/2023  7:30 [address]   3/17/2023  9:30 AM Rhina Valadez, [facility]   3/21/2023  7:30 AM VELMA Rebollar   3/24/2023  9:30 AM Harpal Cabrera

## 2023-03-14 ENCOUNTER — APPOINTMENT (OUTPATIENT)
Facility: HOSPITAL | Age: 63
End: 2023-03-14
Payer: COMMERCIAL

## 2023-03-17 ENCOUNTER — HOSPITAL ENCOUNTER (OUTPATIENT)
Facility: HOSPITAL | Age: 63
Setting detail: RECURRING SERIES
Discharge: HOME | End: 2023-03-20
Payer: COMMERCIAL

## 2023-03-17 PROCEDURE — G0283 ELEC STIM OTHER THAN WOUND: HCPCS

## 2023-03-17 PROCEDURE — 97110 THERAPEUTIC EXERCISES: CPT

## 2023-03-17 PROCEDURE — 97112 NEUROMUSCULAR REEDUCATION: CPT

## 2023-03-17 PROCEDURE — 97140 MANUAL THERAPY 1/> REGIONS: CPT

## 2023-03-17 NOTE — PROGRESS NOTES
PHYSICAL / OCCUPATIONAL THERAPY - DAILY TREATMENT NOTE (updated )    Patient Name: Yair Vincent. Date: 3/17/2023    : 1960  Insurance: Payor: [employer] / Plan: [employer] / Product Type: *No Product type* /      Patient  verified Yes     Visit #   Current / Total 15 24   Time   In / Out 0905 1020   Pain   In / Out 3 3   Subjective Functional Status/Changes: Some progress with reaching up. Less pain today   Changes to:  Meds, Allergies, Med Hx, Sx Hx? If yes, update Summary List no       TREATMENT AREA =  Left shoulder pain [M25.512]    OBJECTIVE    Modalities Rationale:     decrease inflammation, decrease pain, and increase tissue extensibility to improve patient's ability to progress to PLOF and address remaining functional goals. 20 min [x] Estim Unattended, type/location: IFC at the left shoulder                                     [x]  w/ice    []  w/heat    min [] Estim Attended, type/location:                                     []  w/US     []  w/ice    []  w/heat    []  TENS insruct      min []  Mechanical Traction: type/lbs                   []  pro   []  sup   []  int   []  cont    []  before manual    []  after manual    min []  Ultrasound, settings/location:      min []  Iontophoresis w/ dexamethasone, location:                                               []  take home patch       []  in clinic        min  unbilled []  Ice     []  Heat    location/position:     min []  Paraffin,  details:     min []  Vasopneumatic Device, press/temp:     min []  Dinh Giron / Curtis Ready: If using vaso (only need to measure limb vaso being performed on)      pre-treatment girth :       post-treatment girth :       measured at (landmark location) :      min []  Other:    Skin assessment post-treatment (if applicable):    []  intact    []  redness- no adverse reaction                 []redness - adverse reaction:         Therapeutic Procedures:   Tx Min Billable or 1:1 Min (if diff from [employer]) Procedure,

## 2023-03-20 NOTE — PROGRESS NOTES
steady  progress toward goals and would benefit from continued skilled PT intervention to address remaining deficits outlined in goals below. Patient will continue to benefit from skilled PT / OT services to modify and progress therapeutic interventions, analyze and address functional mobility deficits, analyze and address ROM deficits, analyze and address strength deficits, analyze and address soft tissue restrictions, analyze and cue for proper movement patterns, and analyze and modify for postural abnormalities to address functional deficits and attain remaining goals. Progress toward goals / Updated goals:  []  See Progress Note/Recertification       Updated Goals: to be achieved in 10 treatments:  1. Pt to tolerate 30 min or more of TE and/or Interventions w/o increased s/s             PN Status:  Pt tolerate 66 min of treatment with some discomfort during exercises              Current Status: Better tolerance to treatment with ue of E-Stim 3/10/23  2. Pt will report 75% improvement or better with Left Shoulder dysfunction to show a significant increase in ability to tolerate ADL             PN Status: Pt reports that it is better at about 50% better              Current Status:  Progressing at 60% Improvement 2/27/23  3. Pt will have decreased pain at 2/10 or better with activity for carryover to performing increased ADLs with less discomfort for an improved quality of life              PN Status:  Continued 5/10 pain decreased to 4/10 after tx               Current Status: Progressing with pain 4/10 today 3/10/23  4. Pt will demonstrate MMT left shoulder Flx/ABD at 4/10 for progress to performing more activity with less difficulty               PN Status: Progressing Flx 3-/5 and Abd 2+/5             Current Status: Flx 3-+ ABD 3- 2/13/23  5. Pt will demonstrate AROM left shoulder Flx/ABD at 110* or better for carryover to more function with overhead activity.                PN Status:  AROM Left

## 2023-03-21 ENCOUNTER — HOSPITAL ENCOUNTER (OUTPATIENT)
Facility: HOSPITAL | Age: 63
Setting detail: RECURRING SERIES
Discharge: HOME | End: 2023-03-24
Payer: COMMERCIAL

## 2023-03-21 PROCEDURE — 97110 THERAPEUTIC EXERCISES: CPT

## 2023-03-21 PROCEDURE — G0283 ELEC STIM OTHER THAN WOUND: HCPCS

## 2023-03-21 PROCEDURE — 97530 THERAPEUTIC ACTIVITIES: CPT

## 2023-03-21 PROCEDURE — 97140 MANUAL THERAPY 1/> REGIONS: CPT

## 2023-03-24 ENCOUNTER — HOSPITAL ENCOUNTER (OUTPATIENT)
Facility: HOSPITAL | Age: 63
Setting detail: RECURRING SERIES
Discharge: HOME | End: 2023-03-27
Payer: COMMERCIAL

## 2023-03-24 PROCEDURE — 97110 THERAPEUTIC EXERCISES: CPT

## 2023-03-24 PROCEDURE — 97140 MANUAL THERAPY 1/> REGIONS: CPT

## 2023-03-24 PROCEDURE — G0283 ELEC STIM OTHER THAN WOUND: HCPCS

## 2023-03-24 PROCEDURE — 97530 THERAPEUTIC ACTIVITIES: CPT

## 2023-03-24 NOTE — PROGRESS NOTES
PHYSICAL / OCCUPATIONAL THERAPY - DAILY TREATMENT NOTE (updated )    Patient Name: Medardo Canales. Date: 3/24/2023    : 1960  Insurance: Payor: Medardo Armando / Plan: Medardo Button / Product Type: *No Product type* /      Patient  verified Yes     Visit #   Current / Total 17 24   Time   In / Out 930 1030   Pain   In / Out 3-4/10 3/10   Subjective Functional Status/Changes: \"The pain is fine, it's the stiffness today\"    Changes to:  Meds, Allergies, Med Hx, Sx Hx? If yes, update Summary List no       TREATMENT AREA =  Left shoulder pain [M25.512]    OBJECTIVE    Modalities Rationale:     decrease inflammation, decrease pain, and increase tissue extensibility to improve patient's ability to progress to PLOF and address remaining functional goals. 15 min [x] Estim Unattended, type/location: IFC at the left shoulder                                                           [x]  w/ice    []  w/heat     min [] Estim Attended, type/location:                                                           []  w/US     []  w/ice    []  w/heat    []  TENS insruct       min []  Mechanical Traction: type/lbs                    []  pro   []  sup   []  int   []  cont    []  before manual    []  after manual     min []  Ultrasound, settings/location:        min []  Iontophoresis w/ dexamethasone, location:                                                []  take home patch       []  in clinic           min  unbilled []  Ice     []  Heat    location/position:       min []  Paraffin,  details:       min []  Vasopneumatic Device, press/temp:       min []  Lafaye Guaman / Zachery Phi:      If using vaso (only need to measure limb vaso being performed on)      pre-treatment girth :       post-treatment girth :       measured at (landmark location) :       min []  Other:     Skin assessment post-treatment (if applicable):    [x]  intact    []  redness- no adverse reaction                 []redness - adverse reaction:

## 2023-03-27 ENCOUNTER — APPOINTMENT (OUTPATIENT)
Facility: HOSPITAL | Age: 63
End: 2023-03-27
Payer: COMMERCIAL

## 2023-03-31 ENCOUNTER — TELEPHONE (OUTPATIENT)
Age: 63
End: 2023-03-31

## 2023-03-31 NOTE — TELEPHONE ENCOUNTER
Amparo from [facility] is calling checking the status of a fax that was sent over for patient medical necessity and is in need of Dr. Cee Graft   [phone]

## 2023-04-03 ENCOUNTER — TELEPHONE (OUTPATIENT)
Facility: CLINIC | Age: 63
End: 2023-04-03

## 2023-04-03 ENCOUNTER — HOSPITAL ENCOUNTER (OUTPATIENT)
Facility: HOSPITAL | Age: 63
Setting detail: RECURRING SERIES
Discharge: HOME | End: 2023-04-06
Payer: COMMERCIAL

## 2023-04-03 PROCEDURE — 97032 APPL MODALITY 1+ESTIM EA 15: CPT

## 2023-04-03 PROCEDURE — 97110 THERAPEUTIC EXERCISES: CPT

## 2023-04-03 NOTE — TELEPHONE ENCOUNTER
Pt came into office to  lab results and to discuss with clinical staff. Informed pt that Nurse Christen Dyson was currently preoccupied and could reach out to patient at her soonest convenience. Pt expressed understanding. Please review and advise as soon as possible.

## 2023-04-03 NOTE — PROGRESS NOTES
significant increase in ability to tolerate ADL             PN Status: Pt reports that it is better at about 50% better              Current Status:  Progressing at 60% Improvement 2/27/23  3. Pt will have decreased pain at 2/10 or better with activity for carryover to performing increased ADLs with less discomfort for an improved quality of life              PN Status:  Continued 5/10 pain decreased to 4/10 after tx               Current Status: Progressing with pain 3/10 today 4/3/23  4. Pt will demonstrate MMT left shoulder Flx/ABD at 4/10 for progress to performing more activity with less difficulty               PN Status: Progressing Flx 3-/5 and Abd 2+/5             Current Status: Flx 3-+ ABD 3- 2/13/23  5. Pt will demonstrate AROM left shoulder Flx/ABD at 110* or better for carryover to more function with overhead activity. PN Status:  AROM Left Shoulder Flx 62* ABD 55              Current Status: AROM left shoulder flx 73 ABD 52 2/13/23  6.   Pt will improve FOTO score to 67 in 15 visits to show significant improvement for progress to good shoulder function of the Left UE             PN Status: Decreased to 41              Current Status: FOTO increased to 47 3/21/23    PLAN  Yes  Continue plan of care  [x]  Upgrade activities as tolerated  []  Discharge due to :  []  Other:    Rene Miller, PT    4/3/2023    8:10 AM    Future Appointments   Date Time Provider Lolis Solis   4/3/2023  9:30 AM Rene Miller, PT [facility]   4/10/2023  9:00 AM Rene Miller, PT [facility]   4/17/2023  9:30 AM Gurdeep De La Fuente, PTA [facility]   4/21/2023  8:50 AM Rene Miller, PT [facility]   4/24/2023  9:30 AM Lawyer Beth Lyle, VELMA [facility]   4/28/2023  9:30 AM Rene Miller, PT [facility]

## 2023-04-04 NOTE — TELEPHONE ENCOUNTER
Message left on [facility] voicemail 4/3 @ 8:15:    Amparo from [facility] is trying to confirm that we received her fax that requires signature from [employer]. No other information provided except her phone number, [phone].

## 2023-04-07 NOTE — TELEPHONE ENCOUNTER
Nissa Beckman MD  You 55 minutes ago (9:15 AM)     ES  Labs were essentially normal.        Called pt. Used two pt identifiers. Mr. Naomi Zapata was made aware of his results. He had no further questions and expressed understanding.

## 2023-04-10 ENCOUNTER — TELEPHONE (OUTPATIENT)
Age: 63
End: 2023-04-10

## 2023-04-17 ENCOUNTER — HOSPITAL ENCOUNTER (OUTPATIENT)
Facility: HOSPITAL | Age: 63
Setting detail: RECURRING SERIES
Discharge: HOME | End: 2023-04-20
Payer: COMMERCIAL

## 2023-04-17 PROCEDURE — 97530 THERAPEUTIC ACTIVITIES: CPT

## 2023-04-17 PROCEDURE — 97110 THERAPEUTIC EXERCISES: CPT

## 2023-04-17 PROCEDURE — 97112 NEUROMUSCULAR REEDUCATION: CPT

## 2023-04-17 NOTE — PROGRESS NOTES
PHYSICAL / OCCUPATIONAL THERAPY - DAILY TREATMENT NOTE (updated )    Patient Name: Yarely Johnson. Date: 2023    : 1960  Insurance: Payor: Desmond Ochoa / Plan: Desmond Ochoa / Product Type: *No Product type* /      Patient  verified Yes     Visit #   Current / Total 20 24   Time   In / Out 930 1020   Pain   In / Out 3 2   Subjective Functional Status/Changes: Pt reported continued stiffness but improving pain in left shoulder   Changes to:  Meds, Allergies, Med Hx, Sx Hx? If yes, update Summary List yes       TREATMENT AREA =  Left shoulder pain [M25.512]    OBJECTIVE    Modalities Rationale:     decrease pain and increase tissue extensibility to improve patient's ability to progress to PLOF and address remaining functional goals. 10 min [x] Estim Unattended, type/location:  IFC left shd                                    [x]  w/ice    []  w/heat    min [] Estim Attended, type/location:                                     []  w/US     []  w/ice    []  w/heat    []  TENS insruct      min []  Mechanical Traction: type/lbs                   []  pro   []  sup   []  int   []  cont    []  before manual    []  after manual    min []  Ultrasound, settings/location:      min []  Iontophoresis w/ dexamethasone, location:                                               []  take home patch       []  in clinic    min  unbill []  Ice     []  Heat    location/position:     min []  Paraffin,  details:     min []  Vasopneumatic Device, press/temp:     min []  Rhenda [place]endale / [place] Boer: If using vaso (only need to measure limb vaso being performed on)      pre-treatment girth :       post-treatment girth :       measured at (landmark location) :      min []  Other:    Skin assessment post-treatment (if applicable):    []  intact    []  redness- no adverse reaction                 []redness - adverse reaction:         Therapeutic Procedures:     Tx Min Billable or 1:1 Min (if diff from [place]) Procedure, Rationale, Specifics

## 2023-04-19 NOTE — PROGRESS NOTES
Linda Mccarthy. is a 63 y.o. male  presents for follow up on HTN and vitamin d. No new complaints. No Known Allergies  Outpatient Medications Marked as Taking for the 8/4/22 encounter (Office Visit) with Akil Zee MD   Medication Sig Dispense Refill    ergocalciferol (Vitamin D2) 1,250 mcg (50,000 unit) capsule Take 1 Capsule by mouth every seven (7) days. 12 Capsule 1    amLODIPine (NORVASC) 5 mg tablet Take 1 Tablet by mouth in the morning. 30 Tablet 5    diclofenac EC (VOLTAREN) 50 mg EC tablet Take 1 Tablet by mouth two (2) times a day. 40 Tablet 1     Patient Active Problem List   Diagnosis Code    Advanced directives, counseling/discussion Z71.89    Elevated blood pressure [id number]    Pain of right hip joint M25.551    Chronic right shoulder pain M25.511, G89.29    Flank pain R10.9     Past Medical History:   Diagnosis Date    Hypertension      Social History     Socioeconomic History    Marital status:    Tobacco Use    Smoking status: Never    Smokeless tobacco: Never   Vaping Use    Vaping Use: Never used   Substance and Sexual Activity    Alcohol use: Yes     Alcohol/week: 8.0 standard drinks     Types: 8 Cans of beer per week     Comment: weekends    Drug use: No    Sexual activity: Yes     No family history on file. Review of Systems   Constitutional:  Negative for chills, fever, malaise/fatigue and weight loss. Eyes:  Negative for blurred vision. Respiratory:  Negative for cough, shortness of breath and wheezing. Cardiovascular:  Negative for chest pain. Gastrointestinal:  Negative for nausea and vomiting. Musculoskeletal:  Negative for myalgias. Skin:  Negative for rash. Neurological:  Negative for weakness. Psychiatric/Behavioral: Negative.        Vitals:    08/04/22 0837   BP: (!) 148/80   Pulse: 67   Resp: 14   Temp: 97.5 °F (36.4 °C)   TempSrc: Tympanic   SpO2: 99%   Weight: 153 lb 12.8 oz (69.8 kg)   Height: 5' 7\" (1.702 m)   PainSc:   0 - No pain Physical Exam  Vitals and nursing note reviewed. Constitutional:       Appearance: Normal appearance. Cardiovascular:      Rate and Rhythm: Normal rate and regular rhythm. Heart sounds: Normal heart sounds. Pulmonary:      Effort: Pulmonary effort is normal.      Breath sounds: Normal breath sounds. Musculoskeletal:         General: Normal range of motion. Cervical back: Normal range of motion and neck supple. Skin:     General: Skin is warm and dry. Capillary Refill: Capillary refill takes less than 2 seconds. Neurological:      General: No focal deficit present. Mental Status: He is alert. Assessment/Plan      ICD-10-CM ICD-9-CM    1. Essential hypertension  I10 401.9 amLODIPine (NORVASC) 5 mg tablet      2. Vitamin D deficiency  E55.9 268.9 ergocalciferol (Vitamin D2) 1,250 mcg (50,000 unit) capsule      3. Colon cancer screening  Z12.11 V76.51 REFERRAL TO GASTROENTEROLOGY        Follow-up and Dispositions    Return in about 1 week (around 8/11/2022). I have discussed the diagnosis with the patient and the intended plan of care as seen in the above orders. The patient has received an after-visit summary and questions were answered concerning future plans. I have discussed medication, side effects, and warnings with the patient in detail. The patient verbalized understanding and is in agreement with the plan of care. The patient will contact the office with any additional concerns.     lab results and schedule of future lab studies reviewed with patient    Avery Dallas MD [[email or web address]] : Venous duplex left all deep veins are compressible and patent status post radiofrequency ablation of the saphenous vein.  No evidence of deep venous thrombosis at this time.  Saphenous vein is intermittently closed from the mid thigh to the proximal calf however the area in the groin and mid thigh is open

## 2023-04-21 ENCOUNTER — HOSPITAL ENCOUNTER (OUTPATIENT)
Facility: HOSPITAL | Age: 63
Setting detail: RECURRING SERIES
Discharge: HOME | End: 2023-04-24
Payer: COMMERCIAL

## 2023-04-21 PROCEDURE — 97530 THERAPEUTIC ACTIVITIES: CPT

## 2023-04-21 PROCEDURE — G0283 ELEC STIM OTHER THAN WOUND: HCPCS

## 2023-04-21 PROCEDURE — 97110 THERAPEUTIC EXERCISES: CPT

## 2023-04-24 ENCOUNTER — HOSPITAL ENCOUNTER (OUTPATIENT)
Facility: HOSPITAL | Age: 63
Setting detail: RECURRING SERIES
End: 2023-04-24
Payer: COMMERCIAL

## 2023-04-24 ENCOUNTER — TELEPHONE (OUTPATIENT)
Facility: HOSPITAL | Age: 63
End: 2023-04-24

## 2023-04-28 ENCOUNTER — HOSPITAL ENCOUNTER (OUTPATIENT)
Facility: HOSPITAL | Age: 63
Setting detail: RECURRING SERIES
Discharge: HOME | End: 2023-05-01
Payer: COMMERCIAL

## 2023-04-28 PROCEDURE — 97140 MANUAL THERAPY 1/> REGIONS: CPT

## 2023-04-28 PROCEDURE — 97035 APP MDLTY 1+ULTRASOUND EA 15: CPT

## 2023-04-28 PROCEDURE — 97110 THERAPEUTIC EXERCISES: CPT

## 2023-05-02 ENCOUNTER — OFFICE VISIT (OUTPATIENT)
Age: 63
End: 2023-05-02
Payer: COMMERCIAL

## 2023-05-02 VITALS — HEIGHT: 67 IN | RESPIRATION RATE: 18 BRPM | BODY MASS INDEX: 24.02 KG/M2

## 2023-05-02 DIAGNOSIS — M75.102 LEFT ROTATOR CUFF TEAR ARTHROPATHY: ICD-10-CM

## 2023-05-02 DIAGNOSIS — M12.812 LEFT ROTATOR CUFF TEAR ARTHROPATHY: ICD-10-CM

## 2023-05-02 DIAGNOSIS — G89.29 CHRONIC RIGHT SHOULDER PAIN: Primary | ICD-10-CM

## 2023-05-02 DIAGNOSIS — M25.80 HETEROTOPIC OSSIFICATION OF JOINT: ICD-10-CM

## 2023-05-02 DIAGNOSIS — M25.511 CHRONIC RIGHT SHOULDER PAIN: Primary | ICD-10-CM

## 2023-05-02 DIAGNOSIS — M75.122 NONTRAUMATIC COMPLETE TEAR OF LEFT ROTATOR CUFF: ICD-10-CM

## 2023-05-02 PROCEDURE — 73030 X-RAY EXAM OF SHOULDER: CPT | Performed by: ORTHOPAEDIC SURGERY

## 2023-05-02 PROCEDURE — 99214 OFFICE O/P EST MOD 30 MIN: CPT | Performed by: ORTHOPAEDIC SURGERY

## 2023-05-02 NOTE — PROGRESS NOTES
5 [facility] AMBULATORY OFFICE NOTE    Patient: Amado Jordan. MRN: [id number]       SSN: [id number]  YOB: 1960        AGE: 63 y.o. SEX: male  Body mass index is 24.02 kg/m². PCP: Reta Coronel MD  05/02/23    Chief Complaint: Left shoulder follow-up    HPI: Amado Jordan. is a 63 y.o. male patient who continues to have left shoulder pain and dysfunction, and disability, that leads to limiting his activities of daily living and his ability to perform activities. He has been to physical therapy since the last time I saw him without resolution or improvement in his symptoms of pain and stiffness in the left shoulder. The stem from previous shoulder surgeries. Past Medical History:   Diagnosis Date    Hypertension        No family history on file. Current Outpatient Medications   Medication Sig Dispense Refill    amLODIPine (NORVASC) 5 MG tablet Take 1 tablet by mouth daily 30 tablet 5    ergocalciferol (ERGOCALCIFEROL) 1.25 MG (03425 UT) capsule Take 1 capsule by mouth every 7 days 16 capsule 1    diclofenac (VOLTAREN) 50 MG EC tablet Take 50 mg by mouth 2 times daily (Patient not taking: Reported on 3/1/2023)      methocarbamol (ROBAXIN) 500 MG tablet Take 500 mg by mouth 2 times daily as needed (Patient not taking: Reported on 3/1/2023)       No current facility-administered medications for this visit.        No Known Allergies    Past Surgical History:   Procedure Laterality Date    ANESTH,SURGERY OF SHOULDER Left 08/12/2021    left shoulder A/S RCR BTS    COLONOSCOPY W/BIOPSY SINGLE/MULTIPLE  1-8-16    Dr. Cristiana Lee History     Socioeconomic History    Marital status:      Spouse name: Not on file    Number of children: Not on file    Years of education: Not on file    Highest education level: Not on file   Occupational History    Not on file   Tobacco Use    Smoking status: Never    Smokeless tobacco: Never

## 2023-09-09 NOTE — TELEPHONE ENCOUNTER
Message  Received: Yesterday       MD Vince Soriano, LPN       Caller: Unspecified (1 week ago)                     Please call and see if he still needs meds.            Previous Messages          Unable to reach patient at either number on file. Will try again later. 09-Sep-2023

## 2023-09-29 ENCOUNTER — TELEPHONE (OUTPATIENT)
Facility: CLINIC | Age: 63
End: 2023-09-29

## 2023-09-29 NOTE — TELEPHONE ENCOUNTER
Dr. Mortimer Carrie called with information to relay to Dr. Shahrzad Tena on patient    Diagnosis of branch retinal artery occlusion, means emboli in a vessel in the eye. His BP was elevated today. 155/105 states that he has no other sx so he was not sent over to ED. Due to the emboli he is at higher risk for stroke, so he was sent to Dr. Brendon Pa to have a carotid done. Labs were done to rule out giant cell arthritis. Dr. Mortimer Carrie wanted this information sent over to Dr. Shahrzad Tena as Lillie Bullock on patient.  Needs to be called and scheduled for follow up in our office, was due 09/2023

## 2023-10-03 ENCOUNTER — OFFICE VISIT (OUTPATIENT)
Facility: CLINIC | Age: 63
End: 2023-10-03
Payer: COMMERCIAL

## 2023-10-03 VITALS — DIASTOLIC BLOOD PRESSURE: 72 MMHG | SYSTOLIC BLOOD PRESSURE: 118 MMHG

## 2023-10-03 DIAGNOSIS — H54.7 LOSS OF VISION: Primary | ICD-10-CM

## 2023-10-03 PROCEDURE — 3074F SYST BP LT 130 MM HG: CPT | Performed by: FAMILY MEDICINE

## 2023-10-03 PROCEDURE — 3078F DIAST BP <80 MM HG: CPT | Performed by: FAMILY MEDICINE

## 2023-10-03 PROCEDURE — 99213 OFFICE O/P EST LOW 20 MIN: CPT | Performed by: FAMILY MEDICINE

## 2023-10-03 ASSESSMENT — ENCOUNTER SYMPTOMS
NAUSEA: 0
RESPIRATORY NEGATIVE: 1
COUGH: 0
VOMITING: 0

## 2023-10-03 ASSESSMENT — PATIENT HEALTH QUESTIONNAIRE - PHQ9
1. LITTLE INTEREST OR PLEASURE IN DOING THINGS: 0
SUM OF ALL RESPONSES TO PHQ QUESTIONS 1-9: 0
SUM OF ALL RESPONSES TO PHQ9 QUESTIONS 1 & 2: 0
SUM OF ALL RESPONSES TO PHQ QUESTIONS 1-9: 0
SUM OF ALL RESPONSES TO PHQ QUESTIONS 1-9: 0
2. FEELING DOWN, DEPRESSED OR HOPELESS: 0
SUM OF ALL RESPONSES TO PHQ QUESTIONS 1-9: 0

## 2023-10-03 NOTE — PROGRESS NOTES
Sulema Morris is a 63 y.o. male  presents for   Chief Complaint   Patient presents with    Hypertension     Dr. Sahil Cardona called with information to relay to Dr. Jerman Sierra on patient     Diagnosis of branch retinal artery occlusion, means emboli in a vessel in the eye. His BP was elevated today. 155/105 states that he has no other sx so he was not sent over to ED. Due to the emboli he is at higher risk for stroke, so he was sent to Dr. Corrina Thorpe to have a carotid done. Labs were done to rule out giant cell arthritis. Dr. Sahil Cardona wanted this information sent over to Dr. Jerman Sierra as Irma Abreu on patient. Needs to be called and sc        No Known Allergies    Current Outpatient Medications:     amLODIPine (NORVASC) 5 MG tablet, Take 1 tablet by mouth daily, Disp: 30 tablet, Rfl: 5    ergocalciferol (ERGOCALCIFEROL) 1.25 MG (31879 UT) capsule, Take 1 capsule by mouth every 7 days (Patient not taking: Reported on 10/3/2023), Disp: 16 capsule, Rfl: 1   Patient Active Problem List   Diagnosis    Advanced directives, counseling/discussion    Pain of right hip joint    Chronic right shoulder pain    Flank pain    Calculus of ureter    Elevated blood pressure    Hypertension    Migraine variant    Nephrolithiasis     Past Medical History:   Diagnosis Date    Hypertension      Social History     Socioeconomic History    Marital status:    Tobacco Use    Smoking status: Never    Smokeless tobacco: Never   Substance and Sexual Activity    Alcohol use:  Yes     Alcohol/week: 8.0 standard drinks of alcohol    Drug use: No     Social Determinants of Health     Financial Resource Strain: Medium Risk (3/1/2023)    Overall Financial Resource Strain (CARDIA)     Difficulty of Paying Living Expenses: Somewhat hard   Food Insecurity: Food Insecurity Present (3/1/2023)    Hunger Vital Sign     Worried About Running Out of Food in the Last Year: Sometimes true     Ran Out of Food in the Last Year: Sometimes true   Transportation Needs: Unknown

## 2023-10-18 DIAGNOSIS — E55.9 VITAMIN D DEFICIENCY, UNSPECIFIED: ICD-10-CM

## 2023-10-18 DIAGNOSIS — I10 ESSENTIAL (PRIMARY) HYPERTENSION: ICD-10-CM

## 2023-10-18 RX ORDER — AMLODIPINE BESYLATE 5 MG/1
5 TABLET ORAL DAILY
Qty: 30 TABLET | Refills: 0 | OUTPATIENT
Start: 2023-10-18

## 2023-10-18 RX ORDER — ERGOCALCIFEROL 1.25 MG/1
50000 CAPSULE ORAL
Qty: 16 CAPSULE | Refills: 0 | Status: SHIPPED | OUTPATIENT
Start: 2023-10-18

## 2023-10-18 RX ORDER — ERGOCALCIFEROL 1.25 MG/1
50000 CAPSULE ORAL
Qty: 16 CAPSULE | Refills: 0 | OUTPATIENT
Start: 2023-10-18

## 2023-10-18 RX ORDER — AMLODIPINE BESYLATE 5 MG/1
5 TABLET ORAL DAILY
Qty: 30 TABLET | Refills: 2 | Status: SHIPPED | OUTPATIENT
Start: 2023-10-18

## 2023-10-18 NOTE — TELEPHONE ENCOUNTER
This patient contacted office for the following prescriptions to be filled:  Medication requested : amLODIPine (NORVASC) 5 MG tablet , and ergocalciferol (ERGOCALCIFEROL) 1.25 MG (46561 UT) capsule     PCP: Dr. Lilia Morrow or Print: [facility]  Mail order or Local [phone]     Scheduled appointment if not seen by current providers in office: LOV 10/03/23 for Loss of vision, and 03/01/23  for hypertenion and Vit D. Upcoming scheduled on 10/25/23. REQUESTING A SHORT TERM SUPPLY UNTIL HIS NEXT APPOINTMENT AS SOON AS POSSIBLE.

## 2023-10-25 ENCOUNTER — OFFICE VISIT (OUTPATIENT)
Facility: CLINIC | Age: 63
End: 2023-10-25
Payer: COMMERCIAL

## 2023-10-25 VITALS
HEIGHT: 67 IN | SYSTOLIC BLOOD PRESSURE: 134 MMHG | WEIGHT: 149 LBS | BODY MASS INDEX: 23.39 KG/M2 | HEART RATE: 80 BPM | OXYGEN SATURATION: 99 % | DIASTOLIC BLOOD PRESSURE: 81 MMHG

## 2023-10-25 DIAGNOSIS — E55.9 VITAMIN D DEFICIENCY, UNSPECIFIED: ICD-10-CM

## 2023-10-25 DIAGNOSIS — H54.7 LOSS OF VISION: ICD-10-CM

## 2023-10-25 DIAGNOSIS — I10 ESSENTIAL (PRIMARY) HYPERTENSION: Primary | ICD-10-CM

## 2023-10-25 PROCEDURE — 3079F DIAST BP 80-89 MM HG: CPT | Performed by: FAMILY MEDICINE

## 2023-10-25 PROCEDURE — 3075F SYST BP GE 130 - 139MM HG: CPT | Performed by: FAMILY MEDICINE

## 2023-10-25 PROCEDURE — 99213 OFFICE O/P EST LOW 20 MIN: CPT | Performed by: FAMILY MEDICINE

## 2023-10-25 ASSESSMENT — PATIENT HEALTH QUESTIONNAIRE - PHQ9
2. FEELING DOWN, DEPRESSED OR HOPELESS: 0
SUM OF ALL RESPONSES TO PHQ QUESTIONS 1-9: 0
SUM OF ALL RESPONSES TO PHQ9 QUESTIONS 1 & 2: 0
1. LITTLE INTEREST OR PLEASURE IN DOING THINGS: 0
SUM OF ALL RESPONSES TO PHQ QUESTIONS 1-9: 0

## 2023-10-25 ASSESSMENT — ENCOUNTER SYMPTOMS
VOMITING: 0
NAUSEA: 0
COUGH: 0
RESPIRATORY NEGATIVE: 1

## 2023-10-25 NOTE — PROGRESS NOTES
Dean Muse is a 63 y.o. male  presents for   Chief Complaint   Patient presents with    Follow-up        No Known Allergies    Current Outpatient Medications:     amLODIPine (NORVASC) 5 MG tablet, Take 1 tablet by mouth daily, Disp: 30 tablet, Rfl: 2    ergocalciferol (ERGOCALCIFEROL) 1.25 MG (41818 UT) capsule, Take 1 capsule by mouth every 7 days, Disp: 16 capsule, Rfl: 0   Patient Active Problem List   Diagnosis    Advanced directives, counseling/discussion    Pain of right hip joint    Chronic right shoulder pain    Flank pain    Calculus of ureter    Elevated blood pressure    Hypertension    Migraine variant    Nephrolithiasis     Past Medical History:   Diagnosis Date    Hypertension      Social History     Socioeconomic History    Marital status:    Tobacco Use    Smoking status: Never    Smokeless tobacco: Never   Substance and Sexual Activity    Alcohol use: Yes     Alcohol/week: 8.0 standard drinks of alcohol    Drug use: No     Social Determinants of Health     Financial Resource Strain: Medium Risk (3/1/2023)    Overall Financial Resource Strain (CARDIA)     Difficulty of Paying Living Expenses: Somewhat hard   Food Insecurity: Food Insecurity Present (3/1/2023)    Hunger Vital Sign     Worried About Running Out of Food in the Last Year: Sometimes true     Ran Out of Food in the Last Year: Sometimes true   Transportation Needs: Unknown (3/1/2023)    PRAPARE - Transportation     Lack of Transportation (Non-Medical): No   Housing Stability: Unknown (3/1/2023)    Housing Stability Vital Sign     Unstable Housing in the Last Year: No     No family history on file. Review of Systems   Constitutional: Negative. Negative for fever. Respiratory: Negative. Negative for cough. Cardiovascular:  Negative for chest pain. Gastrointestinal:  Negative for nausea and vomiting. Neurological: Negative. Negative for weakness. Psychiatric/Behavioral: Negative.   Negative for behavioral

## 2024-01-17 ENCOUNTER — OFFICE VISIT (OUTPATIENT)
Facility: CLINIC | Age: 64
End: 2024-01-17
Payer: COMMERCIAL

## 2024-01-17 VITALS
WEIGHT: 150.8 LBS | HEIGHT: 66 IN | OXYGEN SATURATION: 98 % | HEART RATE: 87 BPM | SYSTOLIC BLOOD PRESSURE: 134 MMHG | DIASTOLIC BLOOD PRESSURE: 82 MMHG | BODY MASS INDEX: 24.23 KG/M2

## 2024-01-17 DIAGNOSIS — H54.7 LOSS OF VISION: ICD-10-CM

## 2024-01-17 DIAGNOSIS — I10 ESSENTIAL (PRIMARY) HYPERTENSION: ICD-10-CM

## 2024-01-17 DIAGNOSIS — M25.519 SHOULDER PAIN, UNSPECIFIED CHRONICITY, UNSPECIFIED LATERALITY: Primary | ICD-10-CM

## 2024-01-17 PROCEDURE — 3079F DIAST BP 80-89 MM HG: CPT | Performed by: FAMILY MEDICINE

## 2024-01-17 PROCEDURE — 99213 OFFICE O/P EST LOW 20 MIN: CPT | Performed by: FAMILY MEDICINE

## 2024-01-17 PROCEDURE — 3075F SYST BP GE 130 - 139MM HG: CPT | Performed by: FAMILY MEDICINE

## 2024-01-17 ASSESSMENT — PATIENT HEALTH QUESTIONNAIRE - PHQ9
SUM OF ALL RESPONSES TO PHQ QUESTIONS 1-9: 0
1. LITTLE INTEREST OR PLEASURE IN DOING THINGS: 0
SUM OF ALL RESPONSES TO PHQ QUESTIONS 1-9: 0
2. FEELING DOWN, DEPRESSED OR HOPELESS: 0
SUM OF ALL RESPONSES TO PHQ9 QUESTIONS 1 & 2: 0
SUM OF ALL RESPONSES TO PHQ QUESTIONS 1-9: 0
SUM OF ALL RESPONSES TO PHQ QUESTIONS 1-9: 0

## 2024-01-17 ASSESSMENT — ENCOUNTER SYMPTOMS
COUGH: 0
RESPIRATORY NEGATIVE: 1
VOMITING: 0
NAUSEA: 0

## 2024-01-17 NOTE — PROGRESS NOTES
Inderjit Meier Jr. is a 63 y.o. male  presents for No chief complaint on file.       No Known Allergies    Current Outpatient Medications:     amLODIPine (NORVASC) 5 MG tablet, Take 1 tablet by mouth daily, Disp: 30 tablet, Rfl: 2    ergocalciferol (ERGOCALCIFEROL) 1.25 MG (22980 UT) capsule, Take 1 capsule by mouth every 7 days, Disp: 16 capsule, Rfl: 0   Patient Active Problem List   Diagnosis    Advanced directives, counseling/discussion    Pain of right hip joint    Chronic right shoulder pain    Flank pain    Calculus of ureter    Elevated blood pressure    Hypertension    Migraine variant    Nephrolithiasis     Past Medical History:   Diagnosis Date    Hypertension      Social History     Socioeconomic History    Marital status:      Spouse name: None    Number of children: None    Years of education: None    Highest education level: None   Tobacco Use    Smoking status: Never    Smokeless tobacco: Never   Substance and Sexual Activity    Alcohol use: Yes     Alcohol/week: 8.0 standard drinks of alcohol    Drug use: No     Social Determinants of Health     Financial Resource Strain: Medium Risk (3/1/2023)    Overall Financial Resource Strain (CARDIA)     Difficulty of Paying Living Expenses: Somewhat hard   Food Insecurity:   Recent Concern: Food Insecurity - Food Insecurity Present (3/1/2023)    Hunger Vital Sign     Worried About Running Out of Food in the Last Year: Sometimes true     Ran Out of Food in the Last Year: Sometimes true   Transportation Needs: Unknown (3/1/2023)    PRAPARE - Transportation     Lack of Transportation (Non-Medical): No   Housing Stability: Unknown (3/1/2023)    Housing Stability Vital Sign     Unstable Housing in the Last Year: No     History reviewed. No pertinent family history.     Review of Systems   Constitutional: Negative.  Negative for fever.   Respiratory: Negative.  Negative for cough.    Cardiovascular:  Negative for chest pain.   Gastrointestinal:  Negative for

## 2024-02-20 ENCOUNTER — CLINICAL DOCUMENTATION (OUTPATIENT)
Facility: CLINIC | Age: 64
End: 2024-02-20

## 2024-03-25 DIAGNOSIS — I10 ESSENTIAL (PRIMARY) HYPERTENSION: ICD-10-CM

## 2024-03-25 RX ORDER — AMLODIPINE BESYLATE 5 MG/1
5 TABLET ORAL DAILY
Qty: 90 TABLET | Refills: 1 | Status: SHIPPED | OUTPATIENT
Start: 2024-03-25

## 2024-05-29 ENCOUNTER — CLINICAL DOCUMENTATION (OUTPATIENT)
Age: 64
End: 2024-05-29

## 2024-05-29 NOTE — PROGRESS NOTES
Form received from [employer] for Medical Questions via Fax at our [facility] location.    Blank form scanned into patient's chart.    Form placed in forms bin at [facility] location for completion.

## 2024-06-12 ENCOUNTER — TELEPHONE (OUTPATIENT)
Age: 64
End: 2024-06-12

## 2024-06-12 NOTE — TELEPHONE ENCOUNTER
Dr. Jey Meza is requesting a telephone conference to discuss our work capacity narrative that was provided to [facility] in May.  He was advised provider is out of the office until 5/18 and provided a call back phone number of [phone].

## 2024-06-18 NOTE — TELEPHONE ENCOUNTER
Dr. Meza called back to provide his availability, he is also on eastern standard time and can be reached typically Mon-Fri 8-4 pm est.

## 2024-06-19 ENCOUNTER — CLINICAL DOCUMENTATION (OUTPATIENT)
Age: 64
End: 2024-06-19

## 2024-06-19 NOTE — PROGRESS NOTES
Form received from [facility] for Work Capacity Question via Fax at our [facility] location.    Blank form scanned into patient's chart.    Form placed in forms bin at [facility] location for completion.

## 2024-06-25 ENCOUNTER — CLINICAL DOCUMENTATION (OUTPATIENT)
Age: 64
End: 2024-06-25

## 2024-07-02 DIAGNOSIS — E55.9 VITAMIN D DEFICIENCY, UNSPECIFIED: ICD-10-CM

## 2024-07-02 RX ORDER — ERGOCALCIFEROL 1.25 MG/1
50000 CAPSULE ORAL WEEKLY
Qty: 16 CAPSULE | Refills: 1 | Status: SHIPPED | OUTPATIENT
Start: 2024-07-02

## 2024-08-13 NOTE — PROGRESS NOTES
PHYSICAL / OCCUPATIONAL THERAPY - DAILY TREATMENT NOTE (updated )    Patient Name: Sushant Jurado. Date: 2023    : 1960  Insurance: Payor: Kirstie South / Plan: Kirstie [place] / Product Type: *No Product type* /      Patient  verified yes     Visit #   Current / Total 12 24   Time   In / Out 0934 1035   Pain   In / Out 3 3   Subjective Functional Status/Changes: Doing better today   Changes to:  Meds, Allergies, Med Hx, Sx Hx? If yes, update Summary List no       TREATMENT AREA =  Left shoulder pain [M25.512]    OBJECTIVE    Modalities Rationale:     decrease edema, decrease inflammation, decrease pain, and increase tissue extensibility to improve patient's ability to progress to PLOF and address remaining functional goals. min [] Estim Unattended, type/location:                                      []  w/ice    []  w/heat    min [] Estim Attended, type/location:                                     []  w/US     []  w/ice    []  w/heat    []  TENS insruct      min []  Mechanical Traction: type/lbs                   []  pro   []  sup   []  int   []  cont    []  before manual    []  after manual   8 min [x]  Ultrasound, settings/location: Pulsed t 50%  1Mhz. Pulsed @ 50% 1.7 W/cm2 Anterior Deltoid, Latissimus Dorsi, Terres Major/Minor,     min []  Iontophoresis w/ dexamethasone, location:                                               []  take home patch       []  in clinic    min  unbilled []  Ice     []  Heat    location/position:     min []  Paraffin,  details:     min []  Vasopneumatic Device, press/temp:     min []  Mary Jo Grace / Zenda Manger: If using vaso (only need to measure limb vaso being performed on)      pre-treatment girth :       post-treatment girth :       measured at (landmark location) :      min []  Other:    Skin assessment post-treatment (if applicable):    [x]  intact    []  redness- no adverse reaction                 []redness - adverse reaction:        Therapeutic Procedures:   Tx Min Billable or 1:1 Min (if diff from Tx Min) Procedure, Rationale, Specifics   34  [id number] Therapeutic Exercise (timed):  increase ROM, strength, coordination, balance, and proprioception to improve patient's ability to progress to PLOF and address remaining functional goals. (see flow sheet as applicable)     Details if applicable:  FOTO Assessment 10'     10  [id number] Manual Therapy (timed):  decrease pain, increase ROM, increase tissue extensibility, and decrease trigger points to improve patient's ability to progress to PLOF and address remaining functional goals. The manual therapy interventions were performed at a separate and distinct time from the therapeutic activities interventions . (see flow sheet as applicable)     Details if applicable:  STM/DTM/TPR Teres minor/Post Delt/Ant Delt   8  [id number] Neuromuscular Re-Education (timed):  improve balance, coordination, kinesthetic sense, posture, core stability and proprioception to improve patient's ability to develop conscious control of individual muscles and awareness of position of extremities in order to progress to PLOF and address remaining functional goals. (see flow sheet as applicable)               63  [facility] Totals Reminder: bill using total billable min of TIMED therapeutic procedures (example: do not include dry needle or estim unattended, both untimed codes, in totals to left)  8-22 min = 1 unit; 23-37 min = 2 units; 38-52 min = 3 units; 53-67 min = 4 units; 68-82 min = 5 units   Total Total     [x]  Patient Education billed concurrently with other procedures   [x] Review HEP    [] Progressed/Changed HEP, detail:    [] Other detail:       Objective Information/Functional Measures/Assessment  - Less muscle tightness at the latissimus dorsi  - TTP at the left posterior/Lateral/and Anterior delt  - Continue to address scar tissue and TrPs  - - Treat with DTM/TPR followed by US      Improved tolerance with treatment session today.  Slight increased discomfort during resisted training and ROM with interventions but demonstrated good effort and participation with treatment program to aid with decreasing pain and increasing shoulder elevation and ER function. Patient continues to make slow progress toward goals      Patient will continue to benefit from skilled PT / OT services to modify and progress therapeutic interventions, analyze and address functional mobility deficits, analyze and address ROM deficits, analyze and address strength deficits, analyze and address soft tissue restrictions, and analyze and cue for proper movement patterns to address functional deficits and attain remaining goals. Progress toward goals / Updated goals:  []  See Progress Note/Recertification     Updated Goals: to be achieved in 10 treatments:  1..  Pt to tolerate 30 min or more of TE and/or Interventions w/o increased s/s             PN Status:  Pt tolerate 66 min of treatment with some discomfort during exercises              Current Status: Little tolerance to treatment today with increased pain 2/3/23  2. Pt will report 75% improvement or better with Left Shoulder dysfunction to show a significant increase in ability to tolerate ADL             PN Status: Pt reports that it is better at about 50% better              Current Status:    3. Pt will have decreased pain at 2/10 or better with activity for carryover to performing increased ADLs with less discomfort for an improved quality of life              PN Status:  Continued 5/10 pain decreased to 4/10 after tx               Current Status: Progressing with pain 3/10 today 2/24/23  4. Pt will demonstrate MMT left shoulder Flx/ABD at 4/10 for progress to performing more activity with less difficulty               PN Status: Progressing Flx 3-/5 and Abd 2+/5             Current Status: Flx 3-+ ABD 3- 2/13/23  5. Pt will demonstrate AROM left shoulder Flx/ABD at 110* or better for carryover to more function with overhead activity. PN Status:  AROM Left Shoulder Flx 62* ABD 55              Current Status: AROM left shoulder flx 73 ABD 52 2/13/23  6.   Pt will improve FOTO score to 67 in 15 visits to show significant improvement for progress to good shoulder function of the Left UE             PN Status: Decreased to 41              Current Status: FOTO increased to 43    PLAN  yes Continue plan of care  [x]  Upgrade activities as tolerated  []  Discharge due to :  []  Other:    Moe Alegre, PT    2/24/2023    10:38 AM    Future Appointments   Date Time Provider Lolis Solis   2/27/2023  8:00 AM Moe Files, PT [facility]   3/1/2023  9:00 AM Donna Lovell MD [facility] AMB PAST MEDICAL HISTORY:  Drug abuse     Migraine     No pertinent past medical history

## 2024-09-19 NOTE — PROGRESS NOTES
PT DAILY TREATMENT NOTE     Patient Name: Anthony Berger. Date:2022  : 1960  [x]  Patient  Verified  Payor: [facility] / Plan: [facility] [address] / Product Type: PPO /    In time:844  Out time:930  Total Treatment Time (min): 46  Visit #: 5 of 10      Treatment Area: Pain in left shoulder [M25.512]  Strain of muscle(s) and tendon(s) of the rotator cuff of left shoulder, subsequent encounter [S46.012D]    SUBJECTIVE  Pain Level (0-10 scale): 5/10  Any medication changes, allergies to medications, adverse drug reactions, diagnosis change, or new procedure performed?: [x] No    [] Yes (see summary sheet for update)  Subjective functional status/changes:   [] No changes reported  Reports /10 pain last night     OBJECTIVE      28 min Therapeutic Exercise:  [x] See flow sheet :   Rationale: increase ROM and increase strength to improve the patients ability to perform ADLs    10 min Therapeutic Activity:  [x]  See flow sheet :   Rationale: increase ROM, increase strength, and improve coordination  to improve the patients ability to perform ADLs     8 min Manual Therapy:  STM to right UT, biceps, triceps; grade I inferior and posture GHJ mobilizations to left shoulder  KT 2I-Strip for spacing at the left Teres minor and anterior deltoid    The manual therapy interventions were performed at a separate and distinct time from the therapeutic activities interventions.   Rationale: decrease pain, increase ROM, increase tissue extensibility, and decrease trigger points to perform ADLs       With   [x] TE   [] TA   [] neuro   [] other: Patient Education: [x] Review HEP    [] Progressed/Changed HEP based on:   [] positioning   [] body mechanics   [] transfers   [] heat/ice application    [] other:      Other Objective/Functional Measures:   FOTO = 41    Added bilateral ER with OTB 10x3\"   Ball roll up on the wall with orange Sball 10x   Initiated cone stack 12x on shelf that is shoulder height Provided stabilization at the elbow with ER with wand    Pain Level (0-10 scale) post treatment: 4/10    ASSESSMENT/Changes in Function: Patient actively participates in therapy and tolerates progressions well with no c/o increased pain. However, is still limited in mobility. Patient will continue to benefit from skilled PT services to modify and progress therapeutic interventions, address functional mobility deficits, address ROM deficits, address strength deficits, analyze and address soft tissue restrictions, analyze and cue movement patterns, analyze and modify body mechanics/ergonomics, and assess and modify postural abnormalities to attain remaining goals. []  See Plan of Care  []  See progress note/recertification  []  See Discharge Summary         Progress towards goals / Updated goals:  Short Term Goals: To be accomplished in 5 treatments:  1. Pt will be compliant and independent with HEP in order to facilitate PT sessions and aid with self management             Eval Status:  Initiated             Current Status: Pt reports compliance with HEP 11/28/22  2. Pt to tolerate 30 min or more of TE and/or Interventions w/o increased s/s             Eval Status:  Initiated             Current Status: Pt tolerate 66 min of treatment with some discomfort during exercises 11/28/22     Long Term Goals: To be accomplished in 10 treatments:  1. Pt will report 50% improvement or better with Left Shoulder dysfunction to show a significant increase in ability to tolerate ADL             Eval Status:  Initiated             Current Status:  2. Pt will have decreased pain at 2/10 or better with activity for carryover to performing increased ADLs with less discomfort for an improved quality of life              Eval Status:  Pain 4/10 at rest             Current Status: Continued 5/10 pain decreased to 4/10 after tx 12/16/22   3.   Pt will demonstrate MMT left shoulder Flx/ABD at 4/10 for progress to performing more activity with less difficulty               Eval Status:  MMT Left shoulder Flx/ABD 2+             Current Status:  4. Pt will demonstrate AROM left shoulder Flx/ABD at 110* or better for carryover to more function with overhead activity. Eval Status:  AROM Left Shoulder Flx 85* ABD 62             Current Status:  5.   Pt will improve FOTO score to 67 in 15 visits to show significant improvement for progress to good shoulder function of the Left UE             Eval Status: FOTO = 53             Current Status: Decreased to 41 12/16/22     PLAN  [x]  Upgrade activities as tolerated     []  Continue plan of care  []  Update interventions per flow sheet       []  Discharge due to:_  []  Other:_      Kd Lion PTA 12/16/2022  8:55 AM    Future Appointments   Date Time Provider Billie Turcios   12/19/2022  8:45 AM Veronica Donovan, PT [facility]   12/23/2022  9:30 AM Veronica Donovan PT [facility] normal...

## 2024-11-24 DIAGNOSIS — I10 ESSENTIAL (PRIMARY) HYPERTENSION: ICD-10-CM

## 2024-11-25 RX ORDER — AMLODIPINE BESYLATE 5 MG/1
5 TABLET ORAL DAILY
Qty: 90 TABLET | Refills: 0 | Status: SHIPPED | OUTPATIENT
Start: 2024-11-25

## 2025-02-12 ENCOUNTER — OFFICE VISIT (OUTPATIENT)
Facility: CLINIC | Age: 65
End: 2025-02-12
Payer: COMMERCIAL

## 2025-02-12 VITALS
WEIGHT: 151 LBS | HEART RATE: 75 BPM | SYSTOLIC BLOOD PRESSURE: 138 MMHG | BODY MASS INDEX: 24.27 KG/M2 | OXYGEN SATURATION: 98 % | DIASTOLIC BLOOD PRESSURE: 78 MMHG | HEIGHT: 66 IN | TEMPERATURE: 98.6 F

## 2025-02-12 DIAGNOSIS — Z12.5 PROSTATE CANCER SCREENING: ICD-10-CM

## 2025-02-12 DIAGNOSIS — I10 ESSENTIAL (PRIMARY) HYPERTENSION: Primary | ICD-10-CM

## 2025-02-12 DIAGNOSIS — E55.9 VITAMIN D DEFICIENCY, UNSPECIFIED: ICD-10-CM

## 2025-02-12 PROCEDURE — 3078F DIAST BP <80 MM HG: CPT | Performed by: FAMILY MEDICINE

## 2025-02-12 PROCEDURE — 3075F SYST BP GE 130 - 139MM HG: CPT | Performed by: FAMILY MEDICINE

## 2025-02-12 PROCEDURE — 99214 OFFICE O/P EST MOD 30 MIN: CPT | Performed by: FAMILY MEDICINE

## 2025-02-12 SDOH — ECONOMIC STABILITY: FOOD INSECURITY: WITHIN THE PAST 12 MONTHS, YOU WORRIED THAT YOUR FOOD WOULD RUN OUT BEFORE YOU GOT MONEY TO BUY MORE.: OFTEN TRUE

## 2025-02-12 SDOH — ECONOMIC STABILITY: FOOD INSECURITY: WITHIN THE PAST 12 MONTHS, THE FOOD YOU BOUGHT JUST DIDN'T LAST AND YOU DIDN'T HAVE MONEY TO GET MORE.: OFTEN TRUE

## 2025-02-12 ASSESSMENT — ENCOUNTER SYMPTOMS
RESPIRATORY NEGATIVE: 1
NAUSEA: 0
COUGH: 0
VOMITING: 0

## 2025-02-12 ASSESSMENT — PATIENT HEALTH QUESTIONNAIRE - PHQ9
SUM OF ALL RESPONSES TO PHQ QUESTIONS 1-9: 0
SUM OF ALL RESPONSES TO PHQ9 QUESTIONS 1 & 2: 0
SUM OF ALL RESPONSES TO PHQ QUESTIONS 1-9: 0
SUM OF ALL RESPONSES TO PHQ QUESTIONS 1-9: 0
1. LITTLE INTEREST OR PLEASURE IN DOING THINGS: NOT AT ALL
SUM OF ALL RESPONSES TO PHQ QUESTIONS 1-9: 0
2. FEELING DOWN, DEPRESSED OR HOPELESS: NOT AT ALL

## 2025-02-12 NOTE — PROGRESS NOTES
Chief Complaint   Patient presents with    Hypertension    Vitamin D def     Patient here today to be seen for his routine follow up    Results     Patient had an x-ray done on his left shoulder at [facility] 3 weeks ago and states he would like to discuss this today.     Blurred Vision     Patient states he had a scope on his left eye about one year again and has blurred vision on and off         \"Have you been to the ER, urgent care clinic since your last visit?  Hospitalized since your last visit?\"    NO    “Have you seen or consulted any other health care providers outside our system since your last visit?”    NO             
Inderjit Meier  is a 64 y.o. male  presents for   Chief Complaint   Patient presents with    Hypertension    Vitamin D def     Patient here today to be seen for his routine follow up    Results     Patient had an x-ray done on his left shoulder at [facility] 3 weeks ago and states he would like to discuss this today.     Blurred Vision     Patient states he had a scope on his left eye about one year again and has blurred vision on and off        No Known Allergies    Current Outpatient Medications:     amLODIPine (NORVASC) 5 MG tablet, TAKE 1 TABLET BY MOUTH EVERY DAY, Disp: 90 tablet, Rfl: 0    vitamin D (ERGOCALCIFEROL) 1.25 MG (25981 UT) CAPS capsule, TAKE 1 CAPSULE BY MOUTH ONE TIME PER WEEK, Disp: 16 capsule, Rfl: 1   Patient Active Problem List   Diagnosis    Advanced directives, counseling/discussion    Pain of right hip joint    Chronic right shoulder pain    Flank pain    Calculus of ureter    Elevated blood pressure    Hypertension    Migraine variant    Nephrolithiasis     Past Medical History:   Diagnosis Date    Hypertension      Social History     Socioeconomic History    Marital status:      Spouse name: None    Number of children: None    Years of education: None    Highest education level: None   Tobacco Use    Smoking status: Never    Smokeless tobacco: Never   Substance and Sexual Activity    Alcohol use: Yes     Alcohol/week: 8.0 standard drinks of alcohol    Drug use: No     Social Determinants of Health     Financial Resource Strain: Medium Risk (3/1/2023)    Overall Financial Resource Strain (CARDIA)     Difficulty of Paying Living Expenses: Somewhat hard   Food Insecurity:   Recent Concern: Food Insecurity - Food Insecurity Present (3/1/2023)    Hunger Vital Sign     Worried About Running Out of Food in the Last Year: Sometimes true     Ran Out of Food in the Last Year: Sometimes true   Transportation Needs: Unknown (3/1/2023)    PRAPARE - Transportation     Lack of Transportation 
COUGH

## 2025-02-12 NOTE — PATIENT INSTRUCTIONS
[facility] Food Resources*  (Call [employer]/211 if need more resources.)    [facility] and the [facility]  What they offer: Assistance with finding a food pantry, soup kitchen or resource near you.  Website: https://[email or web address]/get-help/community-resources/  Provide instructions for assistance with SNAP ([place]) application on this site.     SNAP (formerly [employer] [place])  What they offer: SNAP is used like cash to buy eligible food items from authorized retailers.  Apply for benefits online: https://www.[employer].[place].gov/  Apply for benefits by telephone: 212.406.6082  Apply for benefits at local Department of      Meals on Wheels   What they offer: Meals on Wheels is a program that delivers meals to individuals age 60+ at home who are unable to purchase or prepare their own meals.   Website: https://[employer].org/how-we-help/meals-on-wheels/  Requirements can vary by program and areas served.   To apply:  Contact [facility]: [phone] (Mon -Fri 8:30 a.m. to 4:30 p.m.)  Coverage Area:  [facility], [facility] & [facility]  Website: [email or web address]/contact-us/  Contact [phone] (Mon - Fri 8:00am to 5:30pm)  Coverage Areas: [facility], [facility].    [facility]  What they offer:  [employer] provides comprehensive services to the disadvantaged/low-income community, homebound seniors and homeless in [place], [facility], and [facility].  Phone Number: [phone]  [address] [place] [address]  Services:  Food pantry (Monday, Wednesday, Friday), Soup kitchen, Senior Grocery Delivery Program, Food Bank, hygiene essentials, aid with completing SNAP applications.   Website:

## 2025-03-01 DIAGNOSIS — I10 ESSENTIAL (PRIMARY) HYPERTENSION: ICD-10-CM

## 2025-03-03 RX ORDER — AMLODIPINE BESYLATE 5 MG/1
5 TABLET ORAL DAILY
Qty: 30 TABLET | Refills: 5 | Status: SHIPPED | OUTPATIENT
Start: 2025-03-03

## 2025-03-03 NOTE — TELEPHONE ENCOUNTER
Last OV: 02/12/2025  Next OV: 08/12/2025  Last refill: last rx written in 11/2025 with no refills. Refills given to last until next appt 08/2025

## 2025-03-19 ENCOUNTER — TELEPHONE (OUTPATIENT)
Facility: CLINIC | Age: 65
End: 2025-03-19

## 2025-03-19 ENCOUNTER — HOSPITAL ENCOUNTER (OUTPATIENT)
Facility: HOSPITAL | Age: 65
Setting detail: SPECIMEN
Discharge: HOME | End: 2025-03-22
Payer: COMMERCIAL

## 2025-03-19 ENCOUNTER — CLINICAL SUPPORT (OUTPATIENT)
Facility: CLINIC | Age: 65
End: 2025-03-19
Payer: COMMERCIAL

## 2025-03-19 DIAGNOSIS — Z12.5 PROSTATE CANCER SCREENING: ICD-10-CM

## 2025-03-19 DIAGNOSIS — E55.9 VITAMIN D DEFICIENCY, UNSPECIFIED: ICD-10-CM

## 2025-03-19 DIAGNOSIS — Z12.11 COLON CANCER SCREENING: Primary | ICD-10-CM

## 2025-03-19 DIAGNOSIS — I10 ESSENTIAL (PRIMARY) HYPERTENSION: ICD-10-CM

## 2025-03-19 DIAGNOSIS — I10 HYPERTENSION, UNSPECIFIED TYPE: Primary | ICD-10-CM

## 2025-03-19 LAB
25(OH)D3 SERPL-MCNC: 114.7 NG/ML (ref 30–100)
CHOLEST SERPL-MCNC: 164 MG/DL
ERYTHROCYTE [DISTWIDTH] IN BLOOD BY AUTOMATED COUNT: 14.6 % (ref 11.6–14.5)
HCT VFR BLD AUTO: 47.6 % (ref 36–48)
HDLC SERPL-MCNC: 68 MG/DL (ref 40–60)
HDLC SERPL: 2.4 (ref 0–5)
HGB BLD-MCNC: 14 G/DL (ref 13–16)
LDLC SERPL CALC-MCNC: 76 MG/DL (ref 0–100)
LIPID PANEL: ABNORMAL
MCH RBC QN AUTO: 23.4 PG (ref 24–34)
MCHC RBC AUTO-ENTMCNC: 29.4 G/DL (ref 31–37)
MCV RBC AUTO: 79.6 FL (ref 78–100)
NRBC # BLD: 0 K/UL (ref 0–0.01)
NRBC BLD-RTO: 0 PER 100 WBC
PLATELET # BLD AUTO: 347 K/UL (ref 135–420)
PMV BLD AUTO: 10.7 FL (ref 9.2–11.8)
RBC # BLD AUTO: 5.98 M/UL (ref 4.35–5.65)
TRIGL SERPL-MCNC: 100 MG/DL
VLDLC SERPL CALC-MCNC: 20 MG/DL
WBC # BLD AUTO: 5.1 K/UL (ref 4.6–13.2)

## 2025-03-19 PROCEDURE — 84154 ASSAY OF PSA FREE: CPT

## 2025-03-19 PROCEDURE — 84153 ASSAY OF PSA TOTAL: CPT

## 2025-03-19 PROCEDURE — 36415 COLL VENOUS BLD VENIPUNCTURE: CPT | Performed by: FAMILY MEDICINE

## 2025-03-19 PROCEDURE — 36415 COLL VENOUS BLD VENIPUNCTURE: CPT

## 2025-03-19 PROCEDURE — 80061 LIPID PANEL: CPT

## 2025-03-19 PROCEDURE — 85027 COMPLETE CBC AUTOMATED: CPT

## 2025-03-19 PROCEDURE — 82306 VITAMIN D 25 HYDROXY: CPT

## 2025-03-19 NOTE — TELEPHONE ENCOUNTER
Patient here for lab draw and was asking about an order for colonoscopy. He would like to know if he can do a screening or does he need to be referred to GI for colonoscopy.   Please advise

## 2025-03-19 NOTE — PROGRESS NOTES
Patient here for lab draw name and  verified venipuncture performed on patients right arm was successful patient tolerated well.

## 2025-03-21 LAB
PSA FREE MFR SERPL: 19.3 %
PSA FREE SERPL-MCNC: 0.27 NG/ML
PSA SERPL-MCNC: 1.4 NG/ML (ref 0–4)

## 2025-04-01 ENCOUNTER — RESULTS FOLLOW-UP (OUTPATIENT)
Facility: CLINIC | Age: 65
End: 2025-04-01

## 2025-08-12 ENCOUNTER — OFFICE VISIT (OUTPATIENT)
Facility: CLINIC | Age: 65
End: 2025-08-12
Payer: COMMERCIAL

## 2025-08-12 VITALS
DIASTOLIC BLOOD PRESSURE: 80 MMHG | BODY MASS INDEX: 24.53 KG/M2 | SYSTOLIC BLOOD PRESSURE: 130 MMHG | HEART RATE: 65 BPM | WEIGHT: 152 LBS | OXYGEN SATURATION: 97 %

## 2025-08-12 DIAGNOSIS — E55.9 VITAMIN D DEFICIENCY, UNSPECIFIED: Primary | ICD-10-CM

## 2025-08-12 DIAGNOSIS — I10 HYPERTENSION, UNSPECIFIED TYPE: ICD-10-CM

## 2025-08-12 DIAGNOSIS — Z12.11 SCREENING FOR COLON CANCER: ICD-10-CM

## 2025-08-12 PROCEDURE — 99214 OFFICE O/P EST MOD 30 MIN: CPT | Performed by: FAMILY MEDICINE

## 2025-08-12 PROCEDURE — 3075F SYST BP GE 130 - 139MM HG: CPT | Performed by: FAMILY MEDICINE

## 2025-08-12 PROCEDURE — 3079F DIAST BP 80-89 MM HG: CPT | Performed by: FAMILY MEDICINE

## 2025-08-12 RX ORDER — CHOLECALCIFEROL (VITAMIN D3) 25 MCG
1000 TABLET ORAL DAILY
Qty: 30 TABLET | Refills: 5 | Status: SHIPPED | OUTPATIENT
Start: 2025-08-12

## 2025-08-12 SDOH — ECONOMIC STABILITY: FOOD INSECURITY: WITHIN THE PAST 12 MONTHS, THE FOOD YOU BOUGHT JUST DIDN'T LAST AND YOU DIDN'T HAVE MONEY TO GET MORE.: OFTEN TRUE

## 2025-08-12 SDOH — ECONOMIC STABILITY: FOOD INSECURITY: WITHIN THE PAST 12 MONTHS, YOU WORRIED THAT YOUR FOOD WOULD RUN OUT BEFORE YOU GOT MONEY TO BUY MORE.: OFTEN TRUE

## 2025-08-12 ASSESSMENT — PATIENT HEALTH QUESTIONNAIRE - PHQ9
1. LITTLE INTEREST OR PLEASURE IN DOING THINGS: NOT AT ALL
SUM OF ALL RESPONSES TO PHQ QUESTIONS 1-9: 0
SUM OF ALL RESPONSES TO PHQ QUESTIONS 1-9: 0
2. FEELING DOWN, DEPRESSED OR HOPELESS: NOT AT ALL
SUM OF ALL RESPONSES TO PHQ QUESTIONS 1-9: 0
SUM OF ALL RESPONSES TO PHQ QUESTIONS 1-9: 0

## 2025-08-12 ASSESSMENT — ENCOUNTER SYMPTOMS
NAUSEA: 0
VOMITING: 0
COUGH: 0
RESPIRATORY NEGATIVE: 1